# Patient Record
Sex: MALE | Race: WHITE | Employment: FULL TIME
[De-identification: names, ages, dates, MRNs, and addresses within clinical notes are randomized per-mention and may not be internally consistent; named-entity substitution may affect disease eponyms.]

---

## 2020-11-10 ENCOUNTER — CLINICAL DOCUMENTATION (OUTPATIENT)
Dept: CASE MANAGEMENT | Age: 65
End: 2020-11-10

## 2020-11-10 ENCOUNTER — OFFICE VISIT (OUTPATIENT)
Dept: ONCOLOGY | Age: 65
End: 2020-11-10
Payer: COMMERCIAL

## 2020-11-10 ENCOUNTER — HOSPITAL ENCOUNTER (OUTPATIENT)
Dept: INFUSION THERAPY | Age: 65
Discharge: HOME | End: 2020-11-10
Payer: COMMERCIAL

## 2020-11-10 VITALS
RESPIRATION RATE: 19 BRPM | BODY MASS INDEX: 22.1 KG/M2 | TEMPERATURE: 97.3 F | WEIGHT: 154.4 LBS | DIASTOLIC BLOOD PRESSURE: 72 MMHG | HEART RATE: 85 BPM | HEIGHT: 70 IN | OXYGEN SATURATION: 96 % | SYSTOLIC BLOOD PRESSURE: 135 MMHG

## 2020-11-10 PROBLEM — C78.7 PANCREATIC CANCER METASTASIZED TO LIVER (HCC): Status: ACTIVE | Noted: 2020-11-10

## 2020-11-10 PROBLEM — Z51.11 CHEMOTHERAPY MANAGEMENT, ENCOUNTER FOR: Status: ACTIVE | Noted: 2020-11-10

## 2020-11-10 PROBLEM — C25.9 PANCREATIC CANCER METASTASIZED TO LIVER (HCC): Status: ACTIVE | Noted: 2020-11-10

## 2020-11-10 PROCEDURE — 99205 OFFICE O/P NEW HI 60 MIN: CPT | Performed by: INTERNAL MEDICINE

## 2020-11-10 PROCEDURE — 99211 OFF/OP EST MAY X REQ PHY/QHP: CPT

## 2020-11-10 RX ORDER — MEPERIDINE HYDROCHLORIDE 50 MG/ML
12.5 INJECTION INTRAMUSCULAR; INTRAVENOUS; SUBCUTANEOUS ONCE
Status: CANCELLED | OUTPATIENT
Start: 2020-11-27

## 2020-11-10 RX ORDER — PACLITAXEL 100 MG/20ML
125 INJECTION, POWDER, LYOPHILIZED, FOR SUSPENSION INTRAVENOUS ONCE
Status: CANCELLED | OUTPATIENT
Start: 2020-11-20

## 2020-11-10 RX ORDER — PACLITAXEL 100 MG/20ML
125 INJECTION, POWDER, LYOPHILIZED, FOR SUSPENSION INTRAVENOUS ONCE
Status: CANCELLED | OUTPATIENT
Start: 2020-11-13

## 2020-11-10 RX ORDER — EPINEPHRINE 1 MG/ML
0.3 INJECTION, SOLUTION, CONCENTRATE INTRAVENOUS PRN
Status: CANCELLED | OUTPATIENT
Start: 2020-11-13

## 2020-11-10 RX ORDER — DIPHENHYDRAMINE HYDROCHLORIDE 50 MG/ML
50 INJECTION INTRAMUSCULAR; INTRAVENOUS ONCE
Status: CANCELLED | OUTPATIENT
Start: 2020-11-20

## 2020-11-10 RX ORDER — SODIUM CHLORIDE 0.9 % (FLUSH) 0.9 %
10 SYRINGE (ML) INJECTION PRN
Status: CANCELLED | OUTPATIENT
Start: 2020-11-27

## 2020-11-10 RX ORDER — DIPHENHYDRAMINE HYDROCHLORIDE 50 MG/ML
50 INJECTION INTRAMUSCULAR; INTRAVENOUS ONCE
Status: CANCELLED | OUTPATIENT
Start: 2020-11-27

## 2020-11-10 RX ORDER — MEPERIDINE HYDROCHLORIDE 50 MG/ML
12.5 INJECTION INTRAMUSCULAR; INTRAVENOUS; SUBCUTANEOUS ONCE
Status: CANCELLED | OUTPATIENT
Start: 2020-11-20

## 2020-11-10 RX ORDER — PACLITAXEL 100 MG/20ML
125 INJECTION, POWDER, LYOPHILIZED, FOR SUSPENSION INTRAVENOUS ONCE
Status: CANCELLED | OUTPATIENT
Start: 2020-11-27

## 2020-11-10 RX ORDER — DOCUSATE SODIUM 100 MG/1
100 CAPSULE, LIQUID FILLED ORAL 2 TIMES DAILY
COMMUNITY

## 2020-11-10 RX ORDER — SODIUM CHLORIDE 0.9 % (FLUSH) 0.9 %
10 SYRINGE (ML) INJECTION PRN
Status: CANCELLED | OUTPATIENT
Start: 2020-11-13

## 2020-11-10 RX ORDER — SODIUM CHLORIDE 9 MG/ML
20 INJECTION, SOLUTION INTRAVENOUS ONCE
Status: CANCELLED | OUTPATIENT
Start: 2020-11-13

## 2020-11-10 RX ORDER — MORPHINE SULFATE 15 MG/1
15 TABLET ORAL EVERY 4 HOURS PRN
Qty: 21 TABLET | Refills: 0 | Status: SHIPPED | OUTPATIENT
Start: 2020-11-10 | End: 2020-11-12 | Stop reason: SDUPTHER

## 2020-11-10 RX ORDER — METHYLPREDNISOLONE SODIUM SUCCINATE 125 MG/2ML
125 INJECTION, POWDER, LYOPHILIZED, FOR SOLUTION INTRAMUSCULAR; INTRAVENOUS ONCE
Status: CANCELLED | OUTPATIENT
Start: 2020-11-13

## 2020-11-10 RX ORDER — MEPERIDINE HYDROCHLORIDE 50 MG/ML
12.5 INJECTION INTRAMUSCULAR; INTRAVENOUS; SUBCUTANEOUS ONCE
Status: CANCELLED | OUTPATIENT
Start: 2020-11-13

## 2020-11-10 RX ORDER — SODIUM CHLORIDE 0.9 % (FLUSH) 0.9 %
10 SYRINGE (ML) INJECTION PRN
Status: CANCELLED | OUTPATIENT
Start: 2020-11-20

## 2020-11-10 RX ORDER — SODIUM CHLORIDE 9 MG/ML
INJECTION, SOLUTION INTRAVENOUS CONTINUOUS
Status: CANCELLED | OUTPATIENT
Start: 2020-11-13

## 2020-11-10 RX ORDER — SODIUM CHLORIDE 0.9 % (FLUSH) 0.9 %
5 SYRINGE (ML) INJECTION PRN
Status: CANCELLED | OUTPATIENT
Start: 2020-11-27

## 2020-11-10 RX ORDER — EPINEPHRINE 1 MG/ML
0.3 INJECTION, SOLUTION, CONCENTRATE INTRAVENOUS PRN
Status: CANCELLED | OUTPATIENT
Start: 2020-11-27

## 2020-11-10 RX ORDER — SODIUM CHLORIDE 0.9 % (FLUSH) 0.9 %
5 SYRINGE (ML) INJECTION PRN
Status: CANCELLED | OUTPATIENT
Start: 2020-11-20

## 2020-11-10 RX ORDER — SODIUM CHLORIDE 9 MG/ML
20 INJECTION, SOLUTION INTRAVENOUS ONCE
Status: CANCELLED | OUTPATIENT
Start: 2020-11-20

## 2020-11-10 RX ORDER — HEPARIN SODIUM (PORCINE) LOCK FLUSH IV SOLN 100 UNIT/ML 100 UNIT/ML
500 SOLUTION INTRAVENOUS PRN
Status: CANCELLED | OUTPATIENT
Start: 2020-11-27

## 2020-11-10 RX ORDER — GABAPENTIN 400 MG/1
400 CAPSULE ORAL 3 TIMES DAILY
COMMUNITY

## 2020-11-10 RX ORDER — SODIUM CHLORIDE 9 MG/ML
INJECTION, SOLUTION INTRAVENOUS CONTINUOUS
Status: CANCELLED | OUTPATIENT
Start: 2020-11-27

## 2020-11-10 RX ORDER — SODIUM CHLORIDE 9 MG/ML
20 INJECTION, SOLUTION INTRAVENOUS ONCE
Status: CANCELLED | OUTPATIENT
Start: 2020-11-27

## 2020-11-10 RX ORDER — DIPHENHYDRAMINE HYDROCHLORIDE 50 MG/ML
50 INJECTION INTRAMUSCULAR; INTRAVENOUS ONCE
Status: CANCELLED | OUTPATIENT
Start: 2020-11-13

## 2020-11-10 RX ORDER — IBUPROFEN 200 MG
600 TABLET ORAL 4 TIMES DAILY PRN
COMMUNITY
End: 2020-12-02 | Stop reason: ALTCHOICE

## 2020-11-10 RX ORDER — METHYLPREDNISOLONE SODIUM SUCCINATE 125 MG/2ML
125 INJECTION, POWDER, LYOPHILIZED, FOR SOLUTION INTRAMUSCULAR; INTRAVENOUS ONCE
Status: CANCELLED | OUTPATIENT
Start: 2020-11-27

## 2020-11-10 RX ORDER — HEPARIN SODIUM (PORCINE) LOCK FLUSH IV SOLN 100 UNIT/ML 100 UNIT/ML
500 SOLUTION INTRAVENOUS PRN
Status: CANCELLED | OUTPATIENT
Start: 2020-11-13

## 2020-11-10 RX ORDER — ONDANSETRON 4 MG/1
4 TABLET, FILM COATED ORAL DAILY
COMMUNITY
End: 2020-11-11 | Stop reason: SDUPTHER

## 2020-11-10 RX ORDER — EPINEPHRINE 1 MG/ML
0.3 INJECTION, SOLUTION, CONCENTRATE INTRAVENOUS PRN
Status: CANCELLED | OUTPATIENT
Start: 2020-11-20

## 2020-11-10 RX ORDER — SODIUM CHLORIDE 0.9 % (FLUSH) 0.9 %
5 SYRINGE (ML) INJECTION PRN
Status: CANCELLED | OUTPATIENT
Start: 2020-11-13

## 2020-11-10 RX ORDER — SODIUM CHLORIDE 9 MG/ML
INJECTION, SOLUTION INTRAVENOUS CONTINUOUS
Status: CANCELLED | OUTPATIENT
Start: 2020-11-20

## 2020-11-10 RX ORDER — MORPHINE SULFATE 15 MG/1
15 TABLET ORAL EVERY 8 HOURS
COMMUNITY
End: 2020-11-13

## 2020-11-10 RX ORDER — POLYETHYLENE GLYCOL 3350 17 G/17G
17 POWDER, FOR SOLUTION ORAL DAILY
COMMUNITY

## 2020-11-10 RX ORDER — TAMSULOSIN HYDROCHLORIDE 0.4 MG/1
0.4 CAPSULE ORAL DAILY
Qty: 90 CAPSULE | Refills: 1 | Status: SHIPPED | OUTPATIENT
Start: 2020-11-10

## 2020-11-10 RX ORDER — METHYLPREDNISOLONE SODIUM SUCCINATE 125 MG/2ML
125 INJECTION, POWDER, LYOPHILIZED, FOR SOLUTION INTRAMUSCULAR; INTRAVENOUS ONCE
Status: CANCELLED | OUTPATIENT
Start: 2020-11-20

## 2020-11-10 RX ORDER — HEPARIN SODIUM (PORCINE) LOCK FLUSH IV SOLN 100 UNIT/ML 100 UNIT/ML
500 SOLUTION INTRAVENOUS PRN
Status: CANCELLED | OUTPATIENT
Start: 2020-11-20

## 2020-11-10 RX ORDER — DEXAMETHASONE SODIUM PHOSPHATE 4 MG/ML
8 INJECTION, SOLUTION INTRA-ARTICULAR; INTRALESIONAL; INTRAMUSCULAR; INTRAVENOUS; SOFT TISSUE ONCE
Status: CANCELLED | OUTPATIENT
Start: 2020-11-13

## 2020-11-10 NOTE — PROGRESS NOTES
[facility] [address] [facility] [address], [address] [facility] [address]  Justin[address] Peterson9  Dept: [phone]  Dept Fax: [phone]: [phone]    Visit Date:11/10/2020     Eve Uribe is a 76 y.o. male who presents today for:   Chief Complaint   Patient presents with    New Patient     Pancreatic Cancer        HPI:   This is a 68-year-old patient with newly diagnosed metastatic pancreatic cancer. Due to worsening abdominal/pelvic pain the patient had CT of the abdomen on October 15, 2020. It showed mass involving the neck, proximal body of the pancreas contagious with the lesser curvature of the stomach. Findings highly suspicious for malignancy involving the pancreas. CT of the abdomen also showed moderate amount of ascites, scattered nodular density in the peritoneum as well as the peritoneal service and several lesions in the liver along the liver capsule. , scattered nodular density in the peritoneum as well as the peritoneal service. CT-guided liver biopsy on October 27, 2020 showed poorly differentiated carcinoma. TTF-1 and CDX negative. Given the patient's history of prostate cancer prostate marker NKX3.1 was performed and it was negative. His CA 19-9 was elevated to 1402 on October 21, 2020. Based on clinical presentation, imaging studies, biopsy reports findings most consistent with primary pancreatic cancer with metastasis to the liver and peritoneum. Patient met with local oncologist at [facility] Dr. Koreen Sandifer who recommended palliated for chemotherapy with FOLFIRI NOX. For logistical reasons the patient would prefer to receive treatment in [facility]. Patient was placed by prior medical oncologist on MS Contin 15 mg twice daily and oxycodone IR for breakthrough pain. For nausea he was placed on Zofran as needed. Patient carries history of prostate cancer. Diagnosed in 2015. He completed course of definitive radiation therapy to the prostate   April/May 2016.   He received total  docusate sodium (COLACE) 100 MG capsule Take 100 mg by mouth 2 times daily      tamsulosin (FLOMAX) 0.4 MG capsule Take 1 capsule by mouth daily 90 capsule 1    morphine (MSIR) 15 MG tablet Take 1 tablet by mouth every 4 hours as needed for Pain for up to 7 days. 21 tablet 0    sodium chloride 1 g tablet Take 2 tablets by mouth 3 times daily (with meals) 90 tablet 0    Morphine Sulfate ER 30 MG T12A Take 30 mg by mouth every 8 hours for 30 days. 60 tablet 0    ondansetron (ZOFRAN) 4 MG tablet Take 1 tablet by mouth daily for 7 days Take 4 mg by mouth daily 21 tablet 0    naloxegol (MOVANTIK) 12.5 MG TABS tablet Take 1 tablet by mouth every morning 10 tablet 0    lidocaine 4 % external patch Place 3 patches onto the skin daily 30 patch 0    omeprazole (PRILOSEC) 20 MG delayed release capsule Take 1 capsule by mouth daily 30 capsule 3     No current facility-administered medications for this visit. Allergies   Allergen Reactions    Anaprox [Naproxen] Itching    Vicodin [Hydrocodone-Acetaminophen] Nausea Only      Health Maintenance   Topic Date Due    Hepatitis C screen  1955    DTaP/Tdap/Td vaccine (1 - Tdap) 01/31/1973    Lipid screen  01/31/1994    Shingles Vaccine (1 of 2) 01/31/2004    Colon cancer screen colonoscopy  01/31/2004    Pneumococcal 65+ yrs at Risk Vaccine (1 of 2 - PCV13) 01/31/2019    Flu vaccine (1) 09/01/2020    PSA counseling  10/21/2021    Low dose CT lung screening  10/22/2021    AAA screen  Completed    Hepatitis A vaccine  Aged Out    Hepatitis B vaccine  Aged Out    Hib vaccine  Aged Out    Meningococcal (ACWY) vaccine  Aged Out        Subjective:   Review of Systems   Constitutional: Positive for activity change, appetite change, fatigue and unexpected weight change. Negative for fever. HENT: Negative for congestion, dental problem, facial swelling, hearing loss, mouth sores, nosebleeds, sore throat, tinnitus and trouble swallowing.     Eyes: Negative for discharge and visual disturbance. Respiratory: Negative for cough, chest tightness, shortness of breath and wheezing. Cardiovascular: Negative for chest pain, palpitations and leg swelling. Gastrointestinal: Positive for abdominal distention, constipation and nausea. Negative for abdominal pain, blood in stool, diarrhea, rectal pain and vomiting. Endocrine: Negative for cold intolerance, polydipsia and polyuria. Genitourinary: Negative for decreased urine volume, difficulty urinating, dysuria, flank pain, hematuria and urgency. Musculoskeletal: Positive for arthralgias. Negative for back pain, gait problem, joint swelling, myalgias and neck stiffness. Skin: Negative for color change, rash and wound. Neurological: Positive for weakness. Negative for dizziness, tremors, seizures, speech difficulty, light-headedness, numbness and headaches. Hematological: Negative for adenopathy. Does not bruise/bleed easily. Psychiatric/Behavioral: Negative for confusion and sleep disturbance. The patient is not nervous/anxious. Objective:   Physical Exam  Vitals signs reviewed. Constitutional:       General: He is not in acute distress. Appearance: He is well-developed. HENT:      Head: Normocephalic. Mouth/Throat:      Pharynx: No oropharyngeal exudate. Eyes:      General: No scleral icterus. Right eye: No discharge. Left eye: No discharge. Pupils: Pupils are equal, round, and reactive to light. Neck:      Musculoskeletal: Normal range of motion and neck supple. Thyroid: No thyromegaly. Vascular: No JVD. Trachea: No tracheal deviation. Cardiovascular:      Rate and Rhythm: Normal rate. Heart sounds: Normal heart sounds. No murmur. No friction rub. No gallop. Pulmonary:      Effort: Pulmonary effort is normal. No respiratory distress. Breath sounds: Normal breath sounds. No stridor. No wheezing or rales.    Chest:      Chest wall: No tenderness. Abdominal:      General: Bowel sounds are normal. There is no distension. Palpations: Abdomen is soft. There is no mass. Tenderness: There is no abdominal tenderness. There is no rebound. Musculoskeletal: Normal range of motion. Comments: Good range of motion in all four extremities. Lymphadenopathy:      Cervical: No cervical adenopathy. Skin:     General: Skin is warm. Findings: No erythema or rash. Neurological:      Mental Status: He is alert and oriented to person, place, and time. Cranial Nerves: No cranial nerve deficit. Motor: No abnormal muscle tone. Deep Tendon Reflexes: Reflexes are normal and symmetric. Psychiatric:         Behavior: Behavior normal.         Thought Content: Thought content normal.         Judgment: Judgment normal.         /72 (Site: Right Upper Arm, Position: Sitting, Cuff Size: Medium Adult)   Pulse 85   Temp 97.3 °F (36.3 °C) (Temporal)   Resp 19   Ht 5' 10\" (1.778 m)   Wt 154 lb 6.4 oz (70 kg)   SpO2 96%   BMI 22.15 kg/m²      ECOG status is 2    Imaging studies and labs:   No results found. Lab Results   Component Value Date    WBC 15.5 (H) 11/26/2020    HGB 9.8 (L) 11/26/2020    HCT 29.7 (L) 11/26/2020    MCV 96.4 (H) 11/26/2020     (H) 11/26/2020         Assessment/Plan  1. Metastatic pancreatic cancer. I had a lengthy discussion was the patient and his friend about prognosis of metastatic pancreatic cancer. Metastatic pancreatic cancer is not curable, the goals of patient management are to prolong survival and to maintain the quality of life for as long as possible, while minimizing the side effects from treatment. Conventional chemotherapy is an option for patients with an adequate performances status, controlled comorbidity, and a sufficient support system.  Systemic chemotherapy provides benefit to patients with advanced pancreatic cancer, improving disease-related symptoms and survival when compared with best supportive care alone. Patients with pancreatic cancer who have a good ECOG performance status of 0 or 1, a favorable comorbidity profile, and a serum total bilirubin level that is <1.5 times the upper limit of normal and who are otherwise able to tolerate an intensive approach, FOLFIRINOX, rather than gemcitabine-based doublet represents an acceptable option. Gemcitabine plus Abraxane is potentially less toxic alternatives to FOLFIRINOX for patients with an adequate comorbidity profile and a serum bilirubin level that is <1.5 times ULN. For patients with an ECOG performance status of 2, favorable or adequate comorbidity, and a total serum bilirubin level that is <1.5 times ULN, monotherapy with gemcitabine is preferred choice. For patients with an ECOG performance status ? 3 or poorly controlled comorbid conditions supportive care should be emphasized  Patient's current performance ECOG is 2/3. My recommendation is to proceed with Abraxane and Gemzar. Side effects associated with these agents reviewed with the patient. They include:    GEMCITABINE:   · Low blood counts. · Drug fevers. · Muscle achiness / fatigue    ABRAXANE:  · Neuropathy  · Low blood counts. · Diarrhea. · Muscle achiness / fatigue    Both of these medications has a small potential to cause nausea. 2.  Poorly controlled cancer related pain. Referral is to pain management clinic. Diagnosis Orders   1. Pancreatic cancer metastasized to liver [facility])  Linda Smith MD, Pain Medicine, [address] [place] [address]    DISCONTINUED: 0.9 % sodium chloride infusion    DISCONTINUED: Dexamethasone Sodium Phosphate injection 8 mg    DISCONTINUED: gemcitabine HCl (GEMZAR) 1,860 mg in sodium chloride 0.9 % 250 mL chemo IVPB    DISCONTINUED: morphine (MSIR) 15 MG tablet   2.  Chemotherapy management, encounter for  Linda Smith MD, Pain Medicine, [address] [place] [address]    DISCONTINUED: 0.9 % sodium chloride infusion DISCONTINUED: Dexamethasone Sodium Phosphate injection 8 mg    DISCONTINUED: gemcitabine HCl (GEMZAR) 1,860 mg in sodium chloride 0.9 % 250 mL chemo IVPB    DISCONTINUED: morphine (MSIR) 15 MG tablet        Orders Placed:   Orders Placed This Encounter   Procedures   Christopher Arndt MD, Pain Medicine, [facility]     Referral Priority:   Routine     Referral Type:   Eval and Treat     Referral Reason:   Specialty Services Required     Referred to Provider:   Olena Alvarez MD     Requested Specialty:   Pain Medicine     Number of Visits Requested:   1        Medications Prescribed:   Orders Placed This Encounter   Medications    DISCONTD: morphine (MSIR) 15 MG tablet     Sig: Take 1 tablet by mouth every 4 hours as needed for Pain for up to 7 days. Dispense:  21 tablet     Refill:  0     Reduce doses taken as pain becomes manageable    tamsulosin (FLOMAX) 0.4 MG capsule     Sig: Take 1 capsule by mouth daily     Dispense:  90 capsule     Refill:  1             I spent 60 minutes face-to-face with the patient and his friend. Greater than 50% of time was spent on counseling and coordinating his care. Face to face counseling included prognosis, risks, benefits of treatment, compliance and risk reduction.        Electronically signed by Dagoberto Bustamante MD on 11/10/20 at 3:25 PM EST

## 2020-11-10 NOTE — PROGRESS NOTES
Name: Anika Funes  : 1955  MRN: [id number]    Oncology Navigation- Initial Note:    Intake-  Contact Type: Medical Oncology    Diagnosis: GI- malignant    Home Disposition: Lives with other who is able to assist    Patient needs and barriers to care: Emotional Issues/ Fear/ Anxiety-symptom management     Referral Source: Outpatient    Receptive to Advanced Care Planning/ Palliative Care:  Deferred at this time    Interventions-      Education/Screenings:  Navigation Welcome Packet given and program explained       Referrals: Pain management Clinic, Dietician, Financial Navigator       Continuum of Care: Diagnosis/Active Treatment    Notes: Met with Lucio Rajput and his friend, Alanna Belle, during consultation with Dr. Grisel Amos for his Stage IV pancreatic cancer. Met him walking to office from parking lot, and retrieved wheel chair for him. He is self referral-saw Med/Onc in [employer] in [place], but has moved in, along with wife, Miladys Malik, to friend Nelson's house. In severe pain \"10\"-from mid back to anus. Always constant-and occasional sharp, shooting pains. Due for pain med at 3pm, (now 3) but didn't know if he should take anything before he sees [facility]. He had Morphine ER 15mg and he took with water. He takes every 8 hrs with IBP inbetween-Morphine does not last 8 hrs. Dr. Grisel Amos wanted to admit to hospital to get pain under control, but no beds and ED has 71 patients. Morphine ER increased to 30mg am and 8hrs later, at night continue with 15. Morphine short acting prescribed for breakthrough pain. Referral to Pain Management Clinic, appointment . Dr. Grisel Amos spoke in detail regarding chemotherapy-to start with Abraxane and Gemzar and discussed the goal of treatment for Stage IV. Lucio Rajput understands no cure, but goal of treatment is to keep disease under control with quality of life with more time.   Currently, during the day he spends most of his time resting-can only take very short, limited walks before pain

## 2020-11-10 NOTE — PATIENT INSTRUCTIONS
1. STAT referral to pain management clinic  2. Will return to clinic on Friday, November 13, 2020 to see me for labs: CBC, BMP, LFTs, CA 19-9 and to start first line of palliative chemotherapy with Gemzar and Abraxane  3. Chemo teaching before RTC  Patient Education        gemcitabine  Pronunciation:  lanie DAVE sanchez jigar  Brand:  Gemzar, Infugem  What is the most important information I should know about gemcitabine? Gemcitabine can increase your risk of bleeding or infection. Call your doctor if you have unusual bruising or bleeding, or new signs of infection (fever, chills, tiredness, bruising or bleeding, pale skin). Gemcitabine can also affect your liver, kidneys, or lungs. Tell your doctor if you have stomach pain, dark urine, yellow skin or eyes, little or no urinating, swelling, rapid weight gain, severe shortness of breath, wheezing, or cough with foamy mucus. If you receive gemcitabine during or after radiation treatment, tell your doctor right away if you have severe skin redness, swelling, oozing, or peeling. What is gemcitabine? Gemcitabine is used to treat cancers of the pancreas, lung, ovary, and breast.  Gemcitabine is sometimes given with other cancer medicines, or when other cancer treatments did not work or have stopped working. Gemcitabine may also be used for purposes not listed in this medication guide. What should I discuss with my healthcare provider before receiving gemcitabine? You should not use gemcitabine if you are allergic to it. Tell your doctor if you have ever had:  · kidney disease;  · liver disease (especially cirrhosis);  · alcoholism; or  · radiation treatment. Both men and women using this medicine should use effective birth control to prevent pregnancy. Gemcitabine can harm an unborn baby if the mother or father is using this medicine. · If you are a woman, do not use gemcitabine if you are pregnant.  You may need to have a negative pregnancy test before starting this treatment. Use effective birth control to prevent pregnancy while you are using this medicine and for at least 6 months after your last dose. · If you are a man, use effective birth control if your sex partner is able to get pregnant. Keep using birth control for at least 3 months after your last dose. · Tell your doctor right away if a pregnancy occurs while either the mother or the father is using gemcitabine. This medicine may affect fertility (ability to have children) in men. However, it is important to use birth control to prevent pregnancy because gemcitabine can harm an unborn baby. You should not breastfeed while you are using gemcitabine, and for at least 1 week after your last dose. How is gemcitabine used? Gemcitabine is given as an infusion into a vein. A healthcare provider will give you this injection. Tell your caregivers if you feel any burning, pain, or swelling around the IV needle when gemcitabine is injected. If any of this medicine accidentally gets on your skin, wash the area thoroughly with soap and warm water. Gemcitabine can increase your risk of bleeding or infection. You will need frequent medical tests. What happens if I miss a dose? Call your doctor for instructions if you miss an appointment for your gemcitabine injection. What happens if I overdose? Seek emergency medical attention or call the Poison Help line at 1-712.913.1001. What should I avoid while using gemcitabine? Avoid being near people who are sick or have infections. Avoid activities that may increase your risk of bleeding or injury. Do not receive a \"live\" vaccine while using gemcitabine, and avoid coming into contact with anyone who has recently received a live vaccine. There is a chance that the virus could be passed on to you. Live vaccines include measles, mumps, rubella (MMR), polio, rotavirus, typhoid, yellow fever, varicella (chickenpox), zoster (shingles), and nasal flu (influenza) vaccine.   What are the possible side effects of gemcitabine? Get emergency medical help if you have signs of an allergic reaction: hives; difficult breathing; swelling of your face, lips, tongue, or throat. Also call your doctor at once if you have:  · headache, confusion, change in mental status, vision loss, seizure (convulsions);  · blisters or ulcers in your mouth, trouble eating or swallowing;  · severe skin redness, swelling, oozing, or peeling during or after radiation treatment;  · liver problems --loss of appetite, stomach pain (upper right side), itching, dark urine, silverio-colored stools, jaundice (yellowing of the skin or eyes);  · low blood cell counts --fever, chills, tiredness, skin sores, cold hands and feet, feeling light-headed;  · fluid build-up in or around the lungs --pain when you breathe, feeling short of breath while lying down, wheezing, gasping for breath, cough with foamy mucus, cold, clammy skin, anxiety, rapid heartbeats; or  · signs of damaged red blood cells --unusual bruising or bleeding, pale skin, bloody diarrhea, red or pink urine, swelling, rapid weight gain, and little or no urination. Your cancer treatments may be delayed or permanently discontinued if you have certain side effects. Common side effects may include:  · fever;  · nausea, vomiting;  · low blood cell counts;  · abnormal blood or urine tests;  · shortness of breath;  · swelling in your hands or feet;  · rash; or  · red or pink urine. This is not a complete list of side effects and others may occur. Call your doctor for medical advice about side effects. You may report side effects to FDA at 5-917-FDA-4457. What other drugs will affect gemcitabine? Other drugs may affect gemcitabine, including prescription and over-the-counter medicines, vitamins, and herbal products. Tell your doctor about all your current medicines and any medicine you start or stop using. Where can I get more information?   Your doctor or pharmacist can provide more information about gemcitabine. Remember, keep this and all other medicines out of the reach of children, never share your medicines with others, and use this medication only for the indication prescribed. Every effort has been made to ensure that the information provided by [address] is accurate, up-to-date, and complete, but no guarantee is made to that effect. Drug information contained herein may be time sensitive. [facility] information has been compiled for use by healthcare practitioners and consumers in the [place] and therefore [facility] does not warrant that uses outside of the [place] are appropriate, unless specifically indicated otherwise. [facility]'s drug information does not endorse drugs, diagnose patients or recommend therapy. [facility]'s drug information is an informational resource designed to assist licensed healthcare practitioners in caring for their patients and/or to serve consumers viewing this service as a supplement to, and not a substitute for, the expertise, skill, knowledge and judgment of healthcare practitioners. The absence of a warning for a given drug or drug combination in no way should be construed to indicate that the drug or drug combination is safe, effective or appropriate for any given patient. [facility] does not assume any responsibility for any aspect of healthcare administered with the aid of information [facility] provides. The information contained herein is not intended to cover all possible uses, directions, precautions, warnings, drug interactions, allergic reactions, or adverse effects. If you have questions about the drugs you are taking, check with your doctor, nurse or pharmacist.  Copyright 0101-0279 [facility] [employer]. Version: 9.02. Revision date: 3/24/2020. Care instructions adapted under license by [facility] ([facility]).  If you have questions about a medical condition or this instruction, always ask your healthcare professional. [employer] disclaims any warranty or liability for your use of this information.

## 2020-11-11 ASSESSMENT — ENCOUNTER SYMPTOMS
RECTAL PAIN: 0
COUGH: 0
SHORTNESS OF BREATH: 0
BACK PAIN: 0
DIARRHEA: 0
BLOOD IN STOOL: 0
WHEEZING: 0
TROUBLE SWALLOWING: 0
ABDOMINAL PAIN: 0
FACIAL SWELLING: 0
CHEST TIGHTNESS: 0
EYE DISCHARGE: 0
VOMITING: 0
SORE THROAT: 0
COLOR CHANGE: 0

## 2020-11-11 NOTE — PROGRESS NOTES
New chemotherapy validation note:    Diagnosis for chemotherapy: Pancreatic ca    Regimen ordered: Gemzar/Abraxane    Reference or literature used for validation: NCCN     Date literature or guideline last updated 2021 ? Deviation from literature or guideline used: None    Summary of any verbal or telephone information obtained: n/s     Delebrt SÁNCHEZ Ph.  11/11/2020  9:50 AM

## 2020-11-11 NOTE — PROGRESS NOTES
Miguelangel Torres is a 76 y.o. male presenting today for:   Chief Complaint   Patient presents with    New Patient     Pancreatic Cancer     History of Present Illness: This is a 76year old male with a previous history of prostate cancer now presenting with severe abdominal pain with metastatic pancreatic cancer. He initially started to experience bilateral abdominal pain a few months ago, thinking it was kidney stones. The pain then further spread to the pelvis and the testicles. He presented to his PCP on 10/15/2020 with the lower abdominal and pelvic pain. Dr. Sha Brito ordered CT abdomen pelvis on 10/21/2020 showing a necrotic mass involving the neck, proximal body of the pancreas. This mass seems to involve the stomach as well making this lesion concerning for malignancy. Liver biopsy on 10/27/2020 showed poorly differentiated carcinoma, solidifying the diagnosis of metastatic pancreatic cancer. History of Prostate Cancer August 2015  In August 2015 he had an elevated PSA of 15.07. 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 He was treated with neoadjuvant Lupron in December 2015 and completed radiotherapy to the prostate on 04/21/2016. His symptoms of urine obstruction got better over the course of the treatment. 11/10/2020 he presents to the oncology clinic for evaluation and treatment of his pancreatic cancer. He reports excruciating pain that is very limiting to his everyday life. He is only able to walk for short periods of time and to ambulate to the bathroom. Other than that he is mostly sedentary. He reports a shooting pain that is relatively constant everywhere from \"his shoulders to his anus. \" He is currently taking morphine 15 mg every 8 hours and using ibuprofen 600 mg for break through pain. His pain is not well managed on this regimen. He rates this pain 10/10 severity. ECOG score 3 as of 11/10/2020.  He is capable of only limited selfcare; confined to bed or chair more than 50% of waking hours. He admits to decrease in appetite and poor food intake. Additionally the patient is nauseous most everyday. Past Medical History:   Past Medical History:   Diagnosis Date    Anxiety     Arthritis     [facility] [address]     [facility]     Hyperlipidemia     [facility]     [facility] ([facility]     Sleep apnea        Past Surgical History:    Past Surgical History:   Procedure Laterality Date    APPENDECTOMY      BACK SURGERY      KNEE CARTILAGE SURGERY Right 1977    KNEE SURGERY Left     1980    NASAL SEPTUM SURGERY Left         Allergies: Allergies   Allergen Reactions    Anaprox [Naproxen] Itching    Vicodin [Hydrocodone-Acetaminophen] Nausea Only       Medications:    Morphine 15 mg tablet every 8 hours   Gabapentin 400 mg capsule 3x daily  Zofran 4 mg tablet daily  Ibuprofen 600 mg 4x daily PRN  Glycolax 17 g daily  Colace 100 mg capsule 2x daily   Flomax 0.4 mg capsule daily    Social History:    Social History     Tobacco Use    Smoking status: Current Every Day Smoker     Packs/day: 1.00     Years: 50.00     Pack years: 50.00     Types: Cigarettes     Start date: 1970    Smokeless tobacco: Never Used   Substance Use Topics    Alcohol use: Not Currently    Drug use: Not Currently       Family History:    Family History   Problem Relation Age of Onset    Cancer Mother     Depression Mother     Heart Disease Mother      Review of Systems:   General:  Positive fatigue, weight loss, decreased appetite. Denies fever, chills. Head:  Positive light headedness. Denies headache, dizziness. Eyes:  Denies changes in vision. Ears:  Denies hearing loss, tinnitus. Nose: Denies rhinorrhea, congestion, epistaxis. Throat: Denies sore throat, trouble swallowing. Neck: Denies neck tenderness, lymphadenopathy, decreased ROM. Back:   Positive back pain. History of lumbar spinal stenosis. Respiratory:   Denies SOB, cough. Cardiovascular:  Denies chest pain, palpitations, racing heart. GI:   Positive constipation. Last bowel movement 11/09/2020, he did not have bowel movement for over 5-6 days. Experienced increased abdominal pain during this time. Denies diarrhea, blood in stool. :  Denies urinary incontinence, dysuria, hematuria. Endocrine: Positive night sweats, hot flashes. Extremities: Denies edema. Skin: Denies skin changes, rashes. Neuro: Positive for nerve pain. History of fibromyalgia. Lymph nodes: Denies lymphadenopathy. Physical Exam:      General Appearance:  Alert and cooperative. Patient is ill-appearing and cachetic. He is thin in appearance. Head:  Normocephalic, without obvious abnormality, atraumatic. Eyes:  PERRL, EOM's intact, both eyes. Neck: Supple, symmetrical, trachea midline, no adenopathy,    Back:   Symmetric, no curvature, ROM limited. Lungs:   Clear to auscultation bilaterally, respirations unlabored. Heart:  Regular rate and rhythm, S1, S2 normal, no murmur, rub or gallop. Abdomen:   Soft, exquisite tenderness, guarding and pain with palpation and auscultation, bowel sounds active all four quadrants, no masses, no organomegaly. Extremities: Extremities normal, atraumatic, no cyanosis or edema. Pulses: 2+ and symmetric. Skin: Skin color, texture, turgor normal, no rashes or lesions. Lymph nodes: Cervical, supraclavicular, and axillary nodes normal.   Neurologic: Normal.         Assessment and Plan:   1. Metastatic Pancreatic Cancer   Patient presented to the clinic for management and treatment of newly diagnosed pancreatic cancer. Patient's associated pain is not well controlled which can be limiting to potential treatment. He is currently ECOG 3. Being that this cancer is very aggressive Dr. Rey Caraballo is planning to go ahead with treatment aside from the patient's pain.  At this time Dr. Rey Caraballo was prepared to admit the patient directly to the hospital for pain management, unfortunately the ED was overcrowded, making him unable to be admitted. He was given a STAT referral to the pain management clinic with Dr. Marie sOhea. In conjunction to the referral, the patient prescribed increased dose of long acting morphine and short acting morphine for breakthrough pain. Patient was instructed to take morphine 30 mg in the morning and midday, and 15 mg at nighttime. He was also given breakthrough short acting morphine 15 mg. Patient was instructed to return to clinic on Friday, 11/31/2020 for labs: CBC, BMP, LFTs, CA 19-9 and to start first line palliative chemotherapy with Gemzar and Abraxane. Chemo teaching is to be done before Friday's appointment. Bruno Molina was seen today for new patient. Diagnoses and all orders for this visit:    Pancreatic cancer metastasized to liver [facility])  -     Hammad Brambila MD, Pain Medicine, [address] [facility]  -     morphine (MSIR) 15 MG tablet; Take 1 tablet by mouth every 4 hours as needed for Pain for up to 7 days. Chemotherapy management, encounter for  -     Hammad Brambila MD, Pain Medicine, [address] [facility]  -     morphine (MSIR) 15 MG tablet; Take 1 tablet by mouth every 4 hours as needed for Pain for up to 7 days. Other orders  -     tamsulosin (FLOMAX) 0.4 MG capsule;  Take 1 capsule by mouth daily

## 2020-11-12 ENCOUNTER — HOSPITAL ENCOUNTER (OUTPATIENT)
Dept: INFUSION THERAPY | Age: 65
Discharge: HOME | End: 2020-11-12
Payer: COMMERCIAL

## 2020-11-12 PROCEDURE — 99212 OFFICE O/P EST SF 10 MIN: CPT

## 2020-11-12 RX ORDER — ONDANSETRON 4 MG/1
4 TABLET, FILM COATED ORAL DAILY
Qty: 21 TABLET | Refills: 0 | Status: ON HOLD | OUTPATIENT
Start: 2020-11-12 | End: 2020-11-23

## 2020-11-12 RX ORDER — SODIUM CHLORIDE 0.9 % (FLUSH) 0.9 %
10 SYRINGE (ML) INJECTION PRN
Status: CANCELLED | OUTPATIENT
Start: 2020-11-12

## 2020-11-12 RX ORDER — HEPARIN SODIUM (PORCINE) LOCK FLUSH IV SOLN 100 UNIT/ML 100 UNIT/ML
500 SOLUTION INTRAVENOUS PRN
Status: CANCELLED | OUTPATIENT
Start: 2020-11-12

## 2020-11-12 RX ORDER — MORPHINE SULFATE 15 MG/1
15 TABLET ORAL EVERY 4 HOURS PRN
Qty: 21 TABLET | Refills: 0 | Status: ON HOLD | OUTPATIENT
Start: 2020-11-12 | End: 2020-11-23

## 2020-11-12 RX ORDER — SODIUM CHLORIDE 0.9 % (FLUSH) 0.9 %
20 SYRINGE (ML) INJECTION PRN
Status: CANCELLED | OUTPATIENT
Start: 2020-11-12

## 2020-11-12 NOTE — PROGRESS NOTES
Patient and family instructed on chemotherapy specifically the drugs Abraxane/Gemzar and  chemotherapy regimen. The [employer] folder along with the following - chemotherapy drug information sheets,chemotherapy side effects handouts,Understanding your blood counts, [place] diet,Importance to hydration,when to call physician sheet,reduce risk infection sheet,bleeding precaution,neutropenia precaution, All questions answered satisfactory and support given. Patient navigator explained to patient and informed that she may be calling him/her if needs assistance. Patient given a tour of facility. Approximately 60 minutes spent with patient and family. Patient wheelchair assisted off the unit, by brother, with no further questions regarding his chemotherapy teaching, with belongings.

## 2020-11-12 NOTE — PLAN OF CARE
Problem: Musculor/Skeletal Functional Status  Goal: Absence of falls  Outcome: Met This Shift  Note: Patient free from falls while in O.P. Oncology. Intervention: Fall precautions  Note: Patient assessed for fall risk on admission to [address] [facility] [facility]. Fall band placed on patient. Discussed the need to use the call light for assistance prior to getting up out of chair/bed. Problem: Intellectual/Education/Knowledge Deficit  Goal: Teaching initiated upon admission  Outcome: Met This Shift  Note: Patient verbalizes understanding to verbal information given on Abraxane /Gemzar ,action and possible side effects. Aware to call MD if develop complications. Intervention: Verbal/written education provided  Note: Patient and family instructed on chemotherapy specifically the drugs Abraxane/Gemzar and  chemotherapy regimen. The [employer] folder along with the following - chemotherapy drug information sheets,chemotherapy side effects handouts,Understanding your blood counts, [place] diet,Importance to hydration,when to call physician sheet,reduce risk infection sheet,bleeding precaution,neutropenia precaution, All questions answered satisfactory and support given. Patient navigator explained to patient and informed that she may be calling him/her if needs assistance. Patient given a tour of facility. Approximately 60 minutes spent with patient and family. Problem: Discharge Planning:  Goal: Discharged to appropriate level of care  Description: Discharged to appropriate level of care  Outcome: Met This Shift  Note: Verbalized understanding of discharge instructions, follow-up appointments, and when to call the physician. Intervention: Assess readiness for discharge  Description: Assess readiness for discharge  Note: Discuss understanding of discharge instructions,follow-up appointments, and when to call the physician. Care plan reviewed with patient and brother.   Patient and brother verbalize understanding of the plan of care and contribute to goal setting.

## 2020-11-13 ENCOUNTER — HOSPITAL ENCOUNTER (OUTPATIENT)
Dept: INFUSION THERAPY | Age: 65
Discharge: HOME | End: 2020-11-13
Payer: COMMERCIAL

## 2020-11-13 ENCOUNTER — OFFICE VISIT (OUTPATIENT)
Dept: ONCOLOGY | Age: 65
End: 2020-11-13
Payer: COMMERCIAL

## 2020-11-13 VITALS
HEART RATE: 102 BPM | OXYGEN SATURATION: 97 % | BODY MASS INDEX: 22.79 KG/M2 | SYSTOLIC BLOOD PRESSURE: 167 MMHG | RESPIRATION RATE: 19 BRPM | DIASTOLIC BLOOD PRESSURE: 70 MMHG | HEIGHT: 70 IN | TEMPERATURE: 97.1 F | WEIGHT: 159.2 LBS

## 2020-11-13 VITALS
RESPIRATION RATE: 18 BRPM | DIASTOLIC BLOOD PRESSURE: 78 MMHG | HEART RATE: 78 BPM | TEMPERATURE: 95 F | SYSTOLIC BLOOD PRESSURE: 148 MMHG | OXYGEN SATURATION: 96 %

## 2020-11-13 DIAGNOSIS — C78.7 PANCREATIC CANCER METASTASIZED TO LIVER (HCC): Primary | ICD-10-CM

## 2020-11-13 DIAGNOSIS — C25.9 PANCREATIC CANCER METASTASIZED TO LIVER (HCC): Primary | ICD-10-CM

## 2020-11-13 DIAGNOSIS — Z51.11 CHEMOTHERAPY MANAGEMENT, ENCOUNTER FOR: ICD-10-CM

## 2020-11-13 LAB
ABSOLUTE IMMATURE GRANULOCYTE: 0.04 THOU/MM3 (ref 0–0.07)
ALBUMIN SERPL-MCNC: 3.6 G/DL (ref 3.5–5.1)
ALP BLD-CCNC: 597 U/L (ref 38–126)
ALT SERPL-CCNC: 127 U/L (ref 11–66)
AST SERPL-CCNC: 60 U/L (ref 5–40)
BASINOPHIL, AUTOMATED: 1 % (ref 0–3)
BASOPHILS ABSOLUTE: 0.1 THOU/MM3 (ref 0–0.1)
BILIRUB SERPL-MCNC: 2 MG/DL (ref 0.3–1.2)
BILIRUBIN DIRECT: 1.5 MG/DL (ref 0–0.3)
BUN, WHOLE BLOOD: 15 MG/DL (ref 8–26)
CA 19-9: 3368 U/ML (ref 0–35)
CHLORIDE, WHOLE BLOOD: 92 MEQ/L (ref 98–109)
CREATININE, WHOLE BLOOD: 0.6 MG/DL (ref 0.5–1.2)
EOSINOPHILS ABSOLUTE: 1.7 THOU/MM3 (ref 0–0.4)
EOSINOPHILS RELATIVE PERCENT: 16 % (ref 0–4)
GFR, ESTIMATED: > 90 ML/MIN/1.73M2
GLUCOSE, WHOLE BLOOD: 144 MG/DL (ref 70–108)
HCT VFR BLD CALC: 37.3 % (ref 42–52)
HEMOGLOBIN: 12.5 GM/DL (ref 14–18)
IMMATURE GRANULOCYTES: 0 %
IONIZED CALCIUM, WHOLE BLOOD: 1.13 MMOL/L (ref 1.12–1.32)
LYMPHOCYTES # BLD: 9 % (ref 15–47)
LYMPHOCYTES ABSOLUTE: 0.9 THOU/MM3 (ref 1–4.8)
MCH RBC QN AUTO: 31.5 PG (ref 26–33)
MCHC RBC AUTO-ENTMCNC: 33.5 GM/DL (ref 32.2–35.5)
MCV RBC AUTO: 94 FL (ref 80–94)
MONOCYTES ABSOLUTE: 0.9 THOU/MM3 (ref 0.4–1.3)
MONOCYTES: 9 % (ref 0–12)
PDW BLD-RTO: 11.9 % (ref 11.5–14.5)
PLATELET # BLD: 376 THOU/MM3 (ref 130–400)
PMV BLD AUTO: 9.3 FL (ref 9.4–12.4)
POTASSIUM, WHOLE BLOOD: 4.3 MEQ/L (ref 3.5–4.9)
RBC # BLD: 3.97 MILL/MM3 (ref 4.7–6.1)
SEG NEUTROPHILS: 65 % (ref 43–75)
SEGMENTED NEUTROPHILS ABSOLUTE COUNT: 6.8 THOU/MM3 (ref 1.8–7.7)
SODIUM, WHOLE BLOOD: 131 MEQ/L (ref 138–146)
TOTAL CO2, WHOLE BLOOD: 30 MEQ/L (ref 23–33)
TOTAL PROTEIN: 6.2 G/DL (ref 6.1–8)
WBC # BLD: 10.4 THOU/MM3 (ref 4.8–10.8)

## 2020-11-13 PROCEDURE — 99212 OFFICE O/P EST SF 10 MIN: CPT

## 2020-11-13 PROCEDURE — 80047 BASIC METABLC PNL IONIZED CA: CPT

## 2020-11-13 PROCEDURE — 96375 TX/PRO/DX INJ NEW DRUG ADDON: CPT

## 2020-11-13 PROCEDURE — 2580000003 HC RX 258: Performed by: INTERNAL MEDICINE

## 2020-11-13 PROCEDURE — 99215 OFFICE O/P EST HI 40 MIN: CPT | Performed by: INTERNAL MEDICINE

## 2020-11-13 PROCEDURE — 80076 HEPATIC FUNCTION PANEL: CPT

## 2020-11-13 PROCEDURE — 86301 IMMUNOASSAY TUMOR CA 19-9: CPT

## 2020-11-13 PROCEDURE — 96376 TX/PRO/DX INJ SAME DRUG ADON: CPT

## 2020-11-13 PROCEDURE — 6360000002 HC RX W HCPCS: Performed by: INTERNAL MEDICINE

## 2020-11-13 PROCEDURE — 96417 CHEMO IV INFUS EACH ADDL SEQ: CPT

## 2020-11-13 PROCEDURE — 96413 CHEMO IV INFUSION 1 HR: CPT

## 2020-11-13 PROCEDURE — 36415 COLL VENOUS BLD VENIPUNCTURE: CPT

## 2020-11-13 PROCEDURE — 36591 DRAW BLOOD OFF VENOUS DEVICE: CPT

## 2020-11-13 PROCEDURE — 85025 COMPLETE CBC W/AUTO DIFF WBC: CPT

## 2020-11-13 RX ORDER — PACLITAXEL 100 MG/20ML
125 INJECTION, POWDER, LYOPHILIZED, FOR SUSPENSION INTRAVENOUS ONCE
Status: CANCELLED | OUTPATIENT
Start: 2020-11-20

## 2020-11-13 RX ORDER — MORPHINE SULFATE 4 MG/ML
2 INJECTION, SOLUTION INTRAMUSCULAR; INTRAVENOUS ONCE
Status: COMPLETED | OUTPATIENT
Start: 2020-11-13 | End: 2020-11-13

## 2020-11-13 RX ORDER — PACLITAXEL 100 MG/20ML
125 INJECTION, POWDER, LYOPHILIZED, FOR SUSPENSION INTRAVENOUS ONCE
Status: COMPLETED | OUTPATIENT
Start: 2020-11-13 | End: 2020-11-13

## 2020-11-13 RX ORDER — DEXAMETHASONE SODIUM PHOSPHATE 4 MG/ML
8 INJECTION, SOLUTION INTRA-ARTICULAR; INTRALESIONAL; INTRAMUSCULAR; INTRAVENOUS; SOFT TISSUE ONCE
Status: COMPLETED | OUTPATIENT
Start: 2020-11-13 | End: 2020-11-13

## 2020-11-13 RX ORDER — SODIUM CHLORIDE 9 MG/ML
20 INJECTION, SOLUTION INTRAVENOUS ONCE
Status: DISCONTINUED | OUTPATIENT
Start: 2020-11-13 | End: 2020-11-14 | Stop reason: HOSPADM

## 2020-11-13 RX ORDER — PACLITAXEL 100 MG/20ML
125 INJECTION, POWDER, LYOPHILIZED, FOR SUSPENSION INTRAVENOUS ONCE
Status: CANCELLED | OUTPATIENT
Start: 2020-11-13

## 2020-11-13 RX ORDER — DEXAMETHASONE 4 MG/1
8 TABLET ORAL
Qty: 6 TABLET | Refills: 3 | Status: SHIPPED | OUTPATIENT
Start: 2020-11-13 | End: 2020-11-16

## 2020-11-13 RX ORDER — MORPHINE SULFATE 4 MG/ML
1 INJECTION, SOLUTION INTRAMUSCULAR; INTRAVENOUS ONCE
Status: COMPLETED | OUTPATIENT
Start: 2020-11-13 | End: 2020-11-13

## 2020-11-13 RX ORDER — PACLITAXEL 100 MG/20ML
125 INJECTION, POWDER, LYOPHILIZED, FOR SUSPENSION INTRAVENOUS ONCE
Status: CANCELLED | OUTPATIENT
Start: 2020-11-27

## 2020-11-13 RX ORDER — MORPHINE SULFATE 30 MG/1
30 TABLET, FILM COATED, EXTENDED RELEASE ORAL 2 TIMES DAILY
Qty: 60 TABLET | Refills: 0 | Status: ON HOLD | OUTPATIENT
Start: 2020-11-13 | End: 2020-11-23 | Stop reason: HOSPADM

## 2020-11-13 RX ADMIN — MORPHINE SULFATE 1 MG: 4 INJECTION, SOLUTION INTRAMUSCULAR; INTRAVENOUS at 09:05

## 2020-11-13 RX ADMIN — GEMCITABINE 1860 MG: 38 INJECTION, SOLUTION INTRAVENOUS at 09:47

## 2020-11-13 RX ADMIN — MORPHINE SULFATE 2 MG: 4 INJECTION, SOLUTION INTRAMUSCULAR; INTRAVENOUS at 10:23

## 2020-11-13 RX ADMIN — DEXAMETHASONE SODIUM PHOSPHATE 8 MG: 4 INJECTION, SOLUTION INTRAMUSCULAR; INTRAVENOUS at 09:09

## 2020-11-13 RX ADMIN — PACLITAXEL 235 MG: 100 INJECTION, POWDER, LYOPHILIZED, FOR SUSPENSION INTRAVENOUS at 10:43

## 2020-11-13 RX ADMIN — SODIUM CHLORIDE 20 ML/HR: 9 INJECTION, SOLUTION INTRAVENOUS at 09:05

## 2020-11-13 ASSESSMENT — PAIN SCALES - GENERAL
PAINLEVEL_OUTOF10: 5
PAINLEVEL_OUTOF10: 8
PAINLEVEL_OUTOF10: 9

## 2020-11-13 ASSESSMENT — PAIN DESCRIPTION - LOCATION: LOCATION: BACK;ABDOMEN

## 2020-11-13 ASSESSMENT — PAIN DESCRIPTION - FREQUENCY: FREQUENCY: INTERMITTENT

## 2020-11-13 ASSESSMENT — PAIN DESCRIPTION - ONSET: ONSET: ON-GOING

## 2020-11-13 ASSESSMENT — PAIN DESCRIPTION - PAIN TYPE: TYPE: CHRONIC PAIN

## 2020-11-13 ASSESSMENT — PAIN DESCRIPTION - PROGRESSION: CLINICAL_PROGRESSION: GRADUALLY IMPROVING

## 2020-11-13 NOTE — PROGRESS NOTES
Patient assessed for the following post chemotherapy:    Dizziness   No  Lightheadedness  No      Acute nausea/vomiting No  Headache   No  Chest pain/pressure  No  Rash/itching   No  Shortness of breath  No    Patient kept for 20 minutes observation post infusion chemotherapy. Patient tolerated chemotherapy treatment with abraxane and gemzar  without any complications. Last vital signs:   BP (!) 148/78   Pulse 78   Temp 95 °F (35 °C) (Tympanic)   Resp 18   SpO2 96%       Patient instructed if experience any of the above symptoms following today's infusion,he is to notify MD immediately or go to the emergency department. Discharge instructions given to patient. Verbalizes understanding. Off unit via wheelchair accompanied by friend  with belongings.

## 2020-11-13 NOTE — PLAN OF CARE
Problem: Pain:  Goal: Pain level will decrease  Description: Pain level will decrease  11/13/2020 1639 by Rebeka Adkins RN  Outcome: Met This Shift  Note: Pain level did decrease while here   11/13/2020 1634 by Rebeka Adkins RN  Outcome: Met This Shift  Intervention: Opioid analgesia side-effects  Note: Patient and friend verbalized understanding of medication given today for pain  Intervention: Promote participation in pain management plan  Note: Patient has pain clinic referral in process     Problem: Musculor/Skeletal Functional Status  Goal: Absence of falls  Outcome: Met This Shift  Note: No falls this admission   Intervention: Fall precautions  Note: Patient aware of fall precautions for here and at home -call light in reach while here       Problem: Intellectual/Education/Knowledge Deficit  Goal: Teaching initiated upon admission  Outcome: Met This Shift  Note: Chemotherapy Teaching     What is Chemotherapy   Drug action [x]   Method of Administration [x]   Handouts given []     Side Effects  Nausea/vomiting [x]   Diarrhea [x]   Fatigue [x]   Signs / Symptoms of infection [x]   Neutropenia [x]   Thrombocytopenia [x]   Alopecia [x]   neuropathy [x]   [place] diet &  the importance of fluids [x]       Micellaneous  Importance of nutrition [x]   Importance of oral hygiene [x]   When to call the MD [x]   Monitoring labs [x]   Use of supportive services []     Explanation of Drug Regimen / Frequency  Abraxane and gemzar      Comments  Verbalized understanding to drug,action,side effects and when to call MD      Goal: Written Disposition Instruction form completed  Outcome: Met This Shift  Note: Discharge instructions given and reviewed with patient. All questions answered.  Patient verbalized understanding   Intervention: Verbal/written education provided  Note: Discharge instructions sheets      Problem: Discharge Planning  Goal: Knowledge of discharge instructions  Description: Knowledge of discharge instructions  Outcome: Met This Shift  Note: Patient and family friend able to teach back follow up appointments and when to call the doctor. Patient offers no questions at this time   Intervention: Interaction with patient/family and care team  Note: Patient and friends concerns addressed   Intervention: Discharge to appropriate level of care  Note: Discharge home   Care plan reviewed with patient and friend . Patient and friend verbalize understanding of the plan of care and contribute to goal setting.

## 2020-11-13 NOTE — PROGRESS NOTES
severity. ECOG score 3 as of 11/10/2020. He is capable of only limited selfcare; confined to bed or chair more than 50% of waking hours. He admits to decrease in appetite and poor food intake. Additionally the patient is nauseous most everyday. Interim  history on November 13, 2020:  Patient presents to the medical oncology clinic to start cycle #1 of palliative chemotherapy with Gemzar and Abraxane. The patient reports poorly controlled abdominal pain radiating to his back. The patient ran out of long-acting morphine 24 hours ago. Continues having poor appetite. Denies having diarrhea. No fevers. No signs or symptoms of infection. Past Medical History:   Past Medical History:   Diagnosis Date    Anxiety     Arthritis     [facility] [address] [facility]     [facility] ulcer ([facility] [facility]     Hyperlipidemia     Panc[facility]     Prostate cancer ([facility] [facility]     Sleep apnea        Past Surgical History:    Past Surgical History:   Procedure Laterality Date    APPENDECTOMY      BACK SURGERY      KNEE CARTILAGE SURGERY Right 1977    KNEE SURGERY Left     1980    NASAL SEPTUM SURGERY Left         Allergies:     Allergies   Allergen Reactions    Anaprox [Naproxen] Itching    Vicodin [Hydrocodone-Acetaminophen] Nausea Only       Medications:    Morphine 15 mg tablet every 8 hours   Gabapentin 400 mg capsule 3x daily  Zofran 4 mg tablet daily  Ibuprofen 600 mg 4x daily PRN  Glycolax 17 g daily  Colace 100 mg capsule 2x daily   Flomax 0.4 mg capsule daily    Social History:    Social History     Tobacco Use    Smoking status: Current Every Day Smoker     Packs/day: 1.00     Years: 50.00     Pack years: 50.00     Types: Cigarettes     Start date: 1970    Smokeless tobacco: Never Used   Substance Use Topics    Alcohol use: Not Currently    Drug use: Not Currently       Family History:    Family History   Problem Relation Age of Onset    Cancer Mother     Depression Mother     Heart Disease Mother      Review of Systems:   General:  Positive fatigue, weight loss, decreased appetite. Denies fever, chills. Head:  Positive light headedness. Denies headache, dizziness. Eyes:  Denies changes in vision. Ears:  Denies hearing loss, tinnitus. Nose: Denies rhinorrhea, congestion, epistaxis. Throat: Denies sore throat, trouble swallowing. Neck: Denies neck tenderness, lymphadenopathy, decreased ROM. Back:   Positive back pain. History of lumbar spinal stenosis. Respiratory:   Denies SOB, cough. Cardiovascular:  Denies chest pain, palpitations, racing heart. GI:   Positive constipation. Last bowel movement 11/09/2020, he did not have bowel movement for over 5-6 days. Experienced increased abdominal pain during this time. Denies diarrhea, blood in stool. :  Denies urinary incontinence, dysuria, hematuria. Endocrine: Positive night sweats, hot flashes. Extremities: Denies edema. Skin: Denies skin changes, rashes. Neuro: Positive for nerve pain. History of fibromyalgia. Lymph nodes: Denies lymphadenopathy. Physical Exam:      General Appearance:  Alert and cooperative. Patient is ill-appearing and cachetic. He is thin in appearance. Head:  Normocephalic, without obvious abnormality, atraumatic. Eyes:  PERRL, EOM's intact, both eyes. Neck: Supple, symmetrical, trachea midline, no adenopathy,    Back:   Symmetric, no curvature, ROM limited. Lungs:   Clear to auscultation bilaterally, respirations unlabored. Heart:  Regular rate and rhythm, S1, S2 normal, no murmur, rub or gallop. Abdomen:   Soft, exquisite tenderness, guarding and pain with palpation and auscultation, bowel sounds active all four quadrants, no masses, no organomegaly. Extremities: Extremities normal, atraumatic, no cyanosis or edema. Pulses: 2+ and symmetric. Skin: Skin color, texture, turgor normal, no rashes or lesions.    Lymph nodes: Cervical, supraclavicular, and axillary nodes normal.   Neurologic: Normal.         Assessment and Plan:   1. Metastatic Pancreatic Cancer   Patient presented to the clinic for management and treatment of newly diagnosed pancreatic cancer. I had a lengthy discussion was the patient and his wife about prognosis of metastatic pancreatic cancer. Metastatic pancreatic cancer is not curable, the goals of patient management are to prolong survival and to maintain the quality of life for as long as possible, while minimizing the side effects from treatment. Conventional chemotherapy is an option for patients with an adequate performances status, controlled comorbidity, and a sufficient support system. Systemic chemotherapy provides benefit to patients with advanced pancreatic cancer, improving disease-related symptoms and survival when compared with best supportive care alone. Patients with pancreatic cancer who have a good ECOG performance status of 0 or 1, a favorable comorbidity profile, and a serum total bilirubin level that is <1.5 times the upper limit of normal and who are otherwise able to tolerate an intensive approach, FOLFIRINOX, rather than gemcitabine-based doublet represents an acceptable option. Gemcitabine plus Abraxane is potentially less toxic alternatives to FOLFIRINOX for patients with an adequate comorbidity profile and a serum bilirubin level that is <1.5 times ULN. For patients with an ECOG performance status of 2, favorable or adequate comorbidity, and a total serum bilirubin level that is <1.5 times ULN, monotherapy with gemcitabine is preferred choice. For patients with an ECOG performance status ? 3 or poorly controlled comorbid conditions supportive care should be emphasized. My recommendation is to proceed with Abraxane and Gemzar  2. Palliative chemotherapy with Abraxane and Gemzar. Vital signs are stable, labs are within range allowing for treatment today. Side effects associated with these agents were reviewed with the patient again.   They include:    GEMCITABINE:   · Low blood counts. · Drug fevers. This can happen within 24 hours of receiving the drug and may last for 1 to 2 days. · Muscle achiness / fatigue    ABRAXANE:  · Neuropathy. · Low blood counts. · Diarrhea. Both of these medications has a small potential to cause nausea. We will give you an anti-nausea medication here prior to receiving these agents. You will also have something to take at home as needed for nausea. If you experience nausea that is not controlled by the medication you have, please call our office. 3.  Poorly controlled cancer related pain. The patient will receive 1 mg of IV morphine to help with acute pain. We wrote new prescription for MS Contin 30 mg every 8 hours. OARS prescription monitoring report for this patient was reviewed today. No signs of potential drug abuse or divergent fashion identified. Patient was instructed to take dexamethasone 8 mg daily for 3 days starting tomorrow     Sonia Princess was seen today for follow-up and treatment. Diagnoses and all orders for this visit:    Pancreatic cancer metastasized to liver [facility])  -     Discontinue: Morphine Sulfate ER 15 MG T12A; Take 15 mg by mouth every 12 hours for 30 days. (Patient not taking: Reported on 11/13/2020)  -     Morphine Sulfate ER 30 MG T12A; Take 30 mg by mouth every 12 hours for 30 days. -     morphine (MS CONTIN) 30 MG extended release tablet; Take 1 tablet by mouth 2 times daily for 30 days. Chemotherapy management, encounter for    Cancer related pain    Failure to thrive in adult    Other orders  -     dexamethasone (DECADRON) 4 MG tablet;  Take 2 tablets by mouth daily (with breakfast) for 3 doses

## 2020-11-13 NOTE — PROGRESS NOTES
Patient sleeping in chair quietely no moaning noted. Friend states this is the most comfortable I have seen him rest in a week.

## 2020-11-13 NOTE — PROGRESS NOTES
Chemotherapy Administration    Pre-assessment Data: Antineoplastic Agents  Other:   See toxicity flow sheet for assessment [x]     Physician Notification of Concerns Related to Chemotherapy Administration:   Physician Notified Rolando Ann / Time of Notification      Interventions:   Lab work assessed  [x]   Height / Weight verified for dose [x]   Current MAR reviewed [x]   Emergency drugs available as appropriate [x]   Anaphylaxis assessment completed [x]   Pre-medications administered as ordered [x]   Blood return noted upon initiation of chemotherapy [x]   Blood return noted each 1-2ml of a vesicant medication if given IV push []   Blood return noted each 2-3ml of a non-vesicant medication if given IV push []   Monitor for signs / symptoms of hypersensitivity reaction [x]   Chemotherapy orders (drug/dose/rate) verified by 2 Chemo certified RNs [x]   Monitor IV site and blood return throughout the infusion of the medication [x]   Document IV site checks on the IV assessment form [x]   Document chemotherapy teaching on the Patient Education tab [x]   Document patient verbalizes understanding of medications being administered [x]   If IV infiltration, see ONS Guidelines []   Other:      []

## 2020-11-16 ENCOUNTER — TELEPHONE (OUTPATIENT)
Dept: ONCOLOGY | Age: 65
End: 2020-11-16

## 2020-11-16 NOTE — TELEPHONE ENCOUNTER
Patient called stating he woke up this morning and his feet were a little swollen, instructed patient to elevate legs and see if swelling goes down. He says he has not had swelling before and doesn't know how concerned he should be about it. Also, states his GERD is acting up and would like something prescribed for it, states he cannot holding anything down without it coming up to burn his throat. Also, is asking if he can have a cream for his rectal area. States he has used in the past a Cordran 0.05% cream. Please advise. Thank you.

## 2020-11-18 ENCOUNTER — TELEPHONE (OUTPATIENT)
Dept: ONCOLOGY | Age: 65
End: 2020-11-18

## 2020-11-18 RX ORDER — FLURANDRENOLIDE 0.5 MG/G
1 CREAM TOPICAL 2 TIMES DAILY
Qty: 60 G | Refills: 1 | Status: SHIPPED | OUTPATIENT
Start: 2020-11-18 | End: 2020-11-25

## 2020-11-18 RX ORDER — OMEPRAZOLE 20 MG/1
20 CAPSULE, DELAYED RELEASE ORAL DAILY
Qty: 30 CAPSULE | Refills: 3 | Status: SHIPPED | OUTPATIENT
Start: 2020-11-18

## 2020-11-18 NOTE — TELEPHONE ENCOUNTER
Cordran cream required a prior authorization with insurance. It has been approved. Called [facility] to reprocess. Patient has $0 copay, but they have to order it for 11/19/20. Called patient and updated him. He voiced appreciation and understanding. Patient will call with any other issues.

## 2020-11-18 NOTE — TELEPHONE ENCOUNTER
Gregg Ferrer called and stated that at [place]`s appt on 11/13/20 that something for his GERD,which he is unable to keep water down his stomach hurts and talya rectal ointment. Please advise and thanks.

## 2020-11-19 ENCOUNTER — OFFICE VISIT (OUTPATIENT)
Dept: PHYSICAL MEDICINE AND REHAB | Age: 65
End: 2020-11-19
Payer: COMMERCIAL

## 2020-11-19 ENCOUNTER — HOSPITAL ENCOUNTER (INPATIENT)
Age: 65
LOS: 7 days | Discharge: HOME | DRG: 947 | End: 2020-11-26
Attending: FAMILY MEDICINE | Admitting: FAMILY MEDICINE
Payer: COMMERCIAL

## 2020-11-19 ENCOUNTER — APPOINTMENT (OUTPATIENT)
Dept: GENERAL RADIOLOGY | Age: 65
DRG: 947 | End: 2020-11-19
Attending: FAMILY MEDICINE
Payer: COMMERCIAL

## 2020-11-19 VITALS
DIASTOLIC BLOOD PRESSURE: 58 MMHG | HEIGHT: 70 IN | WEIGHT: 159 LBS | BODY MASS INDEX: 22.76 KG/M2 | SYSTOLIC BLOOD PRESSURE: 98 MMHG

## 2020-11-19 PROBLEM — C25.9 PANCREATIC CANCER (HCC): Status: ACTIVE | Noted: 2020-11-19

## 2020-11-19 LAB
ALBUMIN SERPL-MCNC: 2.8 G/DL (ref 3.5–5.1)
ALP BLD-CCNC: 482 U/L (ref 38–126)
ALT SERPL-CCNC: 201 U/L (ref 11–66)
ANION GAP SERPL CALCULATED.3IONS-SCNC: 11 MEQ/L (ref 8–16)
AST SERPL-CCNC: 65 U/L (ref 5–40)
BASOPHILS # BLD: 0.3 %
BASOPHILS ABSOLUTE: 0 THOU/MM3 (ref 0–0.1)
BILIRUB SERPL-MCNC: 5 MG/DL (ref 0.3–1.2)
BUN BLDV-MCNC: 28 MG/DL (ref 7–22)
CALCIUM SERPL-MCNC: 9 MG/DL (ref 8.5–10.5)
CHLORIDE BLD-SCNC: 86 MEQ/L (ref 98–111)
CO2: 28 MEQ/L (ref 23–33)
CREAT SERPL-MCNC: 0.6 MG/DL (ref 0.4–1.2)
EOSINOPHIL # BLD: 3.4 %
EOSINOPHILS ABSOLUTE: 0.4 THOU/MM3 (ref 0–0.4)
ERYTHROCYTE [DISTWIDTH] IN BLOOD BY AUTOMATED COUNT: 13 % (ref 11.5–14.5)
ERYTHROCYTE [DISTWIDTH] IN BLOOD BY AUTOMATED COUNT: 44.4 FL (ref 35–45)
GFR SERPL CREATININE-BSD FRML MDRD: > 90 ML/MIN/1.73M2
GLUCOSE BLD-MCNC: 123 MG/DL (ref 70–108)
HCT VFR BLD CALC: 31.5 % (ref 42–52)
HEMOGLOBIN: 11 GM/DL (ref 14–18)
IMMATURE GRANS (ABS): 0.16 THOU/MM3 (ref 0–0.07)
IMMATURE GRANULOCYTES: 1.4 %
LYMPHOCYTES # BLD: 4.8 %
LYMPHOCYTES ABSOLUTE: 0.6 THOU/MM3 (ref 1–4.8)
MAGNESIUM: 1.7 MG/DL (ref 1.6–2.4)
MCH RBC QN AUTO: 32.6 PG (ref 26–33)
MCHC RBC AUTO-ENTMCNC: 34.9 GM/DL (ref 32.2–35.5)
MCV RBC AUTO: 93.5 FL (ref 80–94)
MONOCYTES # BLD: 2.5 %
MONOCYTES ABSOLUTE: 0.3 THOU/MM3 (ref 0.4–1.3)
NUCLEATED RED BLOOD CELLS: 0 /100 WBC
OSMOLALITY: 272 MOSMOL/KG (ref 275–295)
PHOSPHORUS: 3.7 MG/DL (ref 2.4–4.7)
PLATELET # BLD: 85 THOU/MM3 (ref 130–400)
PMV BLD AUTO: 11.8 FL (ref 9.4–12.4)
POTASSIUM SERPL-SCNC: 4.6 MEQ/L (ref 3.5–5.2)
PRO-BNP: 362 PG/ML (ref 0–900)
RBC # BLD: 3.37 MILL/MM3 (ref 4.7–6.1)
SEG NEUTROPHILS: 87.6 %
SEGMENTED NEUTROPHILS ABSOLUTE COUNT: 10.2 THOU/MM3 (ref 1.8–7.7)
SODIUM BLD-SCNC: 125 MEQ/L (ref 135–145)
TOTAL PROTEIN: 5.1 G/DL (ref 6.1–8)
WBC # BLD: 11.6 THOU/MM3 (ref 4.8–10.8)

## 2020-11-19 PROCEDURE — 84100 ASSAY OF PHOSPHORUS: CPT

## 2020-11-19 PROCEDURE — 83930 ASSAY OF BLOOD OSMOLALITY: CPT

## 2020-11-19 PROCEDURE — 83735 ASSAY OF MAGNESIUM: CPT

## 2020-11-19 PROCEDURE — 83880 ASSAY OF NATRIURETIC PEPTIDE: CPT

## 2020-11-19 PROCEDURE — 99205 OFFICE O/P NEW HI 60 MIN: CPT | Performed by: ANESTHESIOLOGY

## 2020-11-19 PROCEDURE — 74018 RADEX ABDOMEN 1 VIEW: CPT

## 2020-11-19 PROCEDURE — 1200000000 HC SEMI PRIVATE

## 2020-11-19 PROCEDURE — 80053 COMPREHEN METABOLIC PANEL: CPT

## 2020-11-19 PROCEDURE — 6370000000 HC RX 637 (ALT 250 FOR IP): Performed by: NURSE PRACTITIONER

## 2020-11-19 PROCEDURE — 2580000003 HC RX 258: Performed by: STUDENT IN AN ORGANIZED HEALTH CARE EDUCATION/TRAINING PROGRAM

## 2020-11-19 PROCEDURE — 6370000000 HC RX 637 (ALT 250 FOR IP): Performed by: STUDENT IN AN ORGANIZED HEALTH CARE EDUCATION/TRAINING PROGRAM

## 2020-11-19 PROCEDURE — 6360000002 HC RX W HCPCS: Performed by: NURSE PRACTITIONER

## 2020-11-19 PROCEDURE — 36415 COLL VENOUS BLD VENIPUNCTURE: CPT

## 2020-11-19 PROCEDURE — 99253 IP/OBS CNSLTJ NEW/EST LOW 45: CPT | Performed by: NURSE PRACTITIONER

## 2020-11-19 PROCEDURE — 99223 1ST HOSP IP/OBS HIGH 75: CPT | Performed by: FAMILY MEDICINE

## 2020-11-19 PROCEDURE — 85025 COMPLETE CBC W/AUTO DIFF WBC: CPT

## 2020-11-19 RX ORDER — GABAPENTIN 400 MG/1
400 CAPSULE ORAL 3 TIMES DAILY
Status: DISCONTINUED | OUTPATIENT
Start: 2020-11-19 | End: 2020-11-26 | Stop reason: HOSPADM

## 2020-11-19 RX ORDER — ACETAMINOPHEN 650 MG/1
650 SUPPOSITORY RECTAL EVERY 6 HOURS PRN
Status: DISCONTINUED | OUTPATIENT
Start: 2020-11-19 | End: 2020-11-19

## 2020-11-19 RX ORDER — SODIUM CHLORIDE 0.9 % (FLUSH) 0.9 %
10 SYRINGE (ML) INJECTION PRN
Status: DISCONTINUED | OUTPATIENT
Start: 2020-11-19 | End: 2020-11-26 | Stop reason: HOSPADM

## 2020-11-19 RX ORDER — PROMETHAZINE HYDROCHLORIDE 25 MG/1
12.5 TABLET ORAL EVERY 6 HOURS PRN
Status: DISCONTINUED | OUTPATIENT
Start: 2020-11-19 | End: 2020-11-26 | Stop reason: HOSPADM

## 2020-11-19 RX ORDER — SODIUM CHLORIDE 0.9 % (FLUSH) 0.9 %
10 SYRINGE (ML) INJECTION EVERY 12 HOURS SCHEDULED
Status: DISCONTINUED | OUTPATIENT
Start: 2020-11-19 | End: 2020-11-26 | Stop reason: HOSPADM

## 2020-11-19 RX ORDER — MORPHINE SULFATE 30 MG/1
30 TABLET, FILM COATED, EXTENDED RELEASE ORAL 2 TIMES DAILY
Status: DISCONTINUED | OUTPATIENT
Start: 2020-11-19 | End: 2020-11-19

## 2020-11-19 RX ORDER — ACETAMINOPHEN 325 MG/1
650 TABLET ORAL EVERY 4 HOURS PRN
Status: DISCONTINUED | OUTPATIENT
Start: 2020-11-19 | End: 2020-11-26 | Stop reason: HOSPADM

## 2020-11-19 RX ORDER — ACETAMINOPHEN 325 MG/1
650 TABLET ORAL EVERY 6 HOURS PRN
Status: DISCONTINUED | OUTPATIENT
Start: 2020-11-19 | End: 2020-11-19

## 2020-11-19 RX ORDER — MORPHINE SULFATE 30 MG/1
30 TABLET, FILM COATED, EXTENDED RELEASE ORAL EVERY 8 HOURS
Status: DISCONTINUED | OUTPATIENT
Start: 2020-11-19 | End: 2020-11-26 | Stop reason: HOSPADM

## 2020-11-19 RX ORDER — DOCUSATE SODIUM 100 MG/1
100 CAPSULE, LIQUID FILLED ORAL 2 TIMES DAILY
Status: DISCONTINUED | OUTPATIENT
Start: 2020-11-19 | End: 2020-11-26 | Stop reason: HOSPADM

## 2020-11-19 RX ORDER — MORPHINE SULFATE 15 MG/1
15 TABLET ORAL EVERY 4 HOURS PRN
Status: DISCONTINUED | OUTPATIENT
Start: 2020-11-19 | End: 2020-11-19

## 2020-11-19 RX ORDER — MORPHINE SULFATE 10 MG/5ML
20 SOLUTION ORAL EVERY 4 HOURS PRN
Status: DISCONTINUED | OUTPATIENT
Start: 2020-11-19 | End: 2020-11-24

## 2020-11-19 RX ORDER — TAMSULOSIN HYDROCHLORIDE 0.4 MG/1
0.4 CAPSULE ORAL DAILY
Status: DISCONTINUED | OUTPATIENT
Start: 2020-11-19 | End: 2020-11-24

## 2020-11-19 RX ORDER — POLYETHYLENE GLYCOL 3350 17 G/17G
17 POWDER, FOR SOLUTION ORAL DAILY PRN
Status: DISCONTINUED | OUTPATIENT
Start: 2020-11-19 | End: 2020-11-24

## 2020-11-19 RX ORDER — ONDANSETRON 2 MG/ML
4 INJECTION INTRAMUSCULAR; INTRAVENOUS EVERY 6 HOURS PRN
Status: DISCONTINUED | OUTPATIENT
Start: 2020-11-19 | End: 2020-11-26 | Stop reason: HOSPADM

## 2020-11-19 RX ORDER — LIDOCAINE 4 G/G
3 PATCH TOPICAL DAILY
Status: DISCONTINUED | OUTPATIENT
Start: 2020-11-19 | End: 2020-11-26 | Stop reason: HOSPADM

## 2020-11-19 RX ADMIN — MORPHINE SULFATE 20 MG: 10 SOLUTION ORAL at 18:18

## 2020-11-19 RX ADMIN — TAMSULOSIN HYDROCHLORIDE 0.4 MG: 0.4 CAPSULE ORAL at 18:05

## 2020-11-19 RX ADMIN — MORPHINE SULFATE 30 MG: 30 TABLET, FILM COATED, EXTENDED RELEASE ORAL at 16:58

## 2020-11-19 RX ADMIN — HYDROMORPHONE HYDROCHLORIDE 1 MG: 1 INJECTION, SOLUTION INTRAMUSCULAR; INTRAVENOUS; SUBCUTANEOUS at 16:14

## 2020-11-19 RX ADMIN — GABAPENTIN 400 MG: 400 CAPSULE ORAL at 18:05

## 2020-11-19 RX ADMIN — NALOXEGOL OXALATE 12.5 MG: 12.5 TABLET, FILM COATED ORAL at 18:05

## 2020-11-19 RX ADMIN — GABAPENTIN 400 MG: 400 CAPSULE ORAL at 20:15

## 2020-11-19 RX ADMIN — DOCUSATE SODIUM 100 MG: 100 CAPSULE, LIQUID FILLED ORAL at 20:15

## 2020-11-19 RX ADMIN — HYDROMORPHONE HYDROCHLORIDE 1 MG: 1 INJECTION, SOLUTION INTRAMUSCULAR; INTRAVENOUS; SUBCUTANEOUS at 20:15

## 2020-11-19 RX ADMIN — SODIUM CHLORIDE, PRESERVATIVE FREE 10 ML: 5 INJECTION INTRAVENOUS at 20:16

## 2020-11-19 ASSESSMENT — PAIN - FUNCTIONAL ASSESSMENT: PAIN_FUNCTIONAL_ASSESSMENT: INTOLERABLE, UNABLE TO DO ANY ACTIVE OR PASSIVE ACTIVITIES

## 2020-11-19 ASSESSMENT — PAIN DESCRIPTION - PROGRESSION: CLINICAL_PROGRESSION: NOT CHANGED

## 2020-11-19 ASSESSMENT — PAIN DESCRIPTION - FREQUENCY: FREQUENCY: CONTINUOUS

## 2020-11-19 ASSESSMENT — PAIN DESCRIPTION - DESCRIPTORS: DESCRIPTORS: SHARP;SHOOTING

## 2020-11-19 ASSESSMENT — PAIN SCALES - GENERAL
PAINLEVEL_OUTOF10: 7
PAINLEVEL_OUTOF10: 9
PAINLEVEL_OUTOF10: 9
PAINLEVEL_OUTOF10: 8
PAINLEVEL_OUTOF10: 9
PAINLEVEL_OUTOF10: 8

## 2020-11-19 ASSESSMENT — PAIN DESCRIPTION - PAIN TYPE: TYPE: INTRACTABLE PAIN

## 2020-11-19 ASSESSMENT — PAIN DESCRIPTION - ORIENTATION: ORIENTATION: RIGHT;LEFT;INNER;MID

## 2020-11-19 ASSESSMENT — PAIN DESCRIPTION - ONSET: ONSET: PROGRESSIVE

## 2020-11-19 NOTE — H&P
HISTORY & PHYSICAL       Patient:  Prince Espinoza  YOB: 1955    MRN: [id number]     Acct: [de-identified]    PCP: Clara Garcia MD    Date of Admission: 11/19/2020    Date of Service: Pt seen/examined on 11/19/2020 and Admitted to Inpatient with expected LOS greater than two midnights due to medical therapy. ASSESSMENT and PLAN:    Active Hospital Problems    Diagnosis Date Noted    Pancreatic cancer ([facility] [address] [C25.9] 11/19/2020       Severe uncontrollable pain associated with metastatic pancreatic cancer  -This is a reason for direct admission today  -Currently on extended release morphine 30 mg twice daily as well as intermediate release morphine for breakthrough pain, these have been continued on admission  -Continue home gabapentin  -Start Dilaudid 1 mg every 4 hours as needed  -His abdominal pain is not controlled with the above regimen.   He is being admitted for adequate pain control  -Pain management consult, have seen the patient today, will await recommendations  -KUB   -CBC, CMP    Pancreatic cancer with mets to liver  -Follows with Dr. Zully Tijerina as outpatient, consulted  -Palliative care consulted for CODE STATUS and goals of care discussion  -Spiritual care consult as well to discuss POA and living will    Opiate-induced constipation  -Is on significant amounts of morphine at home as above  -Will obtain KUB  -Continue Colace  -Start Movantik  -Maalox as needed    Hypotension  -Patient has significantly dry mucous membranes on exam today, reports poor oral intake secondary to abdominal pain  -No echo on file  -Check BNP, if not elevated will start IV fluids    Poor oral intake, malnutrition  -Secondary to metastatic cancer and chronic pain  -Dietitian consult    Physical deconditioning  -PT/OT    History of prostate cancer  -Per history    DEE  -Continue CPAP if he is currently using    Hyperlipidemia  -Does not appear to currently be on a statin however considering his cancer prognosis, no indications to start a statin    Osteoarthritis  -Pain management as needed    Anxiety  -Noted per chart review/history  -Does not appear to be any home meds to manage anxiety however will continue to assess needs and may start PRN medication for anxiety if indicated    Tobacco abuse  -Per chart review, 1 pack/day smoker x 50 years  -Nicotine patches    Chief Complaint: Severe abdominal pain    History Of Present Illness: This is a 79-year-old male with a history of prostate cancer, history of pancreatic cancer with mets to liver, who has been directly admitted to [facility] from pain clinic. Patient was being seen by Dr. Herb Ramos earlier today in outpatient clinic and was complaining of severe 10 out of 10 abdominal pain not controlled with daily extended release and immediate release morphine. He was also recently seen by Dr. Julianna Resendez who, per documentation, had plan to send patient to the hospital however ED was significantly overwhelmed at that time. Oncology team increased his morphine dosage at that appointment and referred the patient to Dr. Herb Ramos. Report obtained today from Dr. Herb Ramos: Patient apparently came into the clinic and was doubled over in pain, frequently yelling out due to pain. Decision made to direct admit him for adequate pain control. Vital signs on admission are significant for blood pressure of 107/53. This is a slight improvement from blood pressure earlier in the day of 93/55. CODE STATUS: Full code for now, pending palliative care discussion.     Past Medical History:          Diagnosis Date    Anxiety     Arthritis     Cancer ([address]     Chronic skin ulcer ([address]     Hyperlipidemia     Pancreatic cancer ([address]     Prostate cancer ([address]     Sleep apnea        Past Surgical History:          Procedure Laterality Date    APPENDECTOMY      BACK SURGERY      KNEE CARTILAGE SURGERY Right 1977    KNEE SURGERY Left     1980    NASAL SEPTUM SURGERY Left        Medications Prior to Admission:      Prior to Admission medications    Medication Sig Start Date End Date Taking? Authorizing Provider   flurandrenolide (CORDRAN) 0.05 % CREA Apply 1 Tube topically 2 times daily for 7 days 11/18/20 11/25/20  Jessenia Pro MD   omeprazole (PRILOSEC) 20 MG delayed release capsule Take 1 capsule by mouth daily 11/18/20   Jessenia Pro MD   Morphine Sulfate ER 30 MG T12A Take 30 mg by mouth every 12 hours for 30 days. 11/13/20 12/13/20  Jessenia Pro MD   morphine (MS CONTIN) 30 MG extended release tablet Take 1 tablet by mouth 2 times daily for 30 days. 11/13/20 12/13/20  Jessenia Pro MD   ondansetron (ZOFRAN) 4 MG tablet Take 1 tablet by mouth daily for 7 days Take 4 mg by mouth daily 11/12/20 11/19/20  Jessenia Pro MD   morphine (MSIR) 15 MG tablet Take 1 tablet by mouth every 4 hours as needed for Pain for up to 7 days. 11/12/20 11/19/20  Jessenia Pro MD   gabapentin (NEURONTIN) 400 MG capsule Take 400 mg by mouth 3 times daily. Historical Provider, MD   ibuprofen (ADVIL;MOTRIN) 200 MG tablet Take 600 mg by mouth 4 times daily as needed for Pain    Historical Provider, MD   polyethylene glycol (GLYCOLAX) 17 GM/SCOOP powder Take 17 g by mouth daily    Historical Provider, MD   docusate sodium (COLACE) 100 MG capsule Take 100 mg by mouth 2 times daily    Historical Provider, MD   tamsulosin (FLOMAX) 0.4 MG capsule Take 1 capsule by mouth daily 11/10/20   Jessenia Pro MD       Allergies:  Anaprox [naproxen] and Vicodin [hydrocodone-acetaminophen]    Social History:      The patient currently lives at home. TOBACCO:   reports that he has been smoking cigarettes. He started smoking about 50 years ago. He has a 50.00 pack-year smoking history. He has never used smokeless tobacco.  ETOH:   reports previous alcohol use.       Family History:      Positive as follows:        Problem Relation Age of Onset    Cancer Mother     Depression Mother     Heart Disease Mother        Diet:  DIET GENERAL;    REVIEW OF SYSTEMS:   Pertinent positives as noted in the HPI. All other systems reviewed and negative. Review of Systems - Negative except as noted in HPI. PHYSICAL EXAM:    BP (!) 107/53   Pulse 115   Temp 98.8 °F (37.1 °C) (Oral)   Resp 18   Ht 5' 10\" (1.778 m)   Wt 161 lb 2.5 oz (73.1 kg)   SpO2 92%   BMI 23.12 kg/m²     General appearance:  No apparent distress, appears stated age and cooperative. Frail and chronically ill-appearing. HEENT:  Normal cephalic, atraumatic without obvious deformity. Pupils equal, round, and reactive to light. Extra ocular muscles intact. Conjunctivae/corneas clear. Dry mucous membranes. Neck: Supple, with full range of motion. No jugular venous distention. Trachea midline. Respiratory:  Normal respiratory effort. Diffuse rhonchi on auscultation. Cardiovascular:  Regular rate and rhythm with normal S1/S2 without murmurs, rubs or gallops. Abdomen: Significant tenderness to even light palpation. Musculoskeletal: 1+ pitting edema in bilateral lower extremities. Skin: Diffusely dry skin. Skin color and texture normal.  No rashes or lesions. Neurologic:  Neurovascularly intact without any focal sensory/motor deficits. Cranial nerves: II-XII intact, grossly non-focal.  Psychiatric:  Alert and oriented, thought content appropriate, normal insight  Capillary Refill: Brisk,< 3 seconds   Peripheral Pulses: +2 palpable, equal bilaterally       Labs:     Recent Labs     11/19/20  1609   WBC 11.6*   HGB 11.0*   HCT 31.5*   PLT 85*     No results for input(s): NA, K, CL, CO2, BUN, CREATININE, CALCIUM, PHOS in the last 72 hours. Invalid input(s): MAGNES  No results for input(s): AST, ALT, BILIDIR, BILITOT, ALKPHOS in the last 72 hours. No results for input(s): INR in the last 72 hours. No results for input(s): Raquel Hilda in the last 72 hours.     Urinalysis:    No results found for: NITRU, WBCUA, BACTERIA, RBCUA, BLOODU, SPECGRAV, GLUCOSEU    Intake & Output:  No intake/output data recorded. No intake/output data recorded. DVT prophylaxis: [x] Lovenox                                 [] SCDs                                 [] SQ Heparin                                 [] Encourage ambulation           [] Already on Anticoagulation    Code Status: Full Code    PT/OT Eval Status: Consulted    Disposition:    [x] Home       [] TCU       [] Rehab       [] Psych       [] SNF       [] [place]       [] Other-    Discharge planning: Pending improvement in symptoms. Would likely return to home on discharge. Palliative care consulted. Thank you Raquel Mendoza MD for the opportunity to be involved in this patient's care.     Electronically signed by Katheryn Downey MD on 11/19/2020 at 4:42 PM

## 2020-11-19 NOTE — PROGRESS NOTES
Chronic Pain Clinic Note     Encounter Date: 11/19/20    Subjective:   Chief Complaint:   Chief Complaint   Patient presents with    New Patient     pancreatic cancer     Abdominal Pain       History of Present Illness:   Stephane Beauchamp is a 76 y.o. male seen in the Pain Clinic initially on 11/19/20 upon request from Ester Bustillo MD (Oncology) for his history of adbominal pain. His medical history of prostate cancer (August 2015 s/p chemoradiation) and recent diagnosis of pancreatic cancer. He initially experienced abdominal pain several months ago and upon evaluation with a CT abdomen pelvis on 10/21/2020 revealed a necrotic mass involving the neck, proximal body of the pancreas. There is also some mass involving the stomach concerning for malignancy. A biopsy done on 10/27/20 revealed metastatic pancreatic cancer. He was evaluated by the oncology team on 11/10/2020 in which he underwent his first treatment of chemotherapy. In addition, there were plans to admit the patient directly for pain management however due to the ED being overcrowded he was unable to be admitted. In addition, they increased his long-acting morphine to 30/30/15 mg in 8-hour intervals with 15 mg morphine immediate release for breakthrough pain. He states he has been taking about 4 of the 50 mg immediate release tablets per day. He states that despite this increase his pain is still excruciating 10/10. He is unable to even sit upright due to the pain being so severe. The worst of his pain is on the right side of the abdomen but will radiate through both of the lower parts of his abdomen and up the entire abdomen. He also endorses severe pain radiating around to his back. He states that he has very poor appetite with associated nausea/vomiting. In addition, he notes that he has been able to pass bowel movements for a few days and has been only having very loose stools.     Current Treatment Medications:   Morphine ER 30 mg/30 file     Emotionally abused: Not on file     Physically abused: Not on file     Forced sexual activity: Not on file   Other Topics Concern    Not on file   Social History Narrative    Not on file       Medications & Allergies:   Current Outpatient Medications   Medication Instructions    docusate sodium (COLACE) 100 mg, Oral, 2 TIMES DAILY    flurandrenolide (CORDRAN) 0.05 % CREA 1 Tube, Topical, 2 TIMES DAILY    gabapentin (NEURONTIN) 400 mg, Oral, 3 TIMES DAILY    ibuprofen (ADVIL;MOTRIN) 600 mg, Oral, 4 TIMES DAILY PRN    morphine (MS CONTIN) 30 mg, Oral, 2 TIMES DAILY    morphine (MSIR) 15 mg, Oral, EVERY 4 HOURS PRN    Morphine Sulfate ER 30 mg, Oral, EVERY 12 HOURS    omeprazole (PRILOSEC) 20 mg, Oral, DAILY    ondansetron (ZOFRAN) 4 mg, Oral, DAILY, Take 4 mg by mouth daily    polyethylene glycol (GLYCOLAX) 17 g, Oral, DAILY    tamsulosin (FLOMAX) 0.4 mg, Oral, DAILY       Allergies   Allergen Reactions    Anaprox [Naproxen] Itching    Vicodin [Hydrocodone-Acetaminophen] Nausea Only       Review of Systems:   Constitutional: positive for poor appetite and weight loss  Genitourinary: negative for bowel/bladder incontinence  GI: loose stools/diarrhea   Musculoskeletal: positive for severe abdominal and back pain  Neurological: negative for any leg weakness or numbness/tingling  Behavioral/Psych: positive for anxiety  All other systems reviewed and are negative    Objective:     Vitals:    11/19/20 0810   BP: (!) 98/58       Constitutional: Pale and jaundiced appearing, distressed  Head: Normocephalic, atraumatic   Eyes: Conjunctivae normal   Neck: Supple, symmetrical   Respiration: Non-labored breathing   Cardiovascular: Limbs warm and well perfused   Abdomen: Diffuse tenderness to palpation in all quadrants. Hypoactive BS in all 4 quadrants. Neuro: Alert, oriented. CN II-XII appear grossly intact. No focal deficits appreciated.   Skin: no skin rashes or lesions visible on abdomen Psychological: Distraught and guarded    Assessment:    Diagnosis Orders   1. Generalized abdominal pain     2. Pancreatic cancer metastasized to liver (HCC)     3. Other chronic pain     4. Nausea and vomiting, intractability of vomiting not specified, unspecified vomiting type         Gabby Dewey is a 76 y.o.male presenting to the pain clinic for evaluation of severe abdominal pain secondary to metastatic pancreatic cancer. Due to his severe excruciating pain even with long and short acting morphine, concern for stool burden, nausea/vomiting, and severely diminished appetite I made the decision to admit the patient directly to the hospital for closer management and observation. We may pursue a celiac plexus block once the patient is more medically optimized and stable. Plan: The following treatment recommendations and plan were discussed in detail with Gabby Dewey and friend. Imaging:   I have reviewed patients imaging of CT Abd/Pelvis and results were discussed with patient today. Analgesics: The patient is currently managing their pain with the use of morphine extended release 30 mg/30 mg/15 mg every 8 hours. In addition, he is using morphine IR 15 mg-about 4 tablets/day. Adjuvants:   Patient is taking gabapentin 400 mg 3 times a day. I would encourage patient continue this medication. Interventions:   No intervention pursued at this visit. We may consider celiac plexus block when the patient is more medically optimized. Anticoagulation/NPO Recommendations:   No intervention pursued at this visit. Multidisciplinary Pain Management:   In the presence of complex, chronic, and multi-factorial pain, the importance of a multidisciplinary approach to pain management in the patients management regimen was emphasized and discussed in great detail.      Referrals:  None    Prescriptions Written This Visit:   None    Follow-up: PRN      I spent 60 minutes with the patient with greater than 50% time spent discussing treatment options (including celiac plexus block and medication management), coordination of care, and transferring the patient to inpatient services.       Marissa Hodge, DO  Interventional Pain Management/PM&R   [place]

## 2020-11-19 NOTE — CONSULTS
Pain Consult Note      Admitting Physician: Libby Beatty MD    Primary Care Provider: Shaina Lopez MD     Reason for Consult:  Pain Management Consult requested by Dr. Ladan Jeronimo for this patient with uncontrolled cancer pain despite using MS ER and MS IR. History of Present Illness:  Adarsh Meyer is a 76 y.o. male admitted to [place] [address] on 11/19/2020. This patient with a history significant for pancreatic cancer, prostate cancer arthritis, anxiety, and sleep apnea who presented to [address] as a direct admit sent from Dr. Maureen Kerr today during his evaluation in the outpatient pain management clinic for his uncontrolled cancer related pain. His history of prostate cancer relates back to 2015 and he had chemo and radiation therapy and was in remission up until he developed pain in his abdomen which started in mid October and work up revealed a necrotic mass involving the neck, proximal body of the pancreas. A biopsy was comp;eted on 10/27/23856 which revealed metastatic pancreatic cancer. Since his pain started it has gradually increased and he was initially started on Percocet which he did not tolerate and states that it did not help and then this was changed to [facility] [address] ER and MS IR at the beginning of November. Recent changes and increases were made to his medications and currently prior to admission he is taking MS Contin 30 mg scheduled every 12 hours and MS IR 15 mg which he has been taking every 4-6 hours for pain and his pain remains not well controlled. During my evaluation today he has complaints of severe shooting pain throughout his abdomen which is constant and radiating into his mid back and left side. The pain makes him short of breath and he has not been able to sit up strait or tolerate any activity. He has been having loose stools and an abdominal KUB was ordered.      The patient was seen by the pain team today and at the time of our evaluation the patient complains of pain across his abdomen worse in his right lower abdomen and left lower abdomen and describes it as a constant shooting pain which radiates to his mid back and left side and this is described as sharp and aching. He currently rates his pain at a 9/10. Pain is aggravated with any movement, deep breathing and increases to 10/10. The lowest his pain has decreased with his current home pain medications is 7-8/10. His pain goal is 3-4/10. His appetite is very poor and he has been losing weight and has associated nausea. Past Medical History:        Diagnosis Date    Anxiety     Arthritis     [facility] ([facility] [address]     Chronic skin ulcer ([facility] [address] [facility]     Hyperlipidemia     Pancreatic cancer ([facility] [address] [facility]     Prostate cancer ([facility] [address]     Sleep apnea        Past Surgical History:        Procedure Laterality Date    APPENDECTOMY      BACK SURGERY      KNEE CARTILAGE SURGERY Right 1977    KNEE SURGERY Left     1980    NASAL SEPTUM SURGERY Left        Allergies:     Allergies   Allergen Reactions    Anaprox [Naproxen] Itching    Vicodin [Hydrocodone-Acetaminophen] Nausea Only        Current Medications:   Current Facility-Administered Medications: sodium chloride flush 0.9 % injection 10 mL, 10 mL, Intravenous, 2 times per day  sodium chloride flush 0.9 % injection 10 mL, 10 mL, Intravenous, PRN  acetaminophen (TYLENOL) tablet 650 mg, 650 mg, Oral, Q6H PRN **OR** acetaminophen (TYLENOL) suppository 650 mg, 650 mg, Rectal, Q6H PRN  polyethylene glycol (GLYCOLAX) packet 17 g, 17 g, Oral, Daily PRN  promethazine (PHENERGAN) tablet 12.5 mg, 12.5 mg, Oral, Q6H PRN **OR** ondansetron (ZOFRAN) injection 4 mg, 4 mg, Intravenous, Q6H PRN  enoxaparin (LOVENOX) injection 40 mg, 40 mg, Subcutaneous, Daily  docusate sodium (COLACE) capsule 100 mg, 100 mg, Oral, BID  gabapentin (NEURONTIN) capsule 400 mg, 400 mg, Oral, TID  morphine (MS CONTIN) extended release tablet 30 mg, 30 mg, Oral, BID  morphine (MSIR) tablet 15 mg, 15 mg, Oral, Q4H PRN  Morphine Sulfate ER T12A 30 mg, 30 mg, Oral, Q12H  tamsulosin (FLOMAX) capsule 0.4 mg, 0.4 mg, Oral, Daily    Social History:  Social History     Socioeconomic History    Marital status:      Spouse name: Not on file    Number of children: Not on file    Years of education: Not on file    Highest education level: Not on file   Occupational History    Not on file   Social Needs    Financial resource strain: Not on file    Food insecurity     Worry: Not on file     Inability: Not on file    Transportation needs     Medical: Not on file     Non-medical: Not on file   Tobacco Use    Smoking status: Current Every Day Smoker     Packs/day: 1.00     Years: 50.00     Pack years: 50.00     Types: Cigarettes     Start date: 1970    Smokeless tobacco: Never Used   Substance and Sexual Activity    Alcohol use: Not Currently    Drug use: Not Currently    Sexual activity: Not Currently     Partners: Female   Lifestyle    Physical activity     Days per week: Not on file     Minutes per session: Not on file    Stress: Not on file   Relationships    Social connections     Talks on phone: Not on file     Gets together: Not on file     Attends Worship service: Not on file     Active member of club or organization: Not on file     Attends meetings of clubs or organizations: Not on file     Relationship status: Not on file    Intimate partner violence     Fear of current or ex partner: Not on file     Emotionally abused: Not on file     Physically abused: Not on file     Forced sexual activity: Not on file   Other Topics Concern    Not on file   Social History Narrative    Not on file     Family History:       Problem Relation Age of Onset    Cancer Mother     Depression Mother     Heart Disease Mother        Review of Systems:  CONSTITUTIONAL:  positive for  sweats, malaise, weight loss and decraesed appetite   EYES:  negative  HEENT:  negative  RESPIRATORY:  + tobacco use, wheezes CARDIOVASCULAR:  negative  GASTROINTESTINAL:  positive for nausea, change in bowel habits, diarrhea and abdominal pain, pancreatic cancer   GENITOURINARY:  negative  SKIN:  jaundiced  HEMATOLOGIC/LYMPHATIC:  negative  MUSCULOSKELETAL:  positive for  myalgias, arthralgias and pain  NEUROLOGICAL:  negative  BEHAVIOR/PSYCH:  positive for agitated and anxiety  System review otherwise negative      Physical Exam:  BP (!) 107/53   Pulse 115   Temp 98.8 °F (37.1 °C) (Oral)   Resp 18   Ht 5' 10\" (1.778 m)   Wt 161 lb 2.5 oz (73.1 kg)   SpO2 92%   BMI 23.12 kg/m²     awake  Orientation:   person, place, time  Mood: anxious and irritable  Affect: anxious and quiet  General appearance: mild distress, pale, anxious, thin, ill appearing     Memory:   normal,   Attention/Concentration: normal  Language:  normal    Cranial Nerves:  cranial nerves II-XII are grossly intact  ROM:  Normal ROM in all 4 extremities   Motor Exam:  Motor exam is symmetrical 5 out of 5 all extremities bilaterally  Tone:  normal    + tenderness throughout mid left side of back     Heart: Tachy rate, regular rhythm, normal S1, S2, no murmurs, rubs, clicks or gallops  Lungs: Diminished, scattered wheezes throughout  Abdomen: soft, tender and guarded, non-distended, hypoactive bowel sounds thrpughout, no masses or organomegaly    Skin: warm and dry, no rash or erythema  Peripheral vascular: Pulses: Normal upper and lower extremity pulses; Edema: trace      Diagnostics:  No results found for this or any previous visit (from the past 24 hour(s)). Impression:  1. Metastatic pancreatic cancer   2. Cancer associated pain   3. Abdominal pain     Met with today's pain team as above. Discussed patient symptoms, reviewed medications and possible drug interactions, medication side effect profiles, previous medications and their efficacies, medical concerns regarding each pain management option (ie blood pressure, GI concerns, etc).  Also reviewed labs (including creatinine clearance and LFT's regarding medication metabolism). Also reviewed all work-up studies including radiologic and other consultants. OARRS report was obtained and reviewed. Recommendations:  1. Increased frequency MS Contin to 30 mg scheduled every 8 hours for better maintenance pain control   2. Continue tylenol 650 mg every 4 hours as needed for midl pain of 1-3/10 or fever  3. Discontinued MS IR as it only comes in 15 mg tabs, started morphine oral liquid solution 10 mg /5 ml solution at an increased dosage of 20 mg every 4 hours as needed for moderate to severe breakthrough pain of 4-/10/10   4. At this time will continue current IV dilaudid that is ordered at 1 mg every 4 hours as needed for sever pain of 8-10/10 unrelieved after all oral pain medications given first but I encouraged patient to maximize oral pain medications first because that is what he will be discharged home with   5. Started Lidoderm patches 3 patches daily to painful areas on back and abdomen  6. Continue home Neurontin and also discussed adding a muscle relaxer but will wait at this time d/t the other medication changes  7. Agree with palliative care consult which was already ordered to help with pain control and planning   8. Continue current bowel program, awaiting abdominal KUB  9. Patient would most likely benefit from a celiac plexus nerve block in the future and will continue to follow with Dr. Rojelio Betancur outpatient when pain is better controlled   10. Will continue to follow while in the hospital, will make changes as needed. Will reevaluate next on Monday     I spent 60 minutes evaluating this patient and completing documentation. It was my pleasure to evaluate Huber Jaqui today. Please call with questions.     Pavan Barrow, APRN - CNP

## 2020-11-20 ENCOUNTER — HOSPITAL ENCOUNTER (OUTPATIENT)
Dept: INFUSION THERAPY | Age: 65
Discharge: HOME | End: 2020-11-20
Payer: MEDICARE

## 2020-11-20 LAB
ANION GAP SERPL CALCULATED.3IONS-SCNC: 12 MEQ/L (ref 8–16)
BASOPHILS # BLD: 0.4 %
BASOPHILS ABSOLUTE: 0.1 THOU/MM3 (ref 0–0.1)
BUN BLDV-MCNC: 27 MG/DL (ref 7–22)
CALCIUM SERPL-MCNC: 8.8 MG/DL (ref 8.5–10.5)
CHLORIDE BLD-SCNC: 88 MEQ/L (ref 98–111)
CO2: 26 MEQ/L (ref 23–33)
CREAT SERPL-MCNC: 0.6 MG/DL (ref 0.4–1.2)
EOSINOPHIL # BLD: 8.4 %
EOSINOPHILS ABSOLUTE: 1.1 THOU/MM3 (ref 0–0.4)
ERYTHROCYTE [DISTWIDTH] IN BLOOD BY AUTOMATED COUNT: 13.1 % (ref 11.5–14.5)
ERYTHROCYTE [DISTWIDTH] IN BLOOD BY AUTOMATED COUNT: 44.1 FL (ref 35–45)
GFR SERPL CREATININE-BSD FRML MDRD: > 90 ML/MIN/1.73M2
GLUCOSE BLD-MCNC: 108 MG/DL (ref 70–108)
HCT VFR BLD CALC: 31.6 % (ref 42–52)
HEMOGLOBIN: 11 GM/DL (ref 14–18)
IMMATURE GRANS (ABS): 0.25 THOU/MM3 (ref 0–0.07)
IMMATURE GRANULOCYTES: 1.8 %
LYMPHOCYTES # BLD: 5.8 %
LYMPHOCYTES ABSOLUTE: 0.8 THOU/MM3 (ref 1–4.8)
MCH RBC QN AUTO: 32.2 PG (ref 26–33)
MCHC RBC AUTO-ENTMCNC: 34.8 GM/DL (ref 32.2–35.5)
MCV RBC AUTO: 92.4 FL (ref 80–94)
MONOCYTES # BLD: 4.1 %
MONOCYTES ABSOLUTE: 0.6 THOU/MM3 (ref 0.4–1.3)
NUCLEATED RED BLOOD CELLS: 0 /100 WBC
OSMOLALITY URINE: 634 MOSMOL/KG (ref 250–750)
PLATELET # BLD: 83 THOU/MM3 (ref 130–400)
PMV BLD AUTO: 11.6 FL (ref 9.4–12.4)
POTASSIUM REFLEX MAGNESIUM: 4.7 MEQ/L (ref 3.5–5.2)
RBC # BLD: 3.42 MILL/MM3 (ref 4.7–6.1)
SEG NEUTROPHILS: 79.5 %
SEGMENTED NEUTROPHILS ABSOLUTE COUNT: 10.8 THOU/MM3 (ref 1.8–7.7)
SODIUM BLD-SCNC: 126 MEQ/L (ref 135–145)
SODIUM URINE: < 20 MEQ/L
WBC # BLD: 13.6 THOU/MM3 (ref 4.8–10.8)

## 2020-11-20 PROCEDURE — 80048 BASIC METABOLIC PNL TOTAL CA: CPT

## 2020-11-20 PROCEDURE — 36415 COLL VENOUS BLD VENIPUNCTURE: CPT

## 2020-11-20 PROCEDURE — 6360000002 HC RX W HCPCS: Performed by: STUDENT IN AN ORGANIZED HEALTH CARE EDUCATION/TRAINING PROGRAM

## 2020-11-20 PROCEDURE — 2580000003 HC RX 258: Performed by: STUDENT IN AN ORGANIZED HEALTH CARE EDUCATION/TRAINING PROGRAM

## 2020-11-20 PROCEDURE — 84300 ASSAY OF URINE SODIUM: CPT

## 2020-11-20 PROCEDURE — 83935 ASSAY OF URINE OSMOLALITY: CPT

## 2020-11-20 PROCEDURE — 97535 SELF CARE MNGMENT TRAINING: CPT

## 2020-11-20 PROCEDURE — 6360000002 HC RX W HCPCS: Performed by: NURSE PRACTITIONER

## 2020-11-20 PROCEDURE — 6370000000 HC RX 637 (ALT 250 FOR IP): Performed by: STUDENT IN AN ORGANIZED HEALTH CARE EDUCATION/TRAINING PROGRAM

## 2020-11-20 PROCEDURE — 97165 OT EVAL LOW COMPLEX 30 MIN: CPT

## 2020-11-20 PROCEDURE — 85025 COMPLETE CBC W/AUTO DIFF WBC: CPT

## 2020-11-20 PROCEDURE — 97530 THERAPEUTIC ACTIVITIES: CPT

## 2020-11-20 PROCEDURE — 1200000000 HC SEMI PRIVATE

## 2020-11-20 PROCEDURE — 6370000000 HC RX 637 (ALT 250 FOR IP): Performed by: NURSE PRACTITIONER

## 2020-11-20 RX ADMIN — TAMSULOSIN HYDROCHLORIDE 0.4 MG: 0.4 CAPSULE ORAL at 09:45

## 2020-11-20 RX ADMIN — GABAPENTIN 400 MG: 400 CAPSULE ORAL at 09:45

## 2020-11-20 RX ADMIN — DOCUSATE SODIUM 100 MG: 100 CAPSULE, LIQUID FILLED ORAL at 21:22

## 2020-11-20 RX ADMIN — MORPHINE SULFATE 20 MG: 10 SOLUTION ORAL at 21:23

## 2020-11-20 RX ADMIN — MORPHINE SULFATE 30 MG: 30 TABLET, FILM COATED, EXTENDED RELEASE ORAL at 17:49

## 2020-11-20 RX ADMIN — Medication 10 ML: at 07:20

## 2020-11-20 RX ADMIN — SODIUM CHLORIDE, PRESERVATIVE FREE 10 ML: 5 INJECTION INTRAVENOUS at 09:45

## 2020-11-20 RX ADMIN — ENOXAPARIN SODIUM 40 MG: 40 INJECTION SUBCUTANEOUS at 17:49

## 2020-11-20 RX ADMIN — SODIUM CHLORIDE, PRESERVATIVE FREE 10 ML: 5 INJECTION INTRAVENOUS at 21:22

## 2020-11-20 RX ADMIN — GABAPENTIN 400 MG: 400 CAPSULE ORAL at 13:23

## 2020-11-20 RX ADMIN — HYDROMORPHONE HYDROCHLORIDE 1 MG: 1 INJECTION, SOLUTION INTRAMUSCULAR; INTRAVENOUS; SUBCUTANEOUS at 07:20

## 2020-11-20 RX ADMIN — MORPHINE SULFATE 30 MG: 30 TABLET, FILM COATED, EXTENDED RELEASE ORAL at 09:45

## 2020-11-20 RX ADMIN — MORPHINE SULFATE 30 MG: 30 TABLET, FILM COATED, EXTENDED RELEASE ORAL at 03:32

## 2020-11-20 RX ADMIN — NALOXEGOL OXALATE 12.5 MG: 12.5 TABLET, FILM COATED ORAL at 09:45

## 2020-11-20 RX ADMIN — GABAPENTIN 400 MG: 400 CAPSULE ORAL at 21:23

## 2020-11-20 RX ADMIN — DOCUSATE SODIUM 100 MG: 100 CAPSULE, LIQUID FILLED ORAL at 09:45

## 2020-11-20 RX ADMIN — MORPHINE SULFATE 20 MG: 10 SOLUTION ORAL at 13:29

## 2020-11-20 ASSESSMENT — PAIN SCALES - GENERAL
PAINLEVEL_OUTOF10: 8
PAINLEVEL_OUTOF10: 10
PAINLEVEL_OUTOF10: 8

## 2020-11-20 ASSESSMENT — PAIN DESCRIPTION - DIRECTION: RADIATING_TOWARDS: BACK

## 2020-11-20 ASSESSMENT — PAIN DESCRIPTION - PAIN TYPE
TYPE: CHRONIC PAIN
TYPE: INTRACTABLE PAIN

## 2020-11-20 ASSESSMENT — PAIN DESCRIPTION - DESCRIPTORS: DESCRIPTORS: SHARP

## 2020-11-20 ASSESSMENT — PAIN DESCRIPTION - LOCATION
LOCATION: ABDOMEN;BACK
LOCATION: ABDOMEN;BACK

## 2020-11-20 ASSESSMENT — PAIN - FUNCTIONAL ASSESSMENT: PAIN_FUNCTIONAL_ASSESSMENT: PREVENTS OR INTERFERES SOME ACTIVE ACTIVITIES AND ADLS

## 2020-11-20 ASSESSMENT — PAIN DESCRIPTION - ORIENTATION: ORIENTATION: RIGHT;MID

## 2020-11-20 ASSESSMENT — PAIN DESCRIPTION - PROGRESSION: CLINICAL_PROGRESSION: NOT CHANGED

## 2020-11-20 ASSESSMENT — PAIN DESCRIPTION - ONSET: ONSET: PROGRESSIVE

## 2020-11-20 ASSESSMENT — PAIN DESCRIPTION - FREQUENCY: FREQUENCY: CONTINUOUS

## 2020-11-20 NOTE — PROGRESS NOTES
[address]  INPATIENT OCCUPATIONAL THERAPY  [facility] ONC MED 5K  EVALUATION    Time:   Time In: 4072  Time Out: 2170  Timed Code Treatment Minutes: 23 Minutes  Minutes: 38          Date: 2020  Patient Name: Obdulio Lawrence,   Gender: male      MRN: [id number]  : 1955  (76 y.o.)  Referring Practitioner: Dr. Addison Manzo MD  Diagnosis: Pancreatic Cancer  Additional Pertinent Hx: Pt medical history of prostate cancer (2015 s/p chemoradiation) and recent diagnosis of pancreatic cancer. He initially experienced abdominal pain several months ago and upon evaluation with a CT abdomen pelvis on 10/21/2020 revealed a necrotic mass involving the neck, proximal body of the pancreas. There is also some mass involving the stomach concerning for malignancy. A biopsy done on 10/27/20 revealed metastatic pancreatic cancer. He was evaluated by the oncology team on 11/10/2020 in which he underwent his first treatment of chemotherapy. In addition, there were plans to admit the patient directly for pain management however due to the ED being overcrowded he was unable to be admitted. In addition, they increased his long-acting morphine to 30/30/15 mg in 8-hour intervals with 15 mg morphine immediate release for breakthrough pain. He states he has been taking about 4 of the 50 mg immediate release tablets per day. He states that despite this increase his pain is still excruciating 10/10. He is unable to even sit upright due to the pain being so severe. The worst of his pain is on the right side of the abdomen but will radiate through both of the lower parts of his abdomen and up the entire abdomen. He also endorses severe pain radiating around to his back. He states that he has very poor appetite with associated nausea/vomiting.   In addition, he notes that he has been able to pass bowel movements for a few days and has been only having very loose stools. Restrictions/Precautions:  Restrictions/Precautions: General Precautions    Subjective  Chart Reviewed: Yes, Orders, History and Physical    Subjective: Pleasant and cooperative  Comments: RN approved session. Pt was getting up to the edge of the bed. He C/O abdominal pain that starts on his R side and radiates throughout his back. He had just had pain  med which had yet to take effect. Pain:  Pain Assessment  Patient Currently in Pain: Yes  Pain Assessment: 0-10  Pain Level: 8  Pain Type: Intractable pain  Pain Location: Abdomen;Back  Pain Orientation: Right;Mid  Pain Radiating Towards: back  Pain Descriptors: Sharp  Pain Frequency: Continuous  Clinical Progression: Not changed  Patient's Stated Pain Goal: 3  Response to Pain Intervention: None  Multiple Pain Sites: No    Social/Functional History:  Lives With: Friend(s)  Type of Home: House  Home Layout: Two level, Able to Live on Main level with bedroom/bathroom  Home Access: Stairs to enter with rails  Entrance Stairs - Number of Steps: 4-5 steps   Bathroom Shower/Tub: Tub/Shower unit  Bathroom Toilet: Standard  Bathroom Accessibility: Accessible    Receives Help From: Friend(s)  ADL Assistance: Independent  Homemaking Assistance: Needs assistance  Homemaking Responsibilities: Yes  Ambulation Assistance: Independent  Transfer Assistance: Independent    Active : Yes  Occupation: Other(comment)(Pt is on medical leave of abscence)  Type of occupation: Maintenance  Leisure & Hobbies: Outdoor activity- hunting and fishing when able  Additional Comments: Pt was working until Oct. 20.  He has been having difficulty with fatigue and some loss of weight as he has also been having nausea along with the abdominal pain. NO AD used for ambulating.     Cognition/Orientation:  Overall Orientation Status: Within Normal Limits  Overall Cognitive Status: WFL    ADL's:  LE Dressing: Supervision(donning his socks and shoes without having to tie the thao)  Vision - Basic Assessment  Prior Vision: Wears glasses only for reading    Functional Mobility:  Bed mobility  Supine to Sit: Modified independent(using a bedrail with head of bed elevated)  Sit to Supine: Supervision  Scooting: Supervision    Functional Mobility  Functional - Mobility Device: No device  Activity: Other(In hallway around obstacles)  Assist Level: Stand by assistance  Functional Mobility Comments: Slow but even step and no LOB noted. Balance:  Balance  Sitting Balance: Supervision  Standing Balance: Stand by assistance(Preparing to walk in the hallway)  Standing Balance  Time: 45 seconds; 1 minute x 2 trials  Activity: preparing to go for a walk in hallway; looking out the windows during the walk    Transfers:  Sit to stand: Stand by assistance(from the edge of bed or from the chair)  Stand to sit: Stand by assistance(to the recliner chair)       Upper Extremity Assessment:Hand Dominance: Right  LUE AROM : WFL  Left Hand AROM: WFL  RUE AROM : WFL  Right Hand AROM: WFL    LUE Strength  L Hand General: 4/5  LUE Strength Comment: 3+/5 deltoid; 3+/5 pectoral; 4/5 biceps/triceps  RUE Strength  R Hand General: 4/5  RUE Strength Comment: 3+/5 deltoid; 3+/5 pectoral; 4/5 biceps/triceps      Sensation  Overall Sensation Status: WNL  Fine Motor Skills  Fine Motor Comment: No difficulty noted with doing self care including donning socks and shoes. He fed himself with no difficulty noted with packets on his tray. Activity Tolerance: Patient limited by fatigue, Patient limited by pain  Pt was having mild fatigue while walking but did request to sit for a rest break for a couple of minutes before resumption of the walk. He stated that his pain was about the same. He was sitting in the chair and his nurse was in the room with him at the end of the session. Assessment:  Assessment: Patient would benefit from continued skilled OT services to address above deficits.   He presents with pancreatic cancer and abdominal pain. Pt had been independent with his self care prior to admission. He ambulates without any AD. Pt demonstrated self care and also walked with SBA. He does have fatigue and needed rest breaks during the walk. Pt is motivated to stay active if his pain can be managed. Performance deficits / Impairments: Decreased functional mobility , Decreased ADL status, Decreased endurance, Decreased strength, Decreased high-level IADLs  Prognosis: Good  REQUIRES OT FOLLOW UP: Yes  Decision Making: Low Complexity    Treatment Initiated: Treatment and education initiated within context of evaluation. Evaluation time included review of current medical information, gathering information related to past medical, social and functional history, completion of standardized testing, formal and informal observation of tasks, assessment of data and development of plan of care and goals. Treatment time included skilled education and facilitation of tasks to increase safety and independence with ADL's for improved functional independence and quality of life. Pt discussed his home routine. He still drives and has been on medical leave of absence. He is living with friends at this time. Pt stated that his appetite is poor and that he gets full easily. He was encouraged to talk with a dietician about how he can maintain his nutritional needs when he tolerates eating less than he is used to. He enjoys still being active. Pt agrees to work with therapy to help maintain his strength and increase his activity tolerance. Discharge Recommendations:  Continue to assess pending progress, Patient would benefit from continued therapy after discharge    Patient Education:  OT Education: OT Role, Plan of Care  Patient Education: Importance of keeping up his strength and also being ready to adapt how he gets his nutritional needs met- more supplement drinks if he gets full easily from solid foods.     Equipment Recommendations: Other: Will continue to monitor    Plan:  Times per week: 5x  Current Treatment Recommendations: Self-Care / ADL, Endurance Training, Functional Mobility Training, Strengthening  Plan Comment: Pt would be able to return to home with help if needed when medically stable and discharged from Acute. No follow up OT recommended after discharge. Specific instructions for Next Treatment: Funtional mobility; ADLs and adaptations for pain management if needed; endurance training and HEP    Goals:  Patient goals : \"I want to be able to tolerate doing things better. \" pt states. Short term goals  Time Frame for Short term goals: By discharge  Short term goal 1: Pt will demonstrate functional mobility walking on various surfaces with supervision and no LOB while taking rest breaks as needed to increase his endurance for ease of doing community mobility and working in the yard. Short term goal 2: Pt will complete BUE moderate/high resistance HEP while following any handout needed and taking short rest breaks to increase his strength and endurance for ease of doing ADLs and yardwork. Short term goal 3: Pt will complete BADLs with supervision while using any AE needed to increase his safety and help with pain management for returning to being independent at home. Short term goal 4: Pt will complete standing ADLs for over 8 minute duration with cues for back protection to increase his endurance and help manage his pain for ease of showering or dressing. See long-term goal time frame for expected duration of plan of care. If no long-term goals established, a short length of stay is anticipated. Following session, patient left in safe position with all fall risk precautions in place.

## 2020-11-20 NOTE — PROGRESS NOTES
[address]  PHYSICAL THERAPY MISSED TREATMENT NOTE  [facility] ONC MED 5K    Date: 2020  Patient Name: Hanny Wolf        MRN: [id number]   : 1955  (76 y.o.)  Gender: male                REASON FOR MISSED TREATMENT:  Patient at testing and/or off unit.

## 2020-11-20 NOTE — PROGRESS NOTES
[facility] Palliative Care           Progress Note    Patient Name:  Briana Barrios  Medical Record Number:  [id number]  YOB: 1955    Date:  11/20/2020  Time:  1:40 PM  Completed By:  Linda Sullivan RN    Reason for Palliative Care Evaluation:  Goals of care/code status    Current Issues:  [x]  Pain  []  Fatigue  []  Nausea  []  Anxiety  []  Depression  []  Shortness of Breath  []  Constipation  []  Appetite  []  Other:    Advance Directives:none on file  [] [facility] DNR Form  [] Living Will  [] Medical POA    Current Code Status  [x] Full Resuscitation  [] DNR-Comfort Care-Arrest  [] DNR-Comfort Care  [] Limited   [] No CPR   [] No shock   [] No ET intubation/reintubation   [] No resuscitative medications   [] Other limitation:    Performance Status:  70    Family/Patient Discussion:  Patient sitting on the edge of bed. Patient is alert and oriented to all spheres. Palliative care introduced. Discussion about advance directives and their significance and patient would like to complete advance directives. Discussion about code status levels and what each level entails. Discussed complications of rib fractures, brain and organ damage associated with resuscitative measures. Patient verbalizes understanding that he is a full code and if a change is desired, he must alert staff. Emotional support provided. Plan/Follow-Up:  Updated Bhavana RN. Spiritual care consult placed to assist with completion of advance directives. Please call palliative care if further needs arise.     Linda Sullivan RN  11/20/2020,  1:40 PM

## 2020-11-20 NOTE — CARE COORDINATION
DISASTER CHARTING    11/20/20, 7:52 AM EST    DISCHARGE ONGOING EVALUATION:     Izabel Richards day: 1  Location: [facility]10/010-A Reason for admit: Pancreatic cancer ([facility] [address] [C25.9]   Barriers to Discharge: pain control, IV Dilaudid, MS contin, dietitian, palliative care consulted. PCP: Raquel Granda MD  Patient Goals/Plan/Treatment Preferences:   Direct admit outpatient pain management clinic; uncontrolled cancer pain, biopsy was completed on 10/27/98823 which revealed metastatic pancreatic cancer. From home with spouse, friend Estephanie rice, he uses no assist devices. He has walker, crutches, and would like wheelchair as he gets short of breath to go to Dr appointments. Declines HH or other needs.

## 2020-11-21 PROBLEM — E43 SEVERE MALNUTRITION (HCC): Status: ACTIVE | Noted: 2020-11-21

## 2020-11-21 PROCEDURE — 1200000000 HC SEMI PRIVATE

## 2020-11-21 PROCEDURE — 6370000000 HC RX 637 (ALT 250 FOR IP): Performed by: STUDENT IN AN ORGANIZED HEALTH CARE EDUCATION/TRAINING PROGRAM

## 2020-11-21 PROCEDURE — 99232 SBSQ HOSP IP/OBS MODERATE 35: CPT | Performed by: OPHTHALMOLOGY

## 2020-11-21 PROCEDURE — 6370000000 HC RX 637 (ALT 250 FOR IP): Performed by: NURSE PRACTITIONER

## 2020-11-21 PROCEDURE — 2580000003 HC RX 258: Performed by: STUDENT IN AN ORGANIZED HEALTH CARE EDUCATION/TRAINING PROGRAM

## 2020-11-21 RX ADMIN — DOCUSATE SODIUM 100 MG: 100 CAPSULE, LIQUID FILLED ORAL at 20:43

## 2020-11-21 RX ADMIN — MORPHINE SULFATE 30 MG: 30 TABLET, FILM COATED, EXTENDED RELEASE ORAL at 00:59

## 2020-11-21 RX ADMIN — GABAPENTIN 400 MG: 400 CAPSULE ORAL at 20:43

## 2020-11-21 RX ADMIN — DOCUSATE SODIUM 100 MG: 100 CAPSULE, LIQUID FILLED ORAL at 09:25

## 2020-11-21 RX ADMIN — GABAPENTIN 400 MG: 400 CAPSULE ORAL at 09:25

## 2020-11-21 RX ADMIN — MORPHINE SULFATE 20 MG: 10 SOLUTION ORAL at 05:26

## 2020-11-21 RX ADMIN — TAMSULOSIN HYDROCHLORIDE 0.4 MG: 0.4 CAPSULE ORAL at 09:25

## 2020-11-21 RX ADMIN — MORPHINE SULFATE 20 MG: 10 SOLUTION ORAL at 12:48

## 2020-11-21 RX ADMIN — MORPHINE SULFATE 20 MG: 10 SOLUTION ORAL at 20:43

## 2020-11-21 RX ADMIN — NALOXEGOL OXALATE 12.5 MG: 12.5 TABLET, FILM COATED ORAL at 09:25

## 2020-11-21 RX ADMIN — SODIUM CHLORIDE, PRESERVATIVE FREE 10 ML: 5 INJECTION INTRAVENOUS at 09:26

## 2020-11-21 RX ADMIN — SODIUM CHLORIDE, PRESERVATIVE FREE 10 ML: 5 INJECTION INTRAVENOUS at 20:46

## 2020-11-21 RX ADMIN — GABAPENTIN 400 MG: 400 CAPSULE ORAL at 12:49

## 2020-11-21 RX ADMIN — MORPHINE SULFATE 30 MG: 30 TABLET, FILM COATED, EXTENDED RELEASE ORAL at 09:25

## 2020-11-21 RX ADMIN — MORPHINE SULFATE 30 MG: 30 TABLET, FILM COATED, EXTENDED RELEASE ORAL at 17:45

## 2020-11-21 RX ADMIN — POLYETHYLENE GLYCOL 3350 17 G: 17 POWDER, FOR SOLUTION ORAL at 20:43

## 2020-11-21 ASSESSMENT — PAIN SCALES - GENERAL
PAINLEVEL_OUTOF10: 7
PAINLEVEL_OUTOF10: 9
PAINLEVEL_OUTOF10: 8
PAINLEVEL_OUTOF10: 9
PAINLEVEL_OUTOF10: 8
PAINLEVEL_OUTOF10: 8

## 2020-11-21 NOTE — FLOWSHEET NOTE
11/21/20 1437   Encounter Summary   Services provided to: Patient   Referral/Consult From: [employer] Spouse; Family members   Continue Visiting Yes  (11/21)   Complexity of Encounter Moderate   Length of Encounter 30 minutes   Spiritual Assessment Completed Yes   Advance Care Planning Yes   Routine   Type Initial   Assessment Calm; Approachable   Intervention Active listening;Nurtured hope;Prayer   Outcome Acceptance;Comfort;Expressed gratitude   Advance Directives (For Healthcare)   Healthcare Directive No, patient does not have an advance directive for healthcare treatment   Advance Directives Documents given;Documents explained   Assessment:  During my encounter with the 76 yr old patient, I gave the patient a copy of the Advance Directive as requested. I assess the pt's spiritual needs. The pt was anxious and was admitted for pancreatic cancer. The pt was concerned about possible animosity between his spouse and children. Interventions:  I gave the pt the requested documents and gave a brochure and asked if the patient had any questions. I offered words of comfort and prayer. Outcomes: The pt was thankful for the spiritual support. The patient shared that they would complete the forms after they had the opportunity to review the documents. Plan:  1. Chaplains will follow-up at a later time in order to assist the patient as they complete the Advance Directive documents.         2.   The Chaplains will be available to provide further emotional support per request.

## 2020-11-21 NOTE — PROGRESS NOTES
Hospitalist Progress Note      Patient:  Stephane Beauhcamp    Unit/Bed:5K-10/010-A  YOB: 1955  MRN: [id number]   Acct: [de-identified]   PCP: Abdon Prabhakar MD  Date of Admission: 11/19/2020    Assessment/Plan:    1. ***: ***  11/20/20: ***  2. ***: ***  11/20/20: ***  3. ***: ***  11/20/20: ***  4. ***: ***  11/20/20: ***        Expected discharge date:  ***    Disposition:    [] Home       [] TCU       [] Rehab       [] Psych       [] SNF       [] [place]       [] Other-    Chief Complaint: ***    Hospital Treatment Course: (Prior to today) ***    11/20/20: ***     Subjective (past 24 hours): ***       Past medical history, family history, social history and allergies reviewed again and is unchanged since admission. ROS (12 point review of systems completed. Pertinent positives noted. Otherwise ROS is negative)     Medications:  Reviewed    Infusion Medications   Scheduled Medications    sodium chloride flush  10 mL Intravenous 2 times per day    enoxaparin  40 mg Subcutaneous Daily    docusate sodium  100 mg Oral BID    gabapentin  400 mg Oral TID    tamsulosin  0.4 mg Oral Daily    naloxegol  12.5 mg Oral QAM    lidocaine  3 patch Transdermal Daily    morphine  30 mg Oral Q8H     PRN Meds: sodium chloride flush, polyethylene glycol, promethazine **OR** ondansetron, acetaminophen **OR** [DISCONTINUED] acetaminophen, HYDROmorphone, morphine      Intake/Output Summary (Last 24 hours) at 11/20/2020 2013  Last data filed at 11/20/2020 7220  Gross per 24 hour   Intake 600 ml   Output --   Net 600 ml       Diet:  DIET GENERAL;    Exam:  BP (!) 110/54   Pulse 100   Temp 98.6 °F (37 °C) (Oral)   Resp 18   Ht 5' 10\" (1.778 m)   Wt 162 lb 9.6 oz (73.8 kg)   SpO2 93%   BMI 23.33 kg/m²   General appearance: *** No apparent distress, appears stated age and cooperative. HEENT: Pupils equal, round, and reactive to light. Conjunctivae/corneas clear. Neck: Supple, with full range of motion. No jugular venous distention. Trachea midline. Respiratory:  Normal respiratory effort. Clear to auscultation, bilaterally without Rales/Wheezes/Rhonchi. Cardiovascular: Regular rate and rhythm with normal S1/S2 without murmurs, rubs or gallops. Abdomen: Soft, non-tender, non-distended with normal bowel sounds. Musculoskeletal: passive and active ROM x 4 extremities. Skin: Skin color, texture, turgor normal.  No rashes or lesions. Neurologic:  Neurovascularly intact without any focal sensory/motor deficits. Cranial nerves: II-XII intact, grossly non-focal.  Psychiatric: Alert and oriented, thought content appropriate, normal insight  Capillary Refill: Brisk,< 3 seconds   Peripheral Pulses: +2 palpable, equal bilaterally     Labs:   Recent Labs     11/19/20  1609 11/20/20  0416   WBC 11.6* 13.6*   HGB 11.0* 11.0*   HCT 31.5* 31.6*   PLT 85* 83*     Recent Labs     11/19/20  1609 11/20/20  0416   * 126*   K 4.6 4.7   CL 86* 88*   CO2 28 26   BUN 28* 27*   CREATININE 0.6 0.6   CALCIUM 9.0 8.8   PHOS 3.7  --      Recent Labs     11/19/20  1609   AST 65*   *   BILITOT 5.0*   ALKPHOS 482*     No results for input(s): INR in the last 72 hours. No results for input(s): Winford Knott in the last 72 hours. Microbiology:    Blood culture #1: No results found for: BC    Blood culture #2:No results found for: Jhon Mcgrath    Organism:No results found for: ORG    No results found for: LABGRAM    MRSA culture only:No results found for: [facility]    Urine culture: No results found for: LABURIN    Respiratory culture: No results found for: CULTRESP    Aerobic and Anaerobic :  No results found for: LABAERO  No results found for: LABANAE    Urinalysis:    No results found for: NITRU, WBCUA, BACTERIA, RBCUA, BLOODU, SPECGRAV, GLUCOSEU    Radiology:  XR ABDOMEN (KUB) (SINGLE AP VIEW)   Final Result   Nonspecific gas pattern.             **This report has been created using voice recognition software. It may contain minor errors which are inherent in voice recognition technology. **      Final report electronically signed by Dr. Nayely Fofana on 11/19/2020 4:51 PM        Xr Abdomen (kub) (single Ap View)    Result Date: 11/19/2020  PROCEDURE: XR ABDOMEN (KUB) (SINGLE AP VIEW) CLINICAL INFORMATION: Severe abdominal pain, hx of pancreatic CA . COMPARISON: No prior study. TECHNIQUE: 2 supine projections of the abdomen FINDINGS: Bowel gas pattern is nonspecific. Gas is present to the distal colon. Gas is not definitively identified and rectum. Stomach is mildly gas distended. Loops of gas filled bowel are seen in the mid abdomen one loop of borderline dilated small bowel. Properitoneal fat stripes are preserved. Left psoas fat stripe is obscured. No pathologic calcifications are seen. There is dextroscoliosis and prior L3-L5 laminectomy. Nonspecific gas pattern. **This report has been created using voice recognition software. It may contain minor errors which are inherent in voice recognition technology. ** Final report electronically signed by Dr. Nayely Fofana on 11/19/2020 4:51 PM      Support Devices (date placed):  [] ETT []Oral / [] Nasal  [] Gastric Tube [] OG / [] NG    [] Central Venous Line (Specify Site):  [] Urinary Catheter  [] Arterial Line (Specify Site)  [] Peripheral IV access  [] Other:    DVT prophylaxis: [] Lovenox                                 [] SCDs                                 [] SQ Heparin                                 [] Encourage ambulation           [] Already on Anticoagulation     Code Status: Full Code    Tele:   [] yes             [] no    Electronically signed by Andre Pichardo DO on 11/20/2020 at 8:13 PM

## 2020-11-21 NOTE — PROGRESS NOTES
Comprehensive Nutrition Assessment    Type and Reason for Visit:  Initial, Consult(Poor oral intake, malnutrition)    Nutrition Recommendations/Plan:   Will send Ensure Enlive TID. Encouraged po, ONS intake at best efforts - encouraged pt. To sip on ONS between meals as able. Consider MVI. Nutrition Assessment:    Pt. severely malnourished AEB criteria listed below. At risk for further nutritional compromise r/t pain issues, catabolic illness (pancreatic cancer), chemotherapy and underlying medical condition (hx prostate cancer, reported GERD). Nutrition recommendations/interventions as per above. Malnutrition Assessment:  Malnutrition Status:  Severe malnutrition    Context:  Acute Illness     Findings of the 6 clinical characteristics of malnutrition:  Energy Intake:  7 - 50% or less of estimated energy requirements for 5 or more days  Weight Loss:  No significant weight loss     Body Fat Loss:  7 - Moderate body fat loss Orbital, Triceps   Muscle Mass Loss:  1 - Mild muscle mass loss Temples (temporalis)  Fluid Accumulation:  Unable to assess     Strength:  Not Performed    Estimated Daily Nutrient Needs:  Energy (kcal):  3633-6054 kcals (28-32); Weight Used for Energy Requirements:  (75 kgm 11/21)     Protein (g):   gm (1.2-1.4); Weight Used for Protein Requirements:  (75 kgm)          Nutrition Related Findings:  Pt. seen - reports poor appetite and intake for past month; states GERD has been acting up; also attributes poor intake to pain issues; 1 BM noted past 24 hours; MD noting opiate induced constipation -  Movantik started;  Rx also includes Colace, Glycolax, Zofran; 11/20: Sodium 126, Glucose 108, BUN 27, Cr 0.6      Wounds:  None       Current Nutrition Therapies:    DIET GENERAL;  Dietary Nutrition Supplements: Standard High Calorie Oral Supplement    Anthropometric Measures:  · Height: 5' 10\" (177.8 cm)  · Current Body Weight: 164 lb 11.2 oz (74.7 kg)(11/21, generalized edema) · Admission Body Weight: 161 lb 2.5 oz (73.1 kg)(11/19, generalized edema)    · Usual Body Weight: (pt. unsure about UBW; per EMR: 11/10/20: 154# 6.4 oz)     · Ideal Body Weight: 166 lbs;    · BMI: 23.6  · BMI Categories: Normal Weight (BMI 22.0 to 24.9) age over 72       Nutrition Diagnosis:   · Severe malnutrition related to catabolic illness, inadequate protein-energy intake as evidenced by poor intake prior to admission, moderate loss of subcutaneous fat      Nutrition Interventions:   Food and/or Nutrient Delivery:  Continue Current Diet, Start Oral Nutrition Supplement  Nutrition Education/Counseling:  Education initiated(11/21 Encouraged good nutrition at best efforts. Discussed appropriate use of ONS.)   Coordination of Nutrition Care:  Continue to monitor while inpatient    Goals:  Pt. will tolerate and consume 75% or more of meals during LOS.        Nutrition Monitoring and Evaluation:   Behavioral-Environmental Outcomes:  None Identified   Food/Nutrient Intake Outcomes:  Diet Advancement/Tolerance, Food and Nutrient Intake, Supplement Intake  Physical Signs/Symptoms Outcomes:  Biochemical Data, Constipation, GI Status, Nausea or Vomiting, Fluid Status or Edema, Nutrition Focused Physical Findings, Skin, Weight     Discharge Planning:    Continue Oral Nutrition Supplement     Electronically signed by Jo Ann Stevenson RD, LD on 11/21/20 at 11:10 AM EST    Contact: [phone]

## 2020-11-21 NOTE — PROGRESS NOTES
Pt requesting to smoke outside with friend at this time, RN educated patient that he is not able to due to this being a smoke free campus and he is unable to leave the building. Pt refusing to stay in room at this time and states he is going to smoke and that his rights cannot be taken away from him. Pt states he will return to unit when he is finished.

## 2020-11-22 LAB
ANION GAP SERPL CALCULATED.3IONS-SCNC: 9 MEQ/L (ref 8–16)
BUN BLDV-MCNC: 14 MG/DL (ref 7–22)
CALCIUM SERPL-MCNC: 8.5 MG/DL (ref 8.5–10.5)
CHLORIDE BLD-SCNC: 86 MEQ/L (ref 98–111)
CO2: 27 MEQ/L (ref 23–33)
CREAT SERPL-MCNC: 0.6 MG/DL (ref 0.4–1.2)
GFR SERPL CREATININE-BSD FRML MDRD: > 90 ML/MIN/1.73M2
GLUCOSE BLD-MCNC: 129 MG/DL (ref 70–108)
POTASSIUM SERPL-SCNC: 4.6 MEQ/L (ref 3.5–5.2)
SODIUM BLD-SCNC: 122 MEQ/L (ref 135–145)

## 2020-11-22 PROCEDURE — 2580000003 HC RX 258: Performed by: STUDENT IN AN ORGANIZED HEALTH CARE EDUCATION/TRAINING PROGRAM

## 2020-11-22 PROCEDURE — 80048 BASIC METABOLIC PNL TOTAL CA: CPT

## 2020-11-22 PROCEDURE — 97161 PT EVAL LOW COMPLEX 20 MIN: CPT

## 2020-11-22 PROCEDURE — 1200000000 HC SEMI PRIVATE

## 2020-11-22 PROCEDURE — 94761 N-INVAS EAR/PLS OXIMETRY MLT: CPT

## 2020-11-22 PROCEDURE — 6370000000 HC RX 637 (ALT 250 FOR IP): Performed by: STUDENT IN AN ORGANIZED HEALTH CARE EDUCATION/TRAINING PROGRAM

## 2020-11-22 PROCEDURE — 6370000000 HC RX 637 (ALT 250 FOR IP): Performed by: NURSE PRACTITIONER

## 2020-11-22 PROCEDURE — 99232 SBSQ HOSP IP/OBS MODERATE 35: CPT | Performed by: OPHTHALMOLOGY

## 2020-11-22 PROCEDURE — 97110 THERAPEUTIC EXERCISES: CPT

## 2020-11-22 PROCEDURE — 97535 SELF CARE MNGMENT TRAINING: CPT

## 2020-11-22 PROCEDURE — 36415 COLL VENOUS BLD VENIPUNCTURE: CPT

## 2020-11-22 RX ADMIN — GABAPENTIN 400 MG: 400 CAPSULE ORAL at 08:03

## 2020-11-22 RX ADMIN — GABAPENTIN 400 MG: 400 CAPSULE ORAL at 14:15

## 2020-11-22 RX ADMIN — NALOXEGOL OXALATE 12.5 MG: 12.5 TABLET, FILM COATED ORAL at 08:03

## 2020-11-22 RX ADMIN — DOCUSATE SODIUM 100 MG: 100 CAPSULE, LIQUID FILLED ORAL at 08:03

## 2020-11-22 RX ADMIN — MORPHINE SULFATE 30 MG: 30 TABLET, FILM COATED, EXTENDED RELEASE ORAL at 16:14

## 2020-11-22 RX ADMIN — MORPHINE SULFATE 30 MG: 30 TABLET, FILM COATED, EXTENDED RELEASE ORAL at 02:33

## 2020-11-22 RX ADMIN — MORPHINE SULFATE 20 MG: 10 SOLUTION ORAL at 20:30

## 2020-11-22 RX ADMIN — TAMSULOSIN HYDROCHLORIDE 0.4 MG: 0.4 CAPSULE ORAL at 08:03

## 2020-11-22 RX ADMIN — MORPHINE SULFATE 20 MG: 10 SOLUTION ORAL at 11:22

## 2020-11-22 RX ADMIN — SODIUM CHLORIDE, PRESERVATIVE FREE 10 ML: 5 INJECTION INTRAVENOUS at 20:25

## 2020-11-22 RX ADMIN — SODIUM CHLORIDE, PRESERVATIVE FREE 10 ML: 5 INJECTION INTRAVENOUS at 08:05

## 2020-11-22 RX ADMIN — GABAPENTIN 400 MG: 400 CAPSULE ORAL at 20:21

## 2020-11-22 RX ADMIN — DOCUSATE SODIUM 100 MG: 100 CAPSULE, LIQUID FILLED ORAL at 20:21

## 2020-11-22 RX ADMIN — MORPHINE SULFATE 30 MG: 30 TABLET, FILM COATED, EXTENDED RELEASE ORAL at 08:04

## 2020-11-22 ASSESSMENT — PAIN SCALES - GENERAL
PAINLEVEL_OUTOF10: 6
PAINLEVEL_OUTOF10: 5
PAINLEVEL_OUTOF10: 7
PAINLEVEL_OUTOF10: 7
PAINLEVEL_OUTOF10: 8
PAINLEVEL_OUTOF10: 7

## 2020-11-22 NOTE — PROGRESS NOTES
[address] [facility]  Occupational Therapy  Daily Note  Time:   Time In: 2774  Timed Code Treatment Minutes: 23 Minutes          Date: 2020  Patient Name: Bozena Antunez,   Gender: male      Room: [facility]10/010-A  MRN: [id number]  : 1955  (76 y.o.)  Referring Practitioner: Dr. Piyush Shah MD  Diagnosis: Pancreatic Cancer  Additional Pertinent Hx: Pt medical history of prostate cancer (2015 s/p chemoradiation) and recent diagnosis of pancreatic cancer. He initially experienced abdominal pain several months ago and upon evaluation with a CT abdomen pelvis on 10/21/2020 revealed a necrotic mass involving the neck, proximal body of the pancreas. There is also some mass involving the stomach concerning for malignancy. A biopsy done on 10/27/20 revealed metastatic pancreatic cancer. He was evaluated by the oncology team on 11/10/2020 in which he underwent his first treatment of chemotherapy. In addition, there were plans to admit the patient directly for pain management however due to the ED being overcrowded he was unable to be admitted. In addition, they increased his long-acting morphine to 30/30/15 mg in 8-hour intervals with 15 mg morphine immediate release for breakthrough pain. He states he has been taking about 4 of the 50 mg immediate release tablets per day. He states that despite this increase his pain is still excruciating 10/10. He is unable to even sit upright due to the pain being so severe. The worst of his pain is on the right side of the abdomen but will radiate through both of the lower parts of his abdomen and up the entire abdomen. He also endorses severe pain radiating around to his back. He states that he has very poor appetite with associated nausea/vomiting. In addition, he notes that he has been able to pass bowel movements for a few days and has been only having very loose stools.     Restrictions/Precautions:  Restrictions/Precautions: General breaks as needed to increase his endurance for ease of doing community mobility and working in the yard. Short term goal 2: Pt will complete BUE moderate/high resistance HEP while following any handout needed and taking short rest breaks to increase his strength and endurance for ease of doing ADLs and yardwork. Short term goal 3: Pt will complete BADLs with supervision while using any AE needed to increase his safety and help with pain management for returning to being independent at home. Short term goal 4: Pt will complete standing ADLs for over 8 minute duration with cues for back protection to increase his endurance and help manage his pain for ease of showering or dressing. Following session, patient left in safe position with all fall risk precautions in place.     Cr Carter, OTR/L [id number]

## 2020-11-22 NOTE — PROGRESS NOTES
[address] [address]  INPATIENT PHYSICAL THERAPY  EVALUATION  [facility] ONC MED 5K - 5K-10010-A    Time In: 8005  Time Out: 5197  Timed Code Treatment Minutes: 8 Minutes  Minutes: 23          Date: 2020  Patient Name: Gregoria Moya,  Gender:  male        MRN: [id number]  : 1955  (76 y.o.)      Referring Practitioner: Rosy Berman MD  Diagnosis: Pancreatic Cancer  Additional Pertinent Hx: Pt medical history of prostate cancer (2015 s/p chemoradiation) and recent diagnosis of pancreatic cancer. He initially experienced abdominal pain several months ago and upon evaluation with a CT abdomen pelvis on 10/21/2020 revealed a necrotic mass involving the neck, proximal body of the pancreas. There is also some mass involving the stomach concerning for malignancy. A biopsy done on 10/27/20 revealed metastatic pancreatic cancer. He was evaluated by the oncology team on 11/10/2020 in which he underwent his first treatment of chemotherapy. In addition, there were plans to admit the patient directly for pain management however due to the ED being overcrowded he was unable to be admitted. In addition, they increased his long-acting morphine to 30/30/15 mg in 8-hour intervals with 15 mg morphine immediate release for breakthrough pain. He states he has been taking about 4 of the 50 mg immediate release tablets per day. He states that despite this increase his pain is still excruciating 10/10. He is unable to even sit upright due to the pain being so severe. The worst of his pain is on the right side of the abdomen but will radiate through both of the lower parts of his abdomen and up the entire abdomen. He also endorses severe pain radiating around to his back. He states that he has very poor appetite with associated nausea/vomiting. In addition, he notes that he has been able to pass bowel movements for a few days and has been only having very loose stools. Restrictions/Precautions:  Restrictions/Precautions: General Precautions    Subjective:  Chart Reviewed: Yes  Patient assessed for rehabilitation services?: Yes  Subjective: RN approved session. Pt in bed and agreed to session. General:  Overall Orientation Status: Within Normal Limits    Vision: Impaired  Vision Exceptions: Wears glasses for reading    Hearing: Within functional limits         Pain: 6/10: low back to sides    Social/Functional History:    Lives With: Friend(s)  Type of Home: House  Home Layout: Two level, Able to Live on Main level with bedroom/bathroom  Home Access: Stairs to enter with rails  Entrance Stairs - Number of Steps: 4-5 steps     Bathroom Shower/Tub: Tub/Shower unit  Bathroom Toilet: Standard  Bathroom Accessibility: Accessible    Receives Help From: Friend(s)  ADL Assistance: Independent  Homemaking Assistance: Needs assistance  Homemaking Responsibilities: Yes  Ambulation Assistance: Independent  Transfer Assistance: Independent    Active : Yes  Occupation: Other(comment)(On medical leave)  Type of occupation: Maintenance  Leisure & Hobbies: Outdoor activity- hunting and fishing when able  Additional Comments: Pt was working until Oct. 20.  He has been having difficulty with fatigue and some loss of weight as he has also been having nausea along with the abdominal pain. NO AD used for ambulating.     OBJECTIVE:  Range of Motion:  Right Lower Extremity: WFL  Left Lower Extremity: WFL    Strength:  Right Lower Extremity: Impaired - grossly 4-/5  Left Lower Extremity: Impaired - grossly 4-/5    Balance:  Static Sitting Balance:  Modified Independent  Dynamic Sitting Balance: Modified Independent  Static Standing Balance: Supervision  Dynamic Standing Balance: Stand By Assistance    Bed Mobility:  Rolling to Left: Modified Independent   Rolling to Right: Modified Independent   Supine to Sit: Modified Independent  Sit to Supine: Modified Independent     Transfers:  Sit to Stand: Stand By Assistance  Stand to Sit:Stand By Assistance    Ambulation:  Supervision, Stand By Assistance  Distance: 10 feet  Surface: Level Tile  Device:No Device  Gait Deviations: Forward Flexed Posture, Slow Capri and Decreased Arm Swing    Functional Outcome Measures: Completed  AM-PAC Inpatient Mobility without Stair Climbing Raw Score : 17  AM-PAC Inpatient without Stair Climbing T-Scale Score : 48.47    ASSESSMENT:  Activity Tolerance:  Patient tolerance of  treatment: good. Required bathroom after walking in hallway      Treatment Initiated: Treatment and education initiated within context of evaluation. Evaluation time included review of current medical information, gathering information related to past medical, social and functional history, completion of standardized testing, formal and informal observation of tasks, assessment of data and development of plan of care and goals. Treatment time included skilled education and facilitation of tasks to increase safety and independence with functional mobility for improved independence and quality of life. Ambulated 50 feet in hallway with stand by assistance for safety to help improve strength and stamina. Assessment: Body structures, Functions, Activity limitations: Decreased functional mobility , Increased pain, Decreased ADL status, Decreased strength, Decreased endurance  Assessment: Pt requires skilled PT to address above impairments for optimizing independent, safe function in his home.   Prognosis: Good    REQUIRES PT FOLLOW UP: Yes    Discharge Recommendations:  Discharge Recommendations: Continue to assess pending progress    Patient Education:  PT Education: Goals, General Safety, PT Role, Plan of Care    Equipment Recommendations:  Equipment Needed: No    Plan:  Times per week: 5x GM  Times per day: Daily  Current Treatment Recommendations: Strengthening, Balance Training, Endurance Training, Functional Mobility Training, Transfer Training, Stair training, Gait Training    Goals:  Patient goals : Get pain under controll  Short term goals  Time Frame for Short term goals: by discharge  Short term goal 1: Pt to be Mod I with bed mobility for safety in the home. Short term goal 2: Pt to demo Mod I with transfers for safety in the home. Short term goal 3: Pt to demo Mod I with gait >100 feet for safety in the home. Short term goal 4: Pt to be Mod I up/down 4 steps with HR for safety in/out of home. Long term goals  Time Frame for Long term goals : NA due to short ELOS    Following session, patient left in safe position with all fall risk precautions in place.     Greg Gordillo, DPT, [place] [id number] 11/22/2020

## 2020-11-22 NOTE — PROGRESS NOTES
Hospitalist Progress Note    Patient:  Eulalio Guillaume      Unit/Bed:5K-10/010-A    YOB: 1955    MRN: [id number]       Acct: [de-identified]     PCP: Raquel Mendoza MD    Date of Admission: 11/19/2020  ASSESSMENT and PLAN:          Active Hospital Problems     Diagnosis Date Noted    Pancreatic cancer [facility]) [C25.9] 11/19/2020         Severe uncontrollable pain associated with metastatic pancreatic cancer  -direct admission   -Meds adjusted by pain management      Pancreatic cancer with mets to liver  -Follows with Dr. Milagro Fraga as outpatient, consulted  -Palliative care consulted for CODE STATUS and goals of care discussion  -Spiritual care consult as well to discuss POA and living will     Opiate-induced constipation  -Is on significant amounts of morphine at home as above  - KUB: nonspecific gas pattern  -Continue Colace  - Movantik  -Maalox as needed  -BM 11/22/20     Hypotension  -Dehydration on admission  -Better        Poor oral intake, malnutrition  -Secondary to metastatic cancer and chronic pain  -Dietitian consult     Physical deconditioning  -PT/OT     History of prostate cancer  -Per history     DEE  -Continue CPAP if he is currently using     Hyperlipidemia  -Does not appear to currently be on a statin however considering his cancer prognosis, no indications to start a statin     Osteoarthritis  -Pain management as needed     Anxiety  -Noted per chart review/history  -Does not appear to be any home meds to manage anxiety however will continue to assess needs and may start PRN medication for anxiety if indicated     Tobacco abuse  -Per chart review, 1 pack/day smoker x 50 years  -Nicotine patches     Chief Complaint: Severe abdominal pain     History Of Present Illness: This is a 58-year-old male with a history of prostate cancer, history of pancreatic cancer with mets to liver, who has been directly admitted to [address] [place] [address] from pain clinic.   Patient was being seen by Dr. Jena Jerez earlier today in outpatient clinic and was complaining of severe 10 out of 10 abdominal pain not controlled with daily extended release and immediate release morphine. He was also recently seen by Dr. Tal Joseph who, per documentation, had plan to send patient to the hospital however ED was significantly overwhelmed at that time. Oncology team increased his morphine dosage at that appointment and referred the patient to Dr. Joe Grimm.     Report obtained today from Dr. Joe Grimm: Patient apparently came into the clinic and was doubled over in pain, frequently yelling out due to pain. Decision made to direct admit him for adequate pain control. Subjective (past 24 hours)  Still with pain but better  Had BM today    Diet:  DIET GENERAL;  Dietary Nutrition Supplements: Standard High Calorie Oral Supplement      Medications:  Scheduled Meds:   sodium chloride flush  10 mL Intravenous 2 times per day    enoxaparin  40 mg Subcutaneous Daily    docusate sodium  100 mg Oral BID    gabapentin  400 mg Oral TID    tamsulosin  0.4 mg Oral Daily    naloxegol  12.5 mg Oral QAM    lidocaine  3 patch Transdermal Daily    morphine  30 mg Oral Q8H     Continuous Infusions:  PRN Meds:sodium chloride flush, polyethylene glycol, promethazine **OR** ondansetron, acetaminophen **OR** [DISCONTINUED] acetaminophen, HYDROmorphone, morphine    Objective:    Review of Labs and Diagnostic Testing:  Labs:     Recent Labs     11/19/20  1609 11/20/20  0416   WBC 11.6* 13.6*   HGB 11.0* 11.0*   HCT 31.5* 31.6*   PLT 85* 83*     Recent Labs     11/19/20  1609 11/20/20  0416   * 126*   K 4.6 4.7   CL 86* 88*   CO2 28 26   BUN 28* 27*   CREATININE 0.6 0.6   CALCIUM 9.0 8.8   PHOS 3.7  --      Recent Labs     11/19/20  1609   AST 65*   *   BILITOT 5.0*   ALKPHOS 482*     No results found for: AMYLASE  No results for input(s): INR in the last 72 hours. No results for input(s): TROPONINT in the last 72 hours.   No results for input(s): BNP in the last 72 hours. No results for input(s): LACTA in the last 72 hours. No results found for: PROCAL  No results found for: LABA1C    Urinalysis:   No results found for: Gill Weiss, BACTERIA, RBCUA, BLOODU, [address], [address]  Radiology:   Reviewed: see report for details  CXR: I have reviewed the CXR  EKG:  No acute changes:  XR ABDOMEN (KUB) (SINGLE AP VIEW)   Final Result   Nonspecific gas pattern. **This report has been created using voice recognition software. It may contain minor errors which are inherent in voice recognition technology. **      Final report electronically signed by Dr. Colin Sewell on 11/19/2020 4:51 PM          Physical Exam:  BP (!) 118/58   Pulse 85   Temp 98.2 °F (36.8 °C) (Oral)   Resp 16   Ht 5' 10\" (1.778 m)   Wt 166 lb (75.3 kg)   SpO2 97%   BMI 23.82 kg/m²   General appearance - alert, well appearing, and in no distress   Chest - clear to auscultation, no wheezes, rales or rhonchi, symmetric air entry   Distant Breath Sounds: No   Heart - normal rate, regular rhythm, normal S1, S2, no murmurs, rubs, clicks or gallops   Abdomen - soft,   tender, nondistended, no masses or organomegaly   Obese: No; Protuberant: Bowel sounds heard  Neurological - A&O 3 , normal speech, no focal findings or movement disorder noted   Musculoskeletal - no joint tenderness, deformity or swelling   Extremities - peripheral pulses normal, no pedal edema, no clubbing or cyanosis  24 hour intake/output:    Intake/Output Summary (Last 24 hours) at 11/22/2020 6281  Last data filed at 11/21/2020 2045  Gross per 24 hour   Intake 690 ml   Output --   Net 690 ml     Assessment/Plan:    Active Problems:    Pancreatic cancer ([address]    Cancer associated pain    Generalized abdominal pain    Therapeutic opioid induced constipation    Severe malnutrition ([address]  Resolved Problems:    * No resolved hospital problems.  *        PT/OT Eval Status: consulted  DVT prophylaxis: [x] Lovenox [] SCDs                                 [] SQ Heparin                                 [] Encourage ambulation           [] Already on Anticoagulation  Code Status: Full Code    Yimi Batista MD on 11/22/2020 at 8:26 AM  Rounding Hospitalist

## 2020-11-23 PROBLEM — E87.1 HYPONATREMIA: Status: ACTIVE | Noted: 2020-11-23

## 2020-11-23 LAB
ANION GAP SERPL CALCULATED.3IONS-SCNC: 9 MEQ/L (ref 8–16)
BUN BLDV-MCNC: 14 MG/DL (ref 7–22)
CALCIUM SERPL-MCNC: 8.3 MG/DL (ref 8.5–10.5)
CHLORIDE BLD-SCNC: 85 MEQ/L (ref 98–111)
CO2: 27 MEQ/L (ref 23–33)
CORTISOL COLLECTION INFO: NORMAL
CORTISOL: 18.1 UG/DL
CREAT SERPL-MCNC: 0.6 MG/DL (ref 0.4–1.2)
GFR SERPL CREATININE-BSD FRML MDRD: > 90 ML/MIN/1.73M2
GLUCOSE BLD-MCNC: 124 MG/DL (ref 70–108)
MAGNESIUM: 2 MG/DL (ref 1.6–2.4)
OSMOLALITY URINE: 551 MOSMOL/KG (ref 250–750)
OSMOLALITY: 260 MOSMOL/KG (ref 275–295)
POTASSIUM SERPL-SCNC: 5.1 MEQ/L (ref 3.5–5.2)
SODIUM BLD-SCNC: 121 MEQ/L (ref 135–145)
SODIUM BLD-SCNC: 124 MEQ/L (ref 135–145)
SODIUM URINE: < 20 MEQ/L
T4 FREE: 1.76 NG/DL (ref 0.93–1.76)
URIC ACID: 3.1 MG/DL (ref 3.7–7)

## 2020-11-23 PROCEDURE — 94760 N-INVAS EAR/PLS OXIMETRY 1: CPT

## 2020-11-23 PROCEDURE — 82533 TOTAL CORTISOL: CPT

## 2020-11-23 PROCEDURE — 1200000000 HC SEMI PRIVATE

## 2020-11-23 PROCEDURE — 80048 BASIC METABOLIC PNL TOTAL CA: CPT

## 2020-11-23 PROCEDURE — 99254 IP/OBS CNSLTJ NEW/EST MOD 60: CPT | Performed by: INTERNAL MEDICINE

## 2020-11-23 PROCEDURE — 83935 ASSAY OF URINE OSMOLALITY: CPT

## 2020-11-23 PROCEDURE — 99223 1ST HOSP IP/OBS HIGH 75: CPT | Performed by: NURSE PRACTITIONER

## 2020-11-23 PROCEDURE — 2580000003 HC RX 258: Performed by: STUDENT IN AN ORGANIZED HEALTH CARE EDUCATION/TRAINING PROGRAM

## 2020-11-23 PROCEDURE — 99233 SBSQ HOSP IP/OBS HIGH 50: CPT | Performed by: INTERNAL MEDICINE

## 2020-11-23 PROCEDURE — 84295 ASSAY OF SERUM SODIUM: CPT

## 2020-11-23 PROCEDURE — 83930 ASSAY OF BLOOD OSMOLALITY: CPT

## 2020-11-23 PROCEDURE — 6370000000 HC RX 637 (ALT 250 FOR IP): Performed by: STUDENT IN AN ORGANIZED HEALTH CARE EDUCATION/TRAINING PROGRAM

## 2020-11-23 PROCEDURE — 99232 SBSQ HOSP IP/OBS MODERATE 35: CPT | Performed by: NURSE PRACTITIONER

## 2020-11-23 PROCEDURE — 2580000003 HC RX 258: Performed by: INTERNAL MEDICINE

## 2020-11-23 PROCEDURE — 84439 ASSAY OF FREE THYROXINE: CPT

## 2020-11-23 PROCEDURE — 6370000000 HC RX 637 (ALT 250 FOR IP): Performed by: NURSE PRACTITIONER

## 2020-11-23 PROCEDURE — 84300 ASSAY OF URINE SODIUM: CPT

## 2020-11-23 PROCEDURE — 84550 ASSAY OF BLOOD/URIC ACID: CPT

## 2020-11-23 PROCEDURE — 36415 COLL VENOUS BLD VENIPUNCTURE: CPT

## 2020-11-23 PROCEDURE — 83735 ASSAY OF MAGNESIUM: CPT

## 2020-11-23 RX ORDER — LIDOCAINE 4 G/G
3 PATCH TOPICAL DAILY
Qty: 30 PATCH | Refills: 0 | Status: SHIPPED | OUTPATIENT
Start: 2020-11-24 | End: 2020-12-03

## 2020-11-23 RX ORDER — MORPHINE SULFATE 15 MG/1
15 TABLET ORAL EVERY 4 HOURS PRN
Qty: 21 TABLET | Refills: 0 | Status: SHIPPED | OUTPATIENT
Start: 2020-11-23 | End: 2020-11-27

## 2020-11-23 RX ORDER — ONDANSETRON 4 MG/1
4 TABLET, FILM COATED ORAL DAILY
Qty: 21 TABLET | Refills: 0 | Status: SHIPPED | OUTPATIENT
Start: 2020-11-23 | End: 2020-11-30

## 2020-11-23 RX ORDER — SODIUM CHLORIDE 9 MG/ML
INJECTION, SOLUTION INTRAVENOUS CONTINUOUS
Status: DISCONTINUED | OUTPATIENT
Start: 2020-11-23 | End: 2020-11-24

## 2020-11-23 RX ORDER — NICOTINE 21 MG/24HR
1 PATCH, TRANSDERMAL 24 HOURS TRANSDERMAL DAILY
Status: DISCONTINUED | OUTPATIENT
Start: 2020-11-23 | End: 2020-11-26 | Stop reason: HOSPADM

## 2020-11-23 RX ADMIN — GABAPENTIN 400 MG: 400 CAPSULE ORAL at 10:10

## 2020-11-23 RX ADMIN — TAMSULOSIN HYDROCHLORIDE 0.4 MG: 0.4 CAPSULE ORAL at 10:10

## 2020-11-23 RX ADMIN — NALOXEGOL OXALATE 12.5 MG: 12.5 TABLET, FILM COATED ORAL at 10:10

## 2020-11-23 RX ADMIN — DOCUSATE SODIUM 100 MG: 100 CAPSULE, LIQUID FILLED ORAL at 21:22

## 2020-11-23 RX ADMIN — MORPHINE SULFATE 20 MG: 10 SOLUTION ORAL at 04:11

## 2020-11-23 RX ADMIN — GABAPENTIN 400 MG: 400 CAPSULE ORAL at 21:22

## 2020-11-23 RX ADMIN — DOCUSATE SODIUM 100 MG: 100 CAPSULE, LIQUID FILLED ORAL at 10:09

## 2020-11-23 RX ADMIN — MORPHINE SULFATE 30 MG: 30 TABLET, FILM COATED, EXTENDED RELEASE ORAL at 17:15

## 2020-11-23 RX ADMIN — MORPHINE SULFATE 30 MG: 30 TABLET, FILM COATED, EXTENDED RELEASE ORAL at 01:55

## 2020-11-23 RX ADMIN — SODIUM CHLORIDE: 9 INJECTION, SOLUTION INTRAVENOUS at 14:50

## 2020-11-23 RX ADMIN — MORPHINE SULFATE 30 MG: 30 TABLET, FILM COATED, EXTENDED RELEASE ORAL at 10:09

## 2020-11-23 RX ADMIN — MORPHINE SULFATE 20 MG: 10 SOLUTION ORAL at 21:22

## 2020-11-23 RX ADMIN — SODIUM CHLORIDE, PRESERVATIVE FREE 10 ML: 5 INJECTION INTRAVENOUS at 10:12

## 2020-11-23 RX ADMIN — POLYETHYLENE GLYCOL 3350 17 G: 17 POWDER, FOR SOLUTION ORAL at 21:22

## 2020-11-23 RX ADMIN — GABAPENTIN 400 MG: 400 CAPSULE ORAL at 15:33

## 2020-11-23 ASSESSMENT — ENCOUNTER SYMPTOMS
EYES NEGATIVE: 1
EYE PAIN: 0
BACK PAIN: 1
DIARRHEA: 0
NAUSEA: 0
VOMITING: 0
COUGH: 0
ABDOMINAL PAIN: 1
SHORTNESS OF BREATH: 0
SORE THROAT: 0

## 2020-11-23 ASSESSMENT — PAIN SCALES - GENERAL
PAINLEVEL_OUTOF10: 6
PAINLEVEL_OUTOF10: 0
PAINLEVEL_OUTOF10: 7
PAINLEVEL_OUTOF10: 7
PAINLEVEL_OUTOF10: 6
PAINLEVEL_OUTOF10: 5
PAINLEVEL_OUTOF10: 5
PAINLEVEL_OUTOF10: 6

## 2020-11-23 NOTE — CONSULTS
 Not on file   Social Needs    Financial resource strain: Not on file    Food insecurity     Worry: Not on file     Inability: Not on file    Transportation needs     Medical: Not on file     Non-medical: Not on file   Tobacco Use    Smoking status: Current Every Day Smoker     Packs/day: 1.00     Years: 50.00     Pack years: 50.00     Types: Cigarettes     Start date: 5    Smokeless tobacco: Never Used   Substance and Sexual Activity    Alcohol use: Not Currently    Drug use: Not Currently    Sexual activity: Not Currently     Partners: Female   Lifestyle    Physical activity     Days per week: Not on file     Minutes per session: Not on file    Stress: Not on file   Relationships    Social connections     Talks on phone: Not on file     Gets together: Not on file     Attends Religion service: Not on file     Active member of club or organization: Not on file     Attends meetings of clubs or organizations: Not on file     Relationship status: Not on file    Intimate partner violence     Fear of current or ex partner: Not on file     Emotionally abused: Not on file     Physically abused: Not on file     Forced sexual activity: Not on file   Other Topics Concern    Not on file   Social History Narrative    Not on file     Family History   Problem Relation Age of Onset    Cancer Mother     Depression Mother     Heart Disease Mother      Medications & Allergies      Prior to Admission medications    Medication Sig Start Date End Date Taking? Authorizing Provider   morphine (MSIR) 15 MG tablet Take 1 tablet by mouth every 4 hours as needed for Pain for up to 7 days. 11/23/20 11/30/20 Yes Shefali Florence MD   Morphine Sulfate ER 30 MG T12A Take 30 mg by mouth every 8 hours for 30 days.  11/23/20 12/23/20 Yes Shefali Florence MD   ondansetron (ZOFRAN) 4 MG tablet Take 1 tablet by mouth daily for 7 days Take 4 mg by mouth daily 11/23/20 11/30/20 Yes Shefali Florence MD   naloxegol (MOVANTIK) 12.5 MG TABS tablet Take 1 tablet by mouth every morning 11/24/20  Yes Ksenia Carbone MD   lidocaine 4 % external patch Place 3 patches onto the skin daily 11/24/20  Yes Ksenia Carbone MD   flurandrenolide (CORDRAN) 0.05 % CREA Apply 1 Tube topically 2 times daily for 7 days 11/18/20 11/25/20  Gwen Watts MD   omeprazole (PRILOSEC) 20 MG delayed release capsule Take 1 capsule by mouth daily 11/18/20   Gwen Watts MD   gabapentin (NEURONTIN) 400 MG capsule Take 400 mg by mouth 3 times daily. Historical Provider, MD   ibuprofen (ADVIL;MOTRIN) 200 MG tablet Take 600 mg by mouth 4 times daily as needed for Pain    Historical Provider, MD   polyethylene glycol (GLYCOLAX) 17 GM/SCOOP powder Take 17 g by mouth daily    Historical Provider, MD   docusate sodium (COLACE) 100 MG capsule Take 100 mg by mouth 2 times daily    Historical Provider, MD   tamsulosin (FLOMAX) 0.4 MG capsule Take 1 capsule by mouth daily 11/10/20   Gwen Watts MD     Allergies: Anaprox [naproxen] and Vicodin [hydrocodone-acetaminophen]  IP meds : Scheduled Meds:   nicotine  1 patch Transdermal Daily    sodium chloride flush  10 mL Intravenous 2 times per day    enoxaparin  40 mg Subcutaneous Daily    docusate sodium  100 mg Oral BID    gabapentin  400 mg Oral TID    tamsulosin  0.4 mg Oral Daily    naloxegol  12.5 mg Oral QAM    lidocaine  3 patch Transdermal Daily    morphine  30 mg Oral Q8H     Continuous Infusions:   sodium chloride 75 mL/hr at 11/23/20 1450     Review of Systems Physical Exam   Review of Systems   Constitutional: Positive for fatigue. Negative for fever. HENT: Negative for ear pain and sore throat. Eyes: Negative. Negative for pain. Respiratory: Negative for cough and shortness of breath. Cardiovascular: Positive for leg swelling. Negative for chest pain. Gastrointestinal: Positive for abdominal pain. Negative for diarrhea, nausea and vomiting. Genitourinary: Negative for dysuria, frequency and hematuria. Musculoskeletal: Positive for back pain. Negative for neck pain. Skin: Negative for rash and wound. Neurological: Negative for dizziness and headaches. Psychiatric/Behavioral: Negative for agitation and confusion. Physical Exam  Vitals signs reviewed. Constitutional:       General: He is not in acute distress. Appearance: Normal appearance. He is not diaphoretic. HENT:      Head: Normocephalic and atraumatic. Right Ear: External ear normal.      Left Ear: External ear normal.      Nose: Nose normal.   Eyes:      General: No scleral icterus. Right eye: No discharge. Left eye: No discharge. Conjunctiva/sclera: Conjunctivae normal.   Neck:      Musculoskeletal: Normal range of motion and neck supple. Thyroid: No thyromegaly. Vascular: No JVD. Cardiovascular:      Rate and Rhythm: Normal rate and regular rhythm. Heart sounds: Normal heart sounds. No murmur. Pulmonary:      Effort: Pulmonary effort is normal. No respiratory distress. Breath sounds: Normal breath sounds. No stridor. Chest:      Chest wall: No tenderness. Abdominal:      General: Bowel sounds are normal.      Palpations: Abdomen is soft. Tenderness: There is no abdominal tenderness. Musculoskeletal:         General: No tenderness. Skin:     General: Skin is warm and dry. Findings: No erythema or rash. Neurological:      Mental Status: He is alert and oriented to person, place, and time. Vitals:    11/23/20 1401   BP: 120/60   Pulse: 80   Resp: 16   Temp: 98.2 °F (36.8 °C)   SpO2: 95%     Labs, Radiology and Tests     No results for input(s): WBC, RBC, HGB, HCT, MCV, MCH, MCHC, RDW, PLT in the last 72 hours.   Recent Labs     11/22/20  1621 11/23/20  0517   * 121*   K 4.6 5.1   CL 86* 85*   CO2 27 27   BUN 14 14   CREATININE 0.6 0.6   CALCIUM 8.5 8.3*       Radiology : X-ray KUB shows nonspecific gas pattern    Old lab data shows earlier this month on 11/13 patient sodium was 131     Assessment    1 Renal -renal function appears to be stable at baseline  2 Hyponatremia-as per the urine lites patient is intravascular volume depleted, urine sodium is less than 20 and urine osmolarity is high  ? Does have bilateral lower extremity edema be due to hypervolemic hyponatremia  ? Stat sodium level low currently on IV fluids okay to continue  ? Based on the sodium level I might stop the IV fluids and consider salt tablets/diuretics    3 Blood pressure running well no history of essential hypertension  4 Uncontrolled pain due to pancreatic cancer on narcotics  5 Metastatic pancreatic cancer  6 Meds reviewed and discussed with him in detail      **This report has been created using voice recognition software. It maycontain minor  errors which are inherent in voice recognition technology. **    Cory Sandra M.D  [facility].

## 2020-11-23 NOTE — PROGRESS NOTES
[facility]  PHYSICAL THERAPY MISSED TREATMENT NOTE  ACUTE CARE  [facility] ONC MED 5K              Missed Treatment  RN requested to wait to see this pt. As house supervisor was to speak to pt. Regarding hospital smoking policy. Will check back later as able.

## 2020-11-23 NOTE — PROGRESS NOTES
Assessment and Plan:        1. Uncontrolled pain due to pancreatic cancer- pt says much improved. Now 6/10  2. Hyponatremia- sodium continues to drop- prior urine studies suggested sodium depletion; will hold discharge. Monitor. He has ascites which may be a factor. CC:  Abdominal pain  HPI: pt with pancreas cancer, intractable pain, admitted for pain control. He is better. His sodium, however, has been progressively dropping. He has swelling in his legs. ROS (12 point review of systems completed. Pertinent positives noted. Otherwise ROS is negative) : appetite better.   constipation  PMH:  Per HPI  SHX:  Lives with wife  FHX: cancer, heart  Allergies: See above    Medications:       nicotine  1 patch Transdermal Daily    sodium chloride flush  10 mL Intravenous 2 times per day    enoxaparin  40 mg Subcutaneous Daily    docusate sodium  100 mg Oral BID    gabapentin  400 mg Oral TID    tamsulosin  0.4 mg Oral Daily    naloxegol  12.5 mg Oral QAM    lidocaine  3 patch Transdermal Daily    morphine  30 mg Oral Q8H       Vital Signs:   BP (!) 118/58   Pulse 84   Temp 98.2 °F (36.8 °C) (Oral)   Resp 16   Ht 5' 10\" (1.778 m)   Wt 164 lb (74.4 kg)   SpO2 94%   BMI 23.53 kg/m²      Intake/Output Summary (Last 24 hours) at 11/23/2020 1212  Last data filed at 11/22/2020 2124  Gross per 24 hour   Intake 420 ml   Output --   Net 420 ml        Physical Examination: General appearance - chronically ill appearing  Mental status - alert, oriented to person, place, and time  Neck - supple, no significant adenopathy, no JVD, or carotid bruits  Chest - not examined  Heart - no JVD  Abdomen - soft, nontender, nondistended, no masses or organomegaly  Neurological - alert, oriented, normal speech, no focal findings or movement disorder noted  Musculoskeletal - no muscular tenderness noted  Extremities - marked pitting edema  Skin - normal coloration and turgor, no rashes, no suspicious skin lesions

## 2020-11-23 NOTE — CONSULTS
[facility]    Patient Soco Higgins   MRN -  [id number]   Jennifer # - [de-identified]   - 1955      Date of Admission -  2020  3:11 PM  Date of evaluation -  2020  Room - -10/010-A   Hospital Day - 4  Consulting - Yamileth Tavarez MD Primary Care Physician - Abdon Prabhakar MD     Inpatient consult to Oncology  Consult performed by: LISA Pantoja - CNP  Consult ordered by: Michelle Medrano MD        Reason for Consult    Known pt to our office, on active chemotherapy regimen  [address] Problems    Diagnosis Date Noted    Hyponatremia [E87.1] 2020    Severe malnutrition ([address] [E43] 2020     Class: Acute    Pancreatic cancer ([address] [C25.9] 2020    Cancer associated pain [G89.3]     Generalized abdominal pain [R10.84]     Therapeutic opioid induced constipation [K59.03, Nick Beady      HPI   Stephane Beauchamp is a 76 y.o. male admitted as a direct admit from the pain clinic on 2020 for evaluation of extreme, intractable abdominal pain secondary to metastatic pancreatic cancer. Pt on ER & intermediate release morphine dosages without much relief. KUB (+) gas distention. Pain management discussing possible celiac plexus block. 2020 - Pt resting in bed, friend at bedside. Telesitter in place. Pt states abdominal pain \"better\", \"under control now\". Pt reports occasional nausea, but \"I have medication for this that helps\". Pt reports chronic constipation that is relieved with Colace BID & Miralax daily. Pt has not been taking his Miralax, advised pt to resume typical regimen that works for him. Pt reports BLE edema, +1. Legs wrapped bilaterally with ace wraps. Pt denies H/A, dizziness, cough, SOB, CP, vomiting, diarrhea, N/T, mouth sores, fever/chills, s/s infections.       Oncology History    Pancreatic cancer with metastasis to liver  Meds    Current Medications    nicotine  1 patch Transdermal Daily    sodium chloride flush  10 mL Intravenous 2 times per day    enoxaparin  40 mg Subcutaneous Daily    docusate sodium  100 mg Oral BID    gabapentin  400 mg Oral TID    tamsulosin  0.4 mg Oral Daily    naloxegol  12.5 mg Oral QAM    lidocaine  3 patch Transdermal Daily    morphine  30 mg Oral Q8H     sodium chloride flush, polyethylene glycol, promethazine **OR** ondansetron, acetaminophen **OR** [DISCONTINUED] acetaminophen, morphine  IV Drips/Infusions   sodium chloride 75 mL/hr at 11/23/20 1450     Past Medical History         Diagnosis Date    Anxiety     Arthritis     [facility]     Chronic skin ulcer [facility]     Hyperlipidemia     [facility]     Prostate cancer [facility]     Sleep apnea       Past Surgical History           Procedure Laterality Date    APPENDECTOMY      BACK SURGERY      KNEE CARTILAGE SURGERY Right 1977    KNEE SURGERY Left     1980    NASAL SEPTUM SURGERY Left      Diet    DIET GENERAL;  Dietary Nutrition Supplements: Standard High Calorie Oral Supplement  Allergies    Anaprox [naproxen] and Vicodin [hydrocodone-acetaminophen]  Social History     Social History     Socioeconomic History    Marital status:      Spouse name: Not on file    Number of children: Not on file    Years of education: Not on file    Highest education level: Not on file   Occupational History    Not on file   Social Needs    Financial resource strain: Not on file    Food insecurity     Worry: Not on file     Inability: Not on file    Transportation needs     Medical: Not on file     Non-medical: Not on file   Tobacco Use    Smoking status: Current Every Day Smoker     Packs/day: 1.00     Years: 50.00     Pack years: 50.00     Types: Cigarettes     Start date: 1970    Smokeless tobacco: Never Used   Substance and Sexual Activity    Alcohol use: Not Currently    Drug use: Not Currently    Sexual activity: Not Currently     Partners: Female   Lifestyle  Physical activity     Days per week: Not on file     Minutes per session: Not on file    Stress: Not on file   Relationships    Social connections     Talks on phone: Not on file     Gets together: Not on file     Attends Oriental orthodox service: Not on file     Active member of club or organization: Not on file     Attends meetings of clubs or organizations: Not on file     Relationship status: Not on file    Intimate partner violence     Fear of current or ex partner: Not on file     Emotionally abused: Not on file     Physically abused: Not on file     Forced sexual activity: Not on file   Other Topics Concern    Not on file   Social History Narrative    Not on file     Family History          Problem Relation Age of Onset    Cancer Mother     Depression Mother     Heart Disease Mother      ROS     Review of Systems   Pertinent review of systems noted in HPI, all other ROS negative. Vitals     height is 5' 10\" (1.778 m) and weight is 164 lb (74.4 kg). His oral temperature is 98.2 °F (36.8 °C). His blood pressure is 120/60 and his pulse is 80. His respiration is 16 and oxygen saturation is 95%. Exam   Physical Exam   General appearance: No apparent distress, calm and cooperative. HEENT: Pupils equal, round, and reactive to light. Conjunctivae/corneas clear. Oral mucosa dry. Neck: Supple, with full range of motion. Trachea midline. Respiratory:  Normal respiratory effort. Clear to auscultation with exp wheeze noted throughout. Cardiovascular: Regular rate and rhythm with normal S1/S2. Abdomen: Soft, tender, slightly distended with active bowel sounds. Musculoskeletal: No clubbing, cyanosis bilaterally. Limited range of motion without deformity d/t physical deconditioning. BLE edema noted, +1. Skin: Skin color, texture, turgor normal.    Neurologic:  Neurovascularly intact without any focal sensory/motor deficits.  Cranial nerves: II-XII intact, grossly non-focal.  Psychiatric: Alert and oriented, thought content appropriate, normal insight  Capillary Refill: Brisk,< 3 seconds   Peripheral Pulses: +2 palpable, equal bilaterally        Labs   CBC  No results for input(s): WBC, RBC, HGB, HCT, MCV, MCH, MCHC, RDW, PLT, MPV in the last 72 hours. BMP  Recent Labs     11/22/20  1621 11/23/20  0517   * 121*   K 4.6 5.1   CL 86* 85*   CO2 27 27   BUN 14 14   CREATININE 0.6 0.6   GLUCOSE 129* 124*   MG  --  2.0   CALCIUM 8.5 8.3*     LFT  No results for input(s): AST, ALT, ALB, BILITOT, ALKPHOS, LIPASE in the last 72 hours. Invalid input(s): AMYLASE  INR  No results for input(s): INR, PROTIME in the last 72 hours. PTT  No results for input(s): APTT in the last 72 hours. Radiology        Xr Abdomen (kub) (single Ap View)    Result Date: 11/19/2020  PROCEDURE: XR ABDOMEN (KUB) (SINGLE AP VIEW) CLINICAL INFORMATION: Severe abdominal pain, hx of pancreatic CA . COMPARISON: No prior study. TECHNIQUE: 2 supine projections of the abdomen FINDINGS: Bowel gas pattern is nonspecific. Gas is present to the distal colon. Gas is not definitively identified and rectum. Stomach is mildly gas distended. Loops of gas filled bowel are seen in the mid abdomen one loop of borderline dilated small bowel. Properitoneal fat stripes are preserved. Left psoas fat stripe is obscured. No pathologic calcifications are seen. There is dextroscoliosis and prior L3-L5 laminectomy. Nonspecific gas pattern. **This report has been created using voice recognition software. It may contain minor errors which are inherent in voice recognition technology. ** Final report electronically signed by Dr. Raulito Greenfield on 11/19/2020 4:51 PM      Assessment/Recommendations    1. Pancreatic cancer with metastasis to liver - On active chemotherapy regimen, HOLD during hospital course. Pain management on case, discussing celiac plexus block. Will see pt in our office on Wed, 12/2/2020, at 8:45 am.    2.  Leukocytosis - WBC 13.6. Afebrile. Likely malignancy driven. Trend. 3.  Normocytic anemia - H/H 11/31. 6. MCV 92.4. Likely related to recent chemotherapy. Trend. 4.  Leukopenia - Platelet count 83. Likely related to recent chemotherapy. Trend. Case discussed with nurse and patient. Questions and concerns addressed. Plan made in collaboration with Dr. James Edwards.     Electronically signed by   LISA Martines CNP on 11/23/2020 at 3:50 PM

## 2020-11-23 NOTE — PROGRESS NOTES
Pain Management    Progress Note      11/23/2020 3:35 PM     · SUBJECTIVE:    · Chief Complaint: Cancer associated pain, abdominal and back pain   · Received a call that this patient was being discharged home today and to reconcile pain medications for discharge but then d/t patients low sodiuma level of 121 discharge is currently on hold. · He reports much better control of pain with medication changes and states that pain has been tolerable and that he has been able to rest more comfortably. He has used only 2 doses of prn liquid morphine in the past 24 hours and has not needed to take any prn IV dilaudid since last Friday. His pain remains across his abdomen and wrapping around to his low back- 4-5/10  · Medications effective:   Yes   · Pain rating is 5/10 in abdomen wrapping around to lower back   · Constipation: + BM 11/22/2020    Current medications:    nicotine  1 patch Transdermal Daily    sodium chloride flush  10 mL Intravenous 2 times per day    enoxaparin  40 mg Subcutaneous Daily    docusate sodium  100 mg Oral BID    gabapentin  400 mg Oral TID    tamsulosin  0.4 mg Oral Daily    naloxegol  12.5 mg Oral QAM    lidocaine  3 patch Transdermal Daily    morphine  30 mg Oral Q8H   ·   ·                                    sodium chloride flush, polyethylene glycol, promethazine **OR** ondansetron, acetaminophen **OR** [DISCONTINUED] acetaminophen, morphine  ·                                    @MEDSINFUSIONS        REVIEW OF SYSTEMS:  CONSTITUTIONAL:  positive for malaise, weight loss and decraesed appetite   EYES:  negative  HEENT:  negative  RESPIRATORY:  + tobacco use, wheezes   CARDIOVASCULAR:  negative  GASTROINTESTINAL:  + abdominal pain, + BM 11/22/2020  GENITOURINARY:  negative  SKIN:  jaundiced  HEMATOLOGIC/LYMPHATIC:  negative  MUSCULOSKELETAL:  positive for  myalgias, arthralgias and pain  NEUROLOGICAL:  negative  BEHAVIOR/PSYCH:  Negative   System review otherwise negative      PHYSICAL EXAM:  /60   Pulse 80   Temp 98.2 °F (36.8 °C) (Oral)   Resp 16   Ht 5' 10\" (1.778 m)   Wt 164 lb (74.4 kg)   SpO2 95%   BMI 23.53 kg/m²  I Body mass index is 23.53 kg/m². I   Wt Readings from Last 1 Encounters:   11/23/20 164 lb (74.4 kg)      awake  Orientation:   person, place, time  Mood: calm and relaxed   Affect: appropriate and calm   General appearance: mild distress, pale, anxious, thin, ill appearing      Memory:   normal,   Attention/Concentration: normal  Language:  normal     Cranial Nerves:  cranial nerves II-XII are grossly intact  ROM:  Normal ROM in all 4 extremities   Motor Exam:  Motor exam is symmetrical 5 out of 5 all extremities bilaterally  Tone:  normal     + tenderness throughout mid left side of back      Heart: regular rate, regular rhythm, normal S1, S2, no murmurs, rubs, clicks or gallops  Lungs: Diminished, scattered wheezes throughout  Abdomen: soft, tender and guarded, non-distended, active bowel sounds throughout, no masses or organomegaly     Skin: warm and dry, no rash or erythema  Peripheral vascular: Pulses: Normal upper and lower extremity pulses; Edema: + 1 lower extremities     DATA    No results for input(s): WBC, RBC, HGB, HCT, MCV, MCH, MCHC, RDW, PLT, MPV in the last 72 hours. Recent Labs     11/22/20  1621 11/23/20  0517   * 121*   K 4.6 5.1   CL 86* 85*   CO2 27 27   BUN 14 14   CREATININE 0.6 0.6   GLUCOSE 129* 124*   CALCIUM 8.5 8.3*     No results for input(s): POCGLU in the last 72 hours. ASSESSMENT   1. Metastatic pancreatic cancer   2. Cancer associated pain   3. Abdominal pain   4. Hyponatremia        PLAN  1. Continue pain management   2. Discontinued IV dilaudid as pain is much better controlled and patient has only required 2 doses of prn pain medications in the past 24 hours. 3. Continue MS Contin 30 mg scheduled every 8 hours for  maintenance pain relief  4.  Continue tylenol 650 mg every 4 hours as needed for mild pain of 1-3/10, oral morphine liquid solution 10 mg /5 ml solution, 20 mg every 4 hours as needed for moderate to severe breakthrough pain of 4-/10/10   5. Continue Lidoderm patches, Neurontin  6. Bowel program   7.  Will continue to follow     Spent 25 minutes evaluating and examining patient and completing documentation      LISA Rodriguez - CNP, 11/23/2020, 3:35 PM

## 2020-11-23 NOTE — CARE COORDINATION
Patient is discharged to home today with no services added/requested. 11/23/20, 1:08 PM EST    Patient goals/plan/ treatment preferences discussed by  and . Patient goals/plan/ treatment preferences reviewed with patient/ family. Patient/ family verbalize understanding of discharge plan and are in agreement with goal/plan/treatment preferences. Understanding was demonstrated using the teach back method. AVS provided by RN at time of discharge, which includes all necessary medical information pertaining to the patients current course of illness, treatment, post-discharge goals of care, and treatment preferences.

## 2020-11-24 LAB
ALBUMIN SERPL-MCNC: 2.7 G/DL (ref 3.5–5.1)
ANION GAP SERPL CALCULATED.3IONS-SCNC: 8 MEQ/L (ref 8–16)
BASOPHILS # BLD: 0.7 %
BASOPHILS ABSOLUTE: 0.1 THOU/MM3 (ref 0–0.1)
BUN BLDV-MCNC: 12 MG/DL (ref 7–22)
CALCIUM SERPL-MCNC: 8.1 MG/DL (ref 8.5–10.5)
CHLORIDE BLD-SCNC: 89 MEQ/L (ref 98–111)
CO2: 27 MEQ/L (ref 23–33)
CREAT SERPL-MCNC: 0.6 MG/DL (ref 0.4–1.2)
EOSINOPHIL # BLD: 13.5 %
EOSINOPHILS ABSOLUTE: 2.2 THOU/MM3 (ref 0–0.4)
ERYTHROCYTE [DISTWIDTH] IN BLOOD BY AUTOMATED COUNT: 13.8 % (ref 11.5–14.5)
ERYTHROCYTE [DISTWIDTH] IN BLOOD BY AUTOMATED COUNT: 46.6 FL (ref 35–45)
GFR SERPL CREATININE-BSD FRML MDRD: > 90 ML/MIN/1.73M2
GLUCOSE BLD-MCNC: 114 MG/DL (ref 70–108)
HCT VFR BLD CALC: 30.4 % (ref 42–52)
HEMOGLOBIN: 10.3 GM/DL (ref 14–18)
IMMATURE GRANS (ABS): 0.45 THOU/MM3 (ref 0–0.07)
IMMATURE GRANULOCYTES: 2.8 %
LYMPHOCYTES # BLD: 5.4 %
LYMPHOCYTES ABSOLUTE: 0.9 THOU/MM3 (ref 1–4.8)
MCH RBC QN AUTO: 32 PG (ref 26–33)
MCHC RBC AUTO-ENTMCNC: 33.9 GM/DL (ref 32.2–35.5)
MCV RBC AUTO: 94.4 FL (ref 80–94)
MONOCYTES # BLD: 10.5 %
MONOCYTES ABSOLUTE: 1.7 THOU/MM3 (ref 0.4–1.3)
NUCLEATED RED BLOOD CELLS: 0 /100 WBC
PLATELET # BLD: 365 THOU/MM3 (ref 130–400)
PLATELET ESTIMATE: ADEQUATE
PMV BLD AUTO: 11 FL (ref 9.4–12.4)
POTASSIUM SERPL-SCNC: 4.7 MEQ/L (ref 3.5–5.2)
PRO-BNP: 207.4 PG/ML (ref 0–900)
RBC # BLD: 3.22 MILL/MM3 (ref 4.7–6.1)
SCAN OF BLOOD SMEAR: NORMAL
SEG NEUTROPHILS: 67.1 %
SEGMENTED NEUTROPHILS ABSOLUTE COUNT: 10.9 THOU/MM3 (ref 1.8–7.7)
SODIUM BLD-SCNC: 118 MEQ/L (ref 135–145)
SODIUM BLD-SCNC: 123 MEQ/L (ref 135–145)
SODIUM BLD-SCNC: 124 MEQ/L (ref 135–145)
WBC # BLD: 16.3 THOU/MM3 (ref 4.8–10.8)

## 2020-11-24 PROCEDURE — 2500000003 HC RX 250 WO HCPCS: Performed by: INTERNAL MEDICINE

## 2020-11-24 PROCEDURE — 99232 SBSQ HOSP IP/OBS MODERATE 35: CPT | Performed by: NURSE PRACTITIONER

## 2020-11-24 PROCEDURE — 6370000000 HC RX 637 (ALT 250 FOR IP): Performed by: NURSE PRACTITIONER

## 2020-11-24 PROCEDURE — 6370000000 HC RX 637 (ALT 250 FOR IP): Performed by: INTERNAL MEDICINE

## 2020-11-24 PROCEDURE — 6370000000 HC RX 637 (ALT 250 FOR IP): Performed by: STUDENT IN AN ORGANIZED HEALTH CARE EDUCATION/TRAINING PROGRAM

## 2020-11-24 PROCEDURE — 6360000002 HC RX W HCPCS: Performed by: NURSE PRACTITIONER

## 2020-11-24 PROCEDURE — 2580000003 HC RX 258: Performed by: STUDENT IN AN ORGANIZED HEALTH CARE EDUCATION/TRAINING PROGRAM

## 2020-11-24 PROCEDURE — 1200000000 HC SEMI PRIVATE

## 2020-11-24 PROCEDURE — 97116 GAIT TRAINING THERAPY: CPT

## 2020-11-24 PROCEDURE — 80048 BASIC METABOLIC PNL TOTAL CA: CPT

## 2020-11-24 PROCEDURE — 99233 SBSQ HOSP IP/OBS HIGH 50: CPT | Performed by: INTERNAL MEDICINE

## 2020-11-24 PROCEDURE — 36415 COLL VENOUS BLD VENIPUNCTURE: CPT

## 2020-11-24 PROCEDURE — 99232 SBSQ HOSP IP/OBS MODERATE 35: CPT | Performed by: INTERNAL MEDICINE

## 2020-11-24 PROCEDURE — 85025 COMPLETE CBC W/AUTO DIFF WBC: CPT

## 2020-11-24 PROCEDURE — 97110 THERAPEUTIC EXERCISES: CPT

## 2020-11-24 PROCEDURE — 84295 ASSAY OF SERUM SODIUM: CPT

## 2020-11-24 PROCEDURE — 82040 ASSAY OF SERUM ALBUMIN: CPT

## 2020-11-24 PROCEDURE — 83880 ASSAY OF NATRIURETIC PEPTIDE: CPT

## 2020-11-24 RX ORDER — MORPHINE SULFATE 10 MG/5ML
15 SOLUTION ORAL EVERY 4 HOURS PRN
Status: DISCONTINUED | OUTPATIENT
Start: 2020-11-24 | End: 2020-11-26 | Stop reason: HOSPADM

## 2020-11-24 RX ORDER — POLYETHYLENE GLYCOL 3350 17 G/17G
17 POWDER, FOR SOLUTION ORAL DAILY
Status: DISCONTINUED | OUTPATIENT
Start: 2020-11-24 | End: 2020-11-26 | Stop reason: HOSPADM

## 2020-11-24 RX ORDER — BUMETANIDE 0.25 MG/ML
2 INJECTION, SOLUTION INTRAMUSCULAR; INTRAVENOUS 2 TIMES DAILY
Status: DISCONTINUED | OUTPATIENT
Start: 2020-11-24 | End: 2020-11-26

## 2020-11-24 RX ORDER — POLYETHYLENE GLYCOL 3350 17 G
2 POWDER IN PACKET (EA) ORAL PRN
Status: DISCONTINUED | OUTPATIENT
Start: 2020-11-24 | End: 2020-11-26 | Stop reason: HOSPADM

## 2020-11-24 RX ORDER — BISACODYL 10 MG
10 SUPPOSITORY, RECTAL RECTAL DAILY PRN
Status: DISCONTINUED | OUTPATIENT
Start: 2020-11-24 | End: 2020-11-26 | Stop reason: HOSPADM

## 2020-11-24 RX ORDER — MORPHINE SULFATE 10 MG/5ML
20 SOLUTION ORAL EVERY 4 HOURS PRN
Status: DISCONTINUED | OUTPATIENT
Start: 2020-11-24 | End: 2020-11-26 | Stop reason: HOSPADM

## 2020-11-24 RX ORDER — TAMSULOSIN HYDROCHLORIDE 0.4 MG/1
0.8 CAPSULE ORAL DAILY
Status: DISCONTINUED | OUTPATIENT
Start: 2020-11-24 | End: 2020-11-26 | Stop reason: HOSPADM

## 2020-11-24 RX ORDER — SODIUM CHLORIDE 1000 MG
2 TABLET, SOLUBLE MISCELLANEOUS EVERY 6 HOURS SCHEDULED
Status: DISCONTINUED | OUTPATIENT
Start: 2020-11-24 | End: 2020-11-25

## 2020-11-24 RX ORDER — MORPHINE SULFATE 10 MG/5ML
15 SOLUTION ORAL EVERY 4 HOURS PRN
Status: DISCONTINUED | OUTPATIENT
Start: 2020-11-24 | End: 2020-11-24

## 2020-11-24 RX ADMIN — GABAPENTIN 400 MG: 400 CAPSULE ORAL at 14:45

## 2020-11-24 RX ADMIN — MORPHINE SULFATE 30 MG: 30 TABLET, FILM COATED, EXTENDED RELEASE ORAL at 18:43

## 2020-11-24 RX ADMIN — BUMETANIDE 2 MG: 0.25 INJECTION INTRAMUSCULAR; INTRAVENOUS at 14:43

## 2020-11-24 RX ADMIN — DOCUSATE SODIUM 100 MG: 100 CAPSULE, LIQUID FILLED ORAL at 08:44

## 2020-11-24 RX ADMIN — GABAPENTIN 400 MG: 400 CAPSULE ORAL at 08:44

## 2020-11-24 RX ADMIN — GABAPENTIN 400 MG: 400 CAPSULE ORAL at 19:59

## 2020-11-24 RX ADMIN — NALOXEGOL OXALATE 12.5 MG: 12.5 TABLET, FILM COATED ORAL at 08:44

## 2020-11-24 RX ADMIN — POLYETHYLENE GLYCOL 3350 17 G: 17 POWDER, FOR SOLUTION ORAL at 08:44

## 2020-11-24 RX ADMIN — MORPHINE SULFATE 30 MG: 30 TABLET, FILM COATED, EXTENDED RELEASE ORAL at 08:44

## 2020-11-24 RX ADMIN — MAGNESIUM HYDROXIDE 30 ML: 2400 SUSPENSION ORAL at 11:30

## 2020-11-24 RX ADMIN — DOCUSATE SODIUM 100 MG: 100 CAPSULE, LIQUID FILLED ORAL at 19:59

## 2020-11-24 RX ADMIN — TAMSULOSIN HYDROCHLORIDE 0.8 MG: 0.4 CAPSULE ORAL at 08:47

## 2020-11-24 RX ADMIN — SODIUM CHLORIDE TAB 1 GM 2 G: 1 TAB at 14:44

## 2020-11-24 RX ADMIN — MORPHINE SULFATE 20 MG: 10 SOLUTION ORAL at 18:58

## 2020-11-24 RX ADMIN — MORPHINE SULFATE 20 MG: 10 SOLUTION ORAL at 22:58

## 2020-11-24 RX ADMIN — MORPHINE SULFATE 20 MG: 10 SOLUTION ORAL at 06:14

## 2020-11-24 RX ADMIN — MORPHINE SULFATE 30 MG: 30 TABLET, FILM COATED, EXTENDED RELEASE ORAL at 01:27

## 2020-11-24 RX ADMIN — BUMETANIDE 2 MG: 0.25 INJECTION INTRAMUSCULAR; INTRAVENOUS at 19:58

## 2020-11-24 RX ADMIN — MORPHINE SULFATE 20 MG: 10 SOLUTION ORAL at 11:28

## 2020-11-24 RX ADMIN — SODIUM CHLORIDE TAB 1 GM 2 G: 1 TAB at 18:43

## 2020-11-24 RX ADMIN — SODIUM CHLORIDE, PRESERVATIVE FREE 10 ML: 5 INJECTION INTRAVENOUS at 19:58

## 2020-11-24 RX ADMIN — HYDROMORPHONE HYDROCHLORIDE 1 MG: 1 INJECTION, SOLUTION INTRAMUSCULAR; INTRAVENOUS; SUBCUTANEOUS at 15:40

## 2020-11-24 ASSESSMENT — PAIN SCALES - GENERAL
PAINLEVEL_OUTOF10: 8
PAINLEVEL_OUTOF10: 7
PAINLEVEL_OUTOF10: 9
PAINLEVEL_OUTOF10: 7
PAINLEVEL_OUTOF10: 8
PAINLEVEL_OUTOF10: 7
PAINLEVEL_OUTOF10: 7
PAINLEVEL_OUTOF10: 6
PAINLEVEL_OUTOF10: 6

## 2020-11-24 NOTE — PROGRESS NOTES
[facility]         Renal Inpatient Follow-Up note         11/24/2020 8:43 AM    Pt Name:   Eulalio Guillaume  YOB: 1955  Attending:   Kaycee Guillory MD    Chief Complaint : Eulalio Guillaume is a 76 y.o. male being followed by nephrology for hyponatremia    Interval History :   Patient seen and examined by me. No distress  Feels okay denies any chest pain or shortness of breath still has leg swelling     Scheduled Medications :    polyethylene glycol  17 g Oral Daily    tamsulosin  0.8 mg Oral Daily    nicotine  1 patch Transdermal Daily    sodium chloride flush  10 mL Intravenous 2 times per day    enoxaparin  40 mg Subcutaneous Daily    docusate sodium  100 mg Oral BID    gabapentin  400 mg Oral TID    naloxegol  12.5 mg Oral QAM    lidocaine  3 patch Transdermal Daily    morphine  30 mg Oral Q8H      sodium chloride 75 mL/hr at 11/23/20 1450       Vitals :  /60   Pulse 76   Temp 98.7 °F (37.1 °C) (Oral)   Resp 16   Ht 5' 10\" (1.778 m)   Wt 163 lb 12.8 oz (74.3 kg)   SpO2 95%   BMI 23.50 kg/m²     24HR INTAKE/OUTPUT:      Intake/Output Summary (Last 24 hours) at 11/24/2020 0843  Last data filed at 11/24/2020 0405  Gross per 24 hour   Intake 2308.91 ml   Output --   Net 2308.91 ml     Last 3 weights  Wt Readings from Last 3 Encounters:   11/24/20 163 lb 12.8 oz (74.3 kg)   11/19/20 159 lb (72.1 kg)   11/13/20 159 lb 3.2 oz (72.2 kg)           Physical Exam :  General Appearance:  Well developed. No distress  Mouth/Throat:  Oral mucosa moist  Neck:  Supple, no JVD  Lungs:  Breath sounds: clear  Heart[de-identified]  S1,S2 heard  Abdomen:  Soft, non - tender  Musculoskeletal:  Edema -noted bilaterally more prominent in the dependent areas         Last 3 CBC   No results for input(s): WBC, RBC, HGB, HCT, PLT in the last 72 hours.   Last 3 CMP  Recent Labs     11/22/20  1621 11/23/20  0517 11/23/20  1917 11/24/20  0516   * 121* 124* 124*   K 4.6 5.1  --  4.7   CL 86* 85*  --  89*   CO2 27 27  --  27   BUN 14 14  --  12   CREATININE 0.6 0.6  --  0.6   CALCIUM 8.5 8.3*  --  8.1*                Assessment    1. Renal -renal function appears to be stable at baseline  2. Hyponatremia-as per the urine lites patient is intravascular volume depleted, urine sodium is less than 20 and urine osmolarity is high  ? Does have bilateral lower extremity edema, also some component of hypervolemic hyponatremia  ? Sodium level is stable in fact slightly better but he will benefit from diuretics for now continue IV fluids  ? Check a sodium level at noon and mostly will add salt pills and diuretics     3. Blood pressure running well no history of essential hypertension  4. Uncontrolled pain due to pancreatic cancer on narcotics  5. Metastatic pancreatic cancer  6. Meds reviewed and discussed with him in detail    Addendum-4:06 PM-reviewed his latest sodium level which is 118 at 12 PM.  I have ordered sodium chloride tablets, 1500 mL fluid restriction Bumex 2 mg IV twice daily and stop the IV fluids. Sodium every 6 hours      SHANA Harding D.  [facility].

## 2020-11-24 NOTE — PROGRESS NOTES
[id number] [place] [id number]  INPATIENT PHYSICAL THERAPY  DAILY NOTE  STR ONC MED 5K - 5K-10/010-A    Time In: 1100  Time Out: 1135  Timed Code Treatment Minutes: 35 Minutes  Minutes: 35          Date: 2020  Patient Name: Eve Uribe,  Gender:  male        MRN: [id number]  : 1955  (76 y.o.)     Referring Practitioner: Laura Heard MD  Diagnosis: Pancreatic Cancer  Additional Pertinent Hx: Pt medical history of prostate cancer (2015 s/p chemoradiation) and recent diagnosis of pancreatic cancer. He initially experienced abdominal pain several months ago and upon evaluation with a CT abdomen pelvis on 10/21/2020 revealed a necrotic mass involving the neck, proximal body of the pancreas. There is also some mass involving the stomach concerning for malignancy. A biopsy done on 10/27/20 revealed metastatic pancreatic cancer. He was evaluated by the oncology team on 11/10/2020 in which he underwent his first treatment of chemotherapy. In addition, there were plans to admit the patient directly for pain management however due to the ED being overcrowded he was unable to be admitted. In addition, they increased his long-acting morphine to 30/30/15 mg in 8-hour intervals with 15 mg morphine immediate release for breakthrough pain. He states he has been taking about 4 of the 50 mg immediate release tablets per day. He states that despite this increase his pain is still excruciating 10/10. He is unable to even sit upright due to the pain being so severe. The worst of his pain is on the right side of the abdomen but will radiate through both of the lower parts of his abdomen and up the entire abdomen. He also endorses severe pain radiating around to his back. He states that he has very poor appetite with associated nausea/vomiting. In addition, he notes that he has been able to pass bowel movements for a few days and has been only having very loose stools.      Prior Level of Function:  Lives With: Friend(s)  Type of Home: House  Home Layout: Two level, Able to Live on Main level with bedroom/bathroom  Home Access: Stairs to enter with rails  Entrance Stairs - Number of Steps: 4-5 steps   Bathroom Shower/Tub: Tub/Shower unit  Bathroom Toilet: Standard  Bathroom Accessibility: Accessible    Receives Help From: Friend(s)  ADL Assistance: Independent  Homemaking Assistance: Needs assistance  Homemaking Responsibilities: Yes  Ambulation Assistance: Independent  Transfer Assistance: Independent  Active : Yes  Additional Comments: Pt was working until Oct. 20.  He has been having difficulty with fatigue and some loss of weight as he has also been having nausea along with the abdominal pain. NO AD used for ambulating. Restrictions/Precautions:  Restrictions/Precautions: General Precautions     SUBJECTIVE: RN approved session. Patient in bed upon arrival, agreed to therapy. Requesting to use bathroom at start of session. PAIN: 8.5/10, left side and across low back, RN notified and she administered pain meds at the end of session. OBJECTIVE:  Bed Mobility:  Supine to Sit: Modified Independent  Sit to Supine: Modified Independent     Transfers:  Sit to Stand: Supervision  Stand to Sit:Supervision    Ambulation:  Stand By Assistance, with verbal cues   Distance: ~350 ft. x1   Surface: Level Tile  Device:No AD, but used IV pole for support due to pain in side. Gait Deviations: Forward Flexed Posture, Slow Capri, Decreased Step Length Bilaterally, Decreased Gait Speed and Mild Path Deviations. Balance:  Dynamic Standing Balance: Supervision, standing with intermittent UE support while completing functional bathroom tasks, grossly steady with no LOB noted. Exercise:  Patient was guided in 1 set(s) 10 reps of exercise to both lower extremities. Seated heel/toe raises and Long arc quads. Exercises were completed for increased independence with functional mobility.     Functional Outcome Measures: Not completed       ASSESSMENT:  Assessment: Patient progressing toward established goals. Tolerated session well, showing steady progress. Limited due to increased pain in left side, which caused him to move more guarded. Would benefit from additional skilled PT to increase strength, endurance, balance and safety for improved functional mobility. Activity Tolerance:  Patient tolerance of  treatment: good. Equipment Recommendations:Equipment Needed: No  Discharge Recommendations: Continue to assess pending progress, Home with HH or HEP    Plan: Times per week: 5x GM  Times per day: Daily  Current Treatment Recommendations: Strengthening, Balance Training, Endurance Training, Functional Mobility Training, Transfer Training, Stair training, Gait Training    Patient Education  Patient Education: Plan of Care, Precautions/Restrictions, Transfers, Reviewed Prior Education, Gait, Health Promotion and Wellness Education, Home Safety Education    Goals:  Patient goals : Get pain under controll  Short term goals  Time Frame for Short term goals: by discharge  Short term goal 1: Pt to be Mod I with bed mobility for safety in the home. Short term goal 2: Pt to demo Mod I with transfers for safety in the home. Short term goal 3: Pt to demo Mod I with gait >100 feet for safety in the home. Short term goal 4: Pt to be Mod I up/down 4 steps with HR for safety in/out of home. Long term goals  Time Frame for Long term goals : NA due to short ELOS    Following session, patient left in safe position with all fall risk precautions in place.

## 2020-11-24 NOTE — PROGRESS NOTES
Assessment and Plan:        1. Uncontrolled pain due to pancreatic cancer- pt says much improved. Now 6/10  2. Hyponatremia- sodium slightly improved this am. - prior urine studies suggested sodium depletion; will hold discharge. Monitor. He has ascites which may be a factor. Nephrology consulted. Currently on NS. Thyroid, cortisol ok. 3. Leg edema- improved with wraps; suspect due to low albumin  4. Constipation- opioid induced- other meds added  5. Difficulty voiding- possibly due to opioid- bladder scan, boost Flomax    CC:  Abdominal pain  HPI: pt with pancreas cancer, intractable pain, admitted for pain control. He is better. His sodium, however, has been progressively dropping. He has swelling in his legs. ROS (12 point review of systems completed. Pertinent positives noted. Otherwise ROS is negative) : appetite better.   constipation  PMH:  Per HPI  SHX:  Lives with wife  FHX: cancer, heart  Allergies: See above    Medications:     sodium chloride 75 mL/hr at 11/23/20 1450      polyethylene glycol  17 g Oral Daily    tamsulosin  0.8 mg Oral Daily    nicotine  1 patch Transdermal Daily    sodium chloride flush  10 mL Intravenous 2 times per day    enoxaparin  40 mg Subcutaneous Daily    docusate sodium  100 mg Oral BID    gabapentin  400 mg Oral TID    naloxegol  12.5 mg Oral QAM    lidocaine  3 patch Transdermal Daily    morphine  30 mg Oral Q8H       Vital Signs:   BP (!) 137/59   Pulse 81   Temp 98.5 °F (36.9 °C) (Oral)   Resp 16   Ht 5' 10\" (1.778 m)   Wt 163 lb 12.8 oz (74.3 kg)   SpO2 95%   BMI 23.50 kg/m²      Intake/Output Summary (Last 24 hours) at 11/24/2020 0236  Last data filed at 11/24/2020 0405  Gross per 24 hour   Intake 2308.91 ml   Output --   Net 2308.91 ml        Physical Examination: General appearance - chronically ill appearing  Mental status - alert, oriented to person, place, and time  Neck - supple, no significant adenopathy, no JVD, or carotid bruits  Chest -clear  Heart - no JVD  Abdomen - soft, nontender, nondistended, no masses or organomegaly  Neurological - alert, oriented, normal speech, no focal findings or movement disorder noted  Musculoskeletal - no muscular tenderness noted  Extremities - wraps on, swelling improved  Skin - normal coloration and turgor, no rashes, no suspicious skin lesions noted    Data: (All radiographs, tracings, PFTs, and imaging are personally viewed and interpreted unless otherwise noted).     Reviewed labs      Electronically signed by Claudene Stabs, MD on 11/24/2020 at 6:29 AM

## 2020-11-24 NOTE — PROGRESS NOTES
Pain Management    Progress Note      11/24/2020 3:21 PM     · SUBJECTIVE:    · Chief Complaint: Cancer associated pain, abdominal and back pain   · Today patient has two main concerns: an increase of pain and wanting to go outside to get fresh air and smoke. · Today patient is having complaints of increased pain in his left side and back in which he states increased at 3 am last night and has been difficult to control since. States that pain was 9/10 most of the days and currently is 7/10 as it has decraesed some. A friend was present at bedside and apparently patient relations was called by his wife d/t his pain issues and changes being made about not being able to receive IV pain medications anymore. I gave detailed information about the current medications available and his prn usage over the past several days and discussed in the past 24 hours he has used 3 doses out of the 6 available prn doses and in the 24 hours before that used only 2 prn doses out of the 6 available ones. I actually discussed with patient his pain and medication uses with Dr. Bull Carrasco who he will follow up outpatient and d/t his prn usage it was actually recommended to decrease pain medications. I discussed that I am not able to adjust any medications when he is using only half or less than half of the available prn pain medications.    · Pain rating is 7/10 left side and radiating in mid and lower back- shooting   · Constipation: + BM 11/22/2020    Current medications:   Current Facility-Administered Medications   Medication Dose Route Frequency Provider Last Rate Last Dose    polyethylene glycol (GLYCOLAX) packet 17 g  17 g Oral Daily Luiz López MD   17 g at 11/24/20 0844    bisacodyl (DULCOLAX) EC tablet 5 mg  5 mg Oral Daily PRN Luiz López MD        bisacodyl (DULCOLAX) suppository 10 mg  10 mg Rectal Daily PRN Luiz López MD        magnesium hydroxide (MILK OF MAGNESIA) 400 MG/5ML suspension 30 mL  30 mL Oral Daily PRN Francisco Klinefelter, MD   30 mL at 11/24/20 1130    tamsulosin (FLOMAX) capsule 0.8 mg  0.8 mg Oral Daily Francisco Klinefelter, MD   0.8 mg at 11/24/20 0847    sodium chloride tablet 2 g  2 g Oral 4 times per day Vicki Sellers MD   2 g at 11/24/20 1444    bumetanide (BUMEX) injection 2 mg  2 mg Intravenous BID Vicki Sellers MD   2 mg at 11/24/20 1443    morphine 10 MG/5ML solution 15 mg  15 mg Oral Q4H PRN Paula Baptise, APRN - CNP        Or    morphine 10 MG/5ML solution 20 mg  20 mg Oral Q4H PRN Paula Baptise, APRN - CNP        nicotine polacrilex (COMMIT) lozenge 2 mg  2 mg Oral PRN Francisco Klinefelter, MD        HYDROmorphone (DILAUDID) injection 1 mg  1 mg Intravenous Once Paula Baptise, APRN - CNP        nicotine (NICODERM CQ) 21 MG/24HR 1 patch  1 patch Transdermal Daily Francisco Klinefelter, MD        sodium chloride flush 0.9 % injection 10 mL  10 mL Intravenous 2 times per day Robert Waldrop MD   10 mL at 11/23/20 1012    sodium chloride flush 0.9 % injection 10 mL  10 mL Intravenous PRN Robert Waldrop MD   10 mL at 11/20/20 0720    promethazine (PHENERGAN) tablet 12.5 mg  12.5 mg Oral Q6H PRN Robert Wadlrop MD        Or    ondansetron (ZOFRAN) injection 4 mg  4 mg Intravenous Q6H PRN Delia Moctezuma MD        enoxaparin (LOVENOX) injection 40 mg  40 mg Subcutaneous Daily Robert Waldrop MD   40 mg at 11/20/20 1749    docusate sodium (COLACE) capsule 100 mg  100 mg Oral BID Robert Waldrop MD   100 mg at 11/24/20 0844    gabapentin (NEURONTIN) capsule 400 mg  400 mg Oral TID Robert Waldrop MD   400 mg at 11/24/20 1445    naloxegol (MOVANTIK) tablet 12.5 mg  12.5 mg Oral QAM Robert Waldrop MD   12.5 mg at 11/24/20 0844    acetaminophen (TYLENOL) tablet 650 mg  650 mg Oral Q4H PRN Paula Stern, APRN - CNP        lidocaine 4 % external patch 3 patch  3 patch Transdermal Daily LISA Molina - CNP   3 patch at 11/20/20 0945    morphine (MS CONTIN) extended release tablet 30 mg  30 mg Oral Q8H Je Tolentino APRN - CNP   30 mg at 11/24/20 0844       REVIEW OF SYSTEMS:  CONSTITUTIONAL:  positive for malaise, weight loss and decraesed appetite   EYES:  negative  HEENT:  negative  RESPIRATORY:  + tobacco use, wheezes   CARDIOVASCULAR:  negative  GASTROINTESTINAL:  + abdominal pain, + BM 11/22/2020  GENITOURINARY:  negative  SKIN:  jaundiced  HEMATOLOGIC/LYMPHATIC:  negative  MUSCULOSKELETAL:  positive for  myalgias, arthralgias and pain  NEUROLOGICAL:  negative  BEHAVIOR/PSYCH:  Negative   System review otherwise negative      PHYSICAL EXAM:  /62   Pulse 76   Temp 98.6 °F (37 °C) (Oral)   Resp 16   Ht 5' 10\" (1.778 m)   Wt 163 lb 12.8 oz (74.3 kg)   SpO2 95%   BMI 23.50 kg/m²  I Body mass index is 23.5 kg/m².  I   Wt Readings from Last 1 Encounters:   11/24/20 163 lb 12.8 oz (74.3 kg)      awake  Orientation:   person, place, time  Mood: calm  Affect: appropriate and calm   General appearance: mild distress, pale, anxious, thin, ill appearing      Memory:   decraesed thought processing   Attention/Concentration: normal  Language:  normal     Cranial Nerves:  cranial nerves II-XII are grossly intact  ROM:  Normal ROM in all 4 extremities   Motor Exam:  Motor exam is symmetrical 5 out of 5 all extremities bilaterally  Tone:  normal     + tenderness throughout mid left side of back      Heart: regular rate, regular rhythm, normal S1, S2, no murmurs, rubs, clicks or gallops  Lungs: Diminished, scattered wheezes throughout  Abdomen: soft, tender and guarded, non-distended, active bowel sounds throughout, no masses or organomegaly     Skin: warm and dry, no rash or erythema  Peripheral vascular: Pulses: Normal upper and lower extremity pulses; Edema: + 1 lower extremities     DATA    Recent Labs     11/24/20  0924   WBC 16.3*   RBC 3.22*   HGB 10.3*   HCT 30.4*   MCV 94.4*   MCH 32.0   MCHC 33.9      MPV 11.0     Recent Labs     11/22/20  1621 11/23/20  0517 11/23/20  1917 11/24/20  0516 11/24/20  1159   * 121* 124* 124* 118*   K 4.6 5.1  --  4.7  --    CL 86* 85*  --  89*  --    CO2 27 27  --  27  --    BUN 14 14  --  12  --    CREATININE 0.6 0.6  --  0.6  --    GLUCOSE 129* 124*  --  114*  --    CALCIUM 8.5 8.3*  --  8.1*  --      No results for input(s): POCGLU in the last 72 hours. ASSESSMENT   1. Metastatic pancreatic cancer   2. Cancer associated pain   3. Abdominal pain   4. Hyponatremia        PLAN  1. Continue pain management   2. Ordered a dilaudid IV 1 mg X 1 to help calm pain. No other changes as patient has used only half or less than half of available prn pain medications over the past couple of days and he was encouraged to use prn pain medications if needed. Discussed this with patient, his visitor and also patient relations who was present   3. Continue MS Contin 30 mg scheduled every 8 hours for  maintenance pain relief  4. Continue tylenol 650 mg every 4 hours as needed for mild pain of 1-3/10, oral morphine liquid solution 10 mg /5 ml solution, 15 mg to 20 mg every 4 hours as needed for moderate to severe breakthrough pain of 410/10   5. Continue Lidoderm patches, Neurontin  6. Bowel program   7.  Will continue to follow     Spent 28 minutes evaluating and examining patient and completing documentation      LISA Solis - CNP, 11/24/2020, 3:21 PM

## 2020-11-24 NOTE — PROGRESS NOTES
[address] [employer] [facility]  Occupational Therapy  Daily Note  Time:   Time In: 1743  Time Out: 1505  Timed Code Treatment Minutes: 13 Minutes  Minutes: 13          Date: 2020  Patient Name: Eve Uribe,   Gender: male      Room: [facility]10/010-A  MRN: [id number]  : 1955  (76 y.o.)  Referring Practitioner: Dr. Amber Baker MD  Diagnosis: Pancreatic Cancer  Additional Pertinent Hx: Pt medical history of prostate cancer (2015 s/p chemoradiation) and recent diagnosis of pancreatic cancer. He initially experienced abdominal pain several months ago and upon evaluation with a CT abdomen pelvis on 10/21/2020 revealed a necrotic mass involving the neck, proximal body of the pancreas. There is also some mass involving the stomach concerning for malignancy. A biopsy done on 10/27/20 revealed metastatic pancreatic cancer. He was evaluated by the oncology team on 11/10/2020 in which he underwent his first treatment of chemotherapy. In addition, there were plans to admit the patient directly for pain management however due to the ED being overcrowded he was unable to be admitted. In addition, they increased his long-acting morphine to 30/30/15 mg in 8-hour intervals with 15 mg morphine immediate release for breakthrough pain. He states he has been taking about 4 of the 50 mg immediate release tablets per day. He states that despite this increase his pain is still excruciating 10/10. He is unable to even sit upright due to the pain being so severe. The worst of his pain is on the right side of the abdomen but will radiate through both of the lower parts of his abdomen and up the entire abdomen. He also endorses severe pain radiating around to his back. He states that he has very poor appetite with associated nausea/vomiting.   In addition, he notes that he has been able to pass bowel movements for a few days and has been only having very loose for ease of doing community mobility and working in the yard. Short term goal 2: Pt will complete BUE moderate/high resistance HEP while following any handout needed and taking short rest breaks to increase his strength and endurance for ease of doing ADLs and yardwork. Short term goal 3: Pt will complete BADLs with supervision while using any AE needed to increase his safety and help with pain management for returning to being independent at home. Short term goal 4: Pt will complete standing ADLs for over 8 minute duration with cues for back protection to increase his endurance and help manage his pain for ease of showering or dressing. Following session, patient left in safe position with all fall risk precautions in place.

## 2020-11-25 LAB
ANION GAP SERPL CALCULATED.3IONS-SCNC: 9 MEQ/L (ref 8–16)
BUN BLDV-MCNC: 13 MG/DL (ref 7–22)
CALCIUM SERPL-MCNC: 7.4 MG/DL (ref 8.5–10.5)
CHLORIDE BLD-SCNC: 90 MEQ/L (ref 98–111)
CO2: 28 MEQ/L (ref 23–33)
CREAT SERPL-MCNC: 0.6 MG/DL (ref 0.4–1.2)
GFR SERPL CREATININE-BSD FRML MDRD: > 90 ML/MIN/1.73M2
GLUCOSE BLD-MCNC: 116 MG/DL (ref 70–108)
POTASSIUM SERPL-SCNC: 4 MEQ/L (ref 3.5–5.2)
SODIUM BLD-SCNC: 124 MEQ/L (ref 135–145)
SODIUM BLD-SCNC: 127 MEQ/L (ref 135–145)
SODIUM BLD-SCNC: 128 MEQ/L (ref 135–145)
SODIUM BLD-SCNC: 128 MEQ/L (ref 135–145)
SODIUM BLD-SCNC: 136 MEQ/L (ref 135–145)

## 2020-11-25 PROCEDURE — 97116 GAIT TRAINING THERAPY: CPT

## 2020-11-25 PROCEDURE — 84295 ASSAY OF SERUM SODIUM: CPT

## 2020-11-25 PROCEDURE — 99232 SBSQ HOSP IP/OBS MODERATE 35: CPT | Performed by: NURSE PRACTITIONER

## 2020-11-25 PROCEDURE — 97535 SELF CARE MNGMENT TRAINING: CPT

## 2020-11-25 PROCEDURE — 1200000000 HC SEMI PRIVATE

## 2020-11-25 PROCEDURE — 2580000003 HC RX 258: Performed by: STUDENT IN AN ORGANIZED HEALTH CARE EDUCATION/TRAINING PROGRAM

## 2020-11-25 PROCEDURE — 6370000000 HC RX 637 (ALT 250 FOR IP): Performed by: INTERNAL MEDICINE

## 2020-11-25 PROCEDURE — 80048 BASIC METABOLIC PNL TOTAL CA: CPT

## 2020-11-25 PROCEDURE — 36415 COLL VENOUS BLD VENIPUNCTURE: CPT

## 2020-11-25 PROCEDURE — 2500000003 HC RX 250 WO HCPCS: Performed by: INTERNAL MEDICINE

## 2020-11-25 PROCEDURE — 99233 SBSQ HOSP IP/OBS HIGH 50: CPT | Performed by: NURSE PRACTITIONER

## 2020-11-25 PROCEDURE — 6370000000 HC RX 637 (ALT 250 FOR IP): Performed by: NURSE PRACTITIONER

## 2020-11-25 PROCEDURE — 6370000000 HC RX 637 (ALT 250 FOR IP): Performed by: STUDENT IN AN ORGANIZED HEALTH CARE EDUCATION/TRAINING PROGRAM

## 2020-11-25 PROCEDURE — 99232 SBSQ HOSP IP/OBS MODERATE 35: CPT | Performed by: INTERNAL MEDICINE

## 2020-11-25 PROCEDURE — 97110 THERAPEUTIC EXERCISES: CPT

## 2020-11-25 RX ORDER — SODIUM CHLORIDE 1000 MG
2 TABLET, SOLUBLE MISCELLANEOUS
Status: DISCONTINUED | OUTPATIENT
Start: 2020-11-25 | End: 2020-11-26 | Stop reason: HOSPADM

## 2020-11-25 RX ORDER — POLYETHYLENE GLYCOL 3350 17 G/17G
17 POWDER, FOR SOLUTION ORAL ONCE
Status: COMPLETED | OUTPATIENT
Start: 2020-11-25 | End: 2020-11-25

## 2020-11-25 RX ORDER — CYCLOBENZAPRINE HCL 10 MG
5 TABLET ORAL 3 TIMES DAILY PRN
Status: DISCONTINUED | OUTPATIENT
Start: 2020-11-25 | End: 2020-11-26 | Stop reason: HOSPADM

## 2020-11-25 RX ADMIN — SODIUM CHLORIDE, PRESERVATIVE FREE 10 ML: 5 INJECTION INTRAVENOUS at 20:35

## 2020-11-25 RX ADMIN — GABAPENTIN 400 MG: 400 CAPSULE ORAL at 13:11

## 2020-11-25 RX ADMIN — SODIUM CHLORIDE TAB 1 GM 2 G: 1 TAB at 13:11

## 2020-11-25 RX ADMIN — POLYETHYLENE GLYCOL 3350 17 G: 17 POWDER, FOR SOLUTION ORAL at 10:25

## 2020-11-25 RX ADMIN — MORPHINE SULFATE 30 MG: 30 TABLET, FILM COATED, EXTENDED RELEASE ORAL at 10:24

## 2020-11-25 RX ADMIN — DOCUSATE SODIUM 100 MG: 100 CAPSULE, LIQUID FILLED ORAL at 10:24

## 2020-11-25 RX ADMIN — MORPHINE SULFATE 30 MG: 30 TABLET, FILM COATED, EXTENDED RELEASE ORAL at 01:17

## 2020-11-25 RX ADMIN — BUMETANIDE 2 MG: 0.25 INJECTION INTRAMUSCULAR; INTRAVENOUS at 10:25

## 2020-11-25 RX ADMIN — SODIUM CHLORIDE TAB 1 GM 2 G: 1 TAB at 05:26

## 2020-11-25 RX ADMIN — SODIUM CHLORIDE TAB 1 GM 2 G: 1 TAB at 01:15

## 2020-11-25 RX ADMIN — GABAPENTIN 400 MG: 400 CAPSULE ORAL at 10:24

## 2020-11-25 RX ADMIN — MORPHINE SULFATE 30 MG: 30 TABLET, FILM COATED, EXTENDED RELEASE ORAL at 17:35

## 2020-11-25 RX ADMIN — POLYETHYLENE GLYCOL 3350 17 G: 17 POWDER, FOR SOLUTION ORAL at 17:35

## 2020-11-25 RX ADMIN — SODIUM CHLORIDE TAB 1 GM 2 G: 1 TAB at 17:36

## 2020-11-25 RX ADMIN — BISACODYL 5 MG: 5 TABLET, COATED ORAL at 13:11

## 2020-11-25 RX ADMIN — SODIUM CHLORIDE TAB 1 GM 2 G: 1 TAB at 10:24

## 2020-11-25 RX ADMIN — GABAPENTIN 400 MG: 400 CAPSULE ORAL at 20:36

## 2020-11-25 RX ADMIN — MORPHINE SULFATE 20 MG: 10 SOLUTION ORAL at 05:21

## 2020-11-25 RX ADMIN — TAMSULOSIN HYDROCHLORIDE 0.8 MG: 0.4 CAPSULE ORAL at 10:24

## 2020-11-25 RX ADMIN — MORPHINE SULFATE 20 MG: 10 SOLUTION ORAL at 10:25

## 2020-11-25 RX ADMIN — NALOXEGOL OXALATE 12.5 MG: 12.5 TABLET, FILM COATED ORAL at 10:24

## 2020-11-25 RX ADMIN — MORPHINE SULFATE 20 MG: 10 SOLUTION ORAL at 23:17

## 2020-11-25 RX ADMIN — MAGNESIUM HYDROXIDE 30 ML: 2400 SUSPENSION ORAL at 13:26

## 2020-11-25 RX ADMIN — SODIUM CHLORIDE, PRESERVATIVE FREE 10 ML: 5 INJECTION INTRAVENOUS at 10:26

## 2020-11-25 RX ADMIN — BUMETANIDE 2 MG: 0.25 INJECTION INTRAMUSCULAR; INTRAVENOUS at 20:35

## 2020-11-25 RX ADMIN — DOCUSATE SODIUM 100 MG: 100 CAPSULE, LIQUID FILLED ORAL at 20:35

## 2020-11-25 RX ADMIN — CYCLOBENZAPRINE 5 MG: 10 TABLET, FILM COATED ORAL at 17:35

## 2020-11-25 RX ADMIN — MORPHINE SULFATE 20 MG: 10 SOLUTION ORAL at 17:35

## 2020-11-25 ASSESSMENT — PAIN SCALES - GENERAL
PAINLEVEL_OUTOF10: 7
PAINLEVEL_OUTOF10: 6
PAINLEVEL_OUTOF10: 7
PAINLEVEL_OUTOF10: 6
PAINLEVEL_OUTOF10: 6
PAINLEVEL_OUTOF10: 5
PAINLEVEL_OUTOF10: 7
PAINLEVEL_OUTOF10: 7

## 2020-11-25 ASSESSMENT — PAIN DESCRIPTION - PAIN TYPE: TYPE: CHRONIC PAIN

## 2020-11-25 ASSESSMENT — PAIN DESCRIPTION - ORIENTATION: ORIENTATION: LEFT

## 2020-11-25 ASSESSMENT — PAIN DESCRIPTION - DESCRIPTORS: DESCRIPTORS: SHARP

## 2020-11-25 ASSESSMENT — PAIN DESCRIPTION - LOCATION: LOCATION: BACK

## 2020-11-25 NOTE — PROGRESS NOTES
With: Friend(s)  Type of Home: House  Home Layout: Two level, Able to Live on Main level with bedroom/bathroom  Home Access: Stairs to enter with rails  Entrance Stairs - Number of Steps: 4-5 steps   Bathroom Shower/Tub: Tub/Shower unit  Bathroom Toilet: Standard  Bathroom Accessibility: Accessible    Receives Help From: Friend(s)  ADL Assistance: Independent  Homemaking Assistance: Needs assistance  Homemaking Responsibilities: Yes  Ambulation Assistance: Independent  Transfer Assistance: Independent  Active : Yes  Additional Comments: Pt was working until Oct. 20.  He has been having difficulty with fatigue and some loss of weight as he has also been having nausea along with the abdominal pain. NO AD used for ambulating. Restrictions/Precautions:  Restrictions/Precautions: General Precautions     SUBJECTIVE: RN approved session. Patient in bed upon arrival, agreed to therapy and taking walk out in hallway. PAIN: Yes, pain in R side of back, not rated. OBJECTIVE:  Bed Mobility:  Supine to Sit: Modified Independent    Transfers:  Sit to Stand: Modified Independent  Stand to Sit:Modified Independent    Ambulation:  Supervision  Distance: Community distances of >500 ft. Surface: Level Tile  Device:No Device  Gait Deviations: Forward Flexed Posture, Slow Capri, Decreased Step Length Bilaterally, Decreased Arm Swing, Decreased Gait Speed, Decreased Heel Strike Bilaterally and Narrow Base of Support    Balance:  Static Standing Balance: Modified Independent  Dynamic Standing Balance: Supervision    Exercise:  None, patient declined. Functional Outcome Measures: Not completed       ASSESSMENT:  Assessment: Patient progressing toward established goals. The patient tolerated session well, showing good progress towards goals at this time. Activity Tolerance:  Patient tolerance of  treatment: good.       Equipment Recommendations:Equipment Needed: No  Discharge Recommendations: Home with

## 2020-11-25 NOTE — PROGRESS NOTES
Assessment and Plan:        1. Uncontrolled pain due to pancreatic cancer- improved. Pain management following. 2. Hyponatremia-improving -. Nephrology following. Fluid restriction, IV Bumex, salt tablets. Thyroid, cortisol ok. 3. Leg edema- improved with wraps; suspect due to low albumin  4. Constipation- opioid induced. 5. Leukocytosis, likely reactive. 6. Severe malnutrition. Dietician following. Case discussed with Dr. Lisa Morales, needs another 24 hours of diuresis. Plans for home tomorrow. Subjective: Abdominal Pain 7/10. No CP or SOB. Now urinating without difficulty. CC:  Abdominal pain  HPI: pt with pancreas cancer, intractable pain, admitted for pain control. He is better. His sodium, however, has been progressively dropping. He has swelling in his legs.         Medications:       sodium chloride  2 g Oral TID WC    polyethylene glycol  17 g Oral Once    polyethylene glycol  17 g Oral Daily    tamsulosin  0.8 mg Oral Daily    bumetanide  2 mg Intravenous BID    nicotine  1 patch Transdermal Daily    sodium chloride flush  10 mL Intravenous 2 times per day    enoxaparin  40 mg Subcutaneous Daily    docusate sodium  100 mg Oral BID    gabapentin  400 mg Oral TID    naloxegol  12.5 mg Oral QAM    lidocaine  3 patch Transdermal Daily    morphine  30 mg Oral Q8H       Vital Signs:   BP (!) 113/56   Pulse 96   Temp 98.3 °F (36.8 °C) (Oral)   Resp 18   Ht 5' 10\" (1.778 m)   Wt 163 lb 12.8 oz (74.3 kg)   SpO2 96%   BMI 23.50 kg/m²      Intake/Output Summary (Last 24 hours) at 11/25/2020 1322  Last data filed at 11/25/2020 1310  Gross per 24 hour   Intake 1230 ml   Output 400 ml   Net 830 ml        Physical Examination: General appearance - chronically ill appearing  Mental status - alert, oriented to person, place, and time  Neck - supple, no significant adenopathy, no JVD, or carotid bruits  Chest -clear  Heart - no JVD  Abdomen - soft, nontender, nondistended, no masses or organomegaly  Neurological - alert, oriented, normal speech, no focal findings or movement disorder noted  Musculoskeletal - no muscular tenderness noted  Extremities - wraps on, swelling improved  Skin - normal coloration and turgor, no rashes, no suspicious skin lesions noted    Data: (All radiographs, tracings, PFTs, and imaging are personally viewed and interpreted unless otherwise noted).     Reviewed labs      Electronically signed by LISA Gaona CNP on 11/25/2020 at 1:22 PM

## 2020-11-25 NOTE — PROGRESS NOTES
[facility]         Renal Inpatient Follow-Up note         11/25/2020 8:09 AM    Pt Name:   Adarsh Meyer  YOB: 1955  Attending:   Galindo Smith, *    Chief Complaint : Adarsh Meyer is a 76 y.o. male being followed by nephrology for hyponatremia    Interval History :   Patient seen and examined by me. No distress  Feels okay denies any chest pain or shortness of breath still has leg swelling  Patient is urinating better     Scheduled Medications :    polyethylene glycol  17 g Oral Daily    tamsulosin  0.8 mg Oral Daily    sodium chloride  2 g Oral 4 times per day    bumetanide  2 mg Intravenous BID    nicotine  1 patch Transdermal Daily    sodium chloride flush  10 mL Intravenous 2 times per day    enoxaparin  40 mg Subcutaneous Daily    docusate sodium  100 mg Oral BID    gabapentin  400 mg Oral TID    naloxegol  12.5 mg Oral QAM    lidocaine  3 patch Transdermal Daily    morphine  30 mg Oral Q8H         Vitals :  /87   Pulse 97   Temp 98.8 °F (37.1 °C) (Oral)   Resp 16   Ht 5' 10\" (1.778 m)   Wt 163 lb 12.8 oz (74.3 kg)   SpO2 96%   BMI 23.50 kg/m²     24HR INTAKE/OUTPUT:      Intake/Output Summary (Last 24 hours) at 11/25/2020 0809  Last data filed at 11/25/2020 0745  Gross per 24 hour   Intake 1360 ml   Output --   Net 1360 ml     Last 3 weights  Wt Readings from Last 3 Encounters:   11/24/20 163 lb 12.8 oz (74.3 kg)   11/19/20 159 lb (72.1 kg)   11/13/20 159 lb 3.2 oz (72.2 kg)           Physical Exam :  General Appearance:  Well developed.  No distress  Mouth/Throat:  Oral mucosa moist  Neck:  Supple, no JVD  Lungs:  Breath sounds: clear  Heart[de-identified]  S1,S2 heard  Abdomen:  Soft, non - tender  Musculoskeletal:  Edema -noted bilaterally more prominent in the dependent areas, improving         Last 3 CBC   Recent Labs     11/24/20  0924   WBC 16.3*   RBC 3.22*   HGB 10.3*   HCT 30.4*        Last 3 CMP  Recent Labs     11/23/20  0517 11/24/20  0516  11/24/20  1710 11/24/20  2314 11/25/20  0416   *   < > 124*   < > 123* 124* 127*   K 5.1  --  4.7  --   --   --  4.0   CL 85*  --  89*  --   --   --  90*   CO2 27  --  27  --   --   --  28   BUN 14  --  12  --   --   --  13   CREATININE 0.6  --  0.6  --   --   --  0.6   CALCIUM 8.3*  --  8.1*  --   --   --  7.4*   LABALBU  --   --  2.7*  --   --   --   --     < > = values in this interval not displayed. Assessment    1. Renal -renal function appears to be stable at baseline  2. Hyponatremia-as per the urine lites patient is intravascular volume depleted, urine sodium is less than 20 and urine osmolarity is high  ? Does have bilateral lower extremity edema, also some component of hypervolemic hyponatremia  ? Sodium level is improving with diuretics and salt tablets  ?  monitor sodium level every 6 hours. Continue salt tabs 2 g 3 times daily     3. Blood pressure running well no history of essential hypertension  4. Uncontrolled pain due to pancreatic cancer on narcotics  5. Metastatic pancreatic cancer  6. Meds reviewed and discussed with him in detail      SHANA Sin D.  [facility].

## 2020-11-25 NOTE — PROGRESS NOTES
Comprehensive Nutrition Assessment    Type and Reason for Visit:  Reassess    Nutrition Recommendations/Plan:   Will change ONS to Magic Cups TID as pt. Agreeable to trial.    Encouraged po, protein intake at best efforts. Consider MVI. Nutrition Assessment:    Pt improving from a nutritional standpoint AEB improving appetite and intake; consuming % of meals. Remains at risk for further nutritional compromise r/t severe malnutrition, pain issues, catabolic illness (pancreatitic cancer) and underlying medical condition (hx prostate cancer, reported GERD). Nutrition recommendations/interventions as per above. Malnutrition Assessment:  Malnutrition Status:  Severe malnutrition    Context:  Acute Illness     Findings of the 6 clinical characteristics of malnutrition:  Energy Intake:  7 - 50% or less of estimated energy requirements for 5 or more days  Weight Loss:  No significant weight loss     Body Fat Loss:  7 - Moderate body fat loss Orbital, Triceps   Muscle Mass Loss:  1 - Mild muscle mass loss Temples (temporalis)  Fluid Accumulation:  Unable to assess     Strength:  Not Performed    Estimated Daily Nutrient Needs:  Energy (kcal):  2271-9164 kcals (28-32); Weight Used for Energy Requirements:  (75 kgm 11/21)     Protein (g):   gm (1.2-1.4);  Weight Used for Protein Requirements:  (75 kgm)          Nutrition Related Findings:  Pt. seen - reports appetite is improving; pain is improving; 1 BM noted past 24 hours; dislikes Ensure; agrees to trial Magic Cups TID; 11/25: Glucose 116, Sodium 127; Rx includes Bumex, MOM, Movantik, Colace, Zofran; states things tasting sweet      Wounds:  None       Current Nutrition Therapies:    DIET GENERAL; Daily Fluid Restriction: 1500 ml  Dietary Nutrition Supplements: Frozen Oral Supplement    Anthropometric Measures:  · Height: 5' 10\" (177.8 cm)  · Current Body Weight: 163 lb 12.8 oz (74.3 kg)(11/24, generalized edema)   · Admission Body Weight: 161 lb 2.5 oz (73.1 kg)(11/19, generalized edema)    · Usual Body Weight: (pt. unsure about UBW; per EMR: 11/10/20: 154# 6.4 oz)     · Ideal Body Weight: 166 lbs;     · BMI: 23.5  · BMI Categories: Normal Weight (BMI 22.0 to 24.9) age over 72       Nutrition Diagnosis:   · Severe malnutrition related to catabolic illness, inadequate protein-energy intake as evidenced by poor intake prior to admission, moderate loss of subcutaneous fat      Nutrition Interventions:   Food and/or Nutrient Delivery:  Continue Current Diet, Modify Oral Nutrition Supplement  Nutrition Education/Counseling:  Education initiated(11/24 Encouraged po, protein sources, small, frequent meals)   Coordination of Nutrition Care:  Continue to monitor while inpatient    Goals:  Pt. will tolerate and consume 75% or more of meals during LOS. Nutrition Monitoring and Evaluation:   Behavioral-Environmental Outcomes:  None Identified   Food/Nutrient Intake Outcomes:  Diet Advancement/Tolerance, Food and Nutrient Intake, Supplement Intake  Physical Signs/Symptoms Outcomes:  Biochemical Data, Constipation, GI Status, Nausea or Vomiting, Fluid Status or Edema, Nutrition Focused Physical Findings, Skin, Weight     Discharge Planning:     Too soon to determine     Electronically signed by Negar Mckeon RD, LD on 11/25/20 at 9:19 AM EST    Contact: [phone]

## 2020-11-25 NOTE — PROGRESS NOTES
[facility]    Patient Hina Rivera   MRN -  [id number]   Jennifer # - [de-identified]   - 1955      Date of Admission -  2020  3:11 PM  Date of evaluation -  2020  Room - -10/010-A   Hospital Day - [facility] [address], * Primary Care Physician - Claly Sands MD       Reason for Consult    Known pt to our office, on active chemotherapy regimen  [address] Problems    Diagnosis Date Noted    Hyponatremia [E87.1] 2020    Other fluid overload [E87.79]     Severe malnutrition ([address] [E43] 2020     Class: Acute    Pancreatic cancer ([address] [C25.9] 2020    Cancer associated pain [G89.3]     Generalized abdominal pain [R10.84]     Therapeutic opioid induced constipation [K59.03, Candi Miner      HPI/Subjective   Gabby Dewey is a 76 y.o. male admitted for Gabby Dewey is a 76 y.o. male admitted as a direct admit from the pain clinic on 2020 for evaluation of extreme, intractable abdominal pain secondary to metastatic pancreatic cancer. Pt on ER & intermediate release morphine dosages without much relief. KUB (+) gas distention. Pain management discussing possible celiac plexus block.       2020 - Pt resting in bed, friend at bedside. Telesitter in place. Pt states abdominal pain \"better\", \"under control now\". Pt reports occasional nausea, but \"I have medication for this that helps\". Pt reports chronic constipation that is relieved with Colace BID & Miralax daily. Pt has not been taking his Miralax, advised pt to resume typical regimen that works for him. Pt reports BLE edema, +1. Legs wrapped bilaterally with ace wraps. Pt denies H/A, dizziness, cough, SOB, CP, vomiting, diarrhea, N/T, mouth sores, fever/chills, s/s infections. 2020 - Pt resting in bed, sitting up. No family present. Telesitter in place. Pt states pain \"better controlled\".   Pt continues to endorse constipation, likely opioid induced. Pt states he uses Colace & Miralax both BID, will add 2nd dose of Miralax today as one-time order. BLE remains, legs wrapped with ace wraps. Pt denies H/A, dizziness, cough, SOB, CP, N/V, diarrhea, N/T, mouth ulcerations, fever, chills, s/s infections, abnormal bleeding.     Oncology History   Pancreatic cancer with metastasis to liver  Meds    Current Medications    sodium chloride  2 g Oral TID WC    polyethylene glycol  17 g Oral Once    polyethylene glycol  17 g Oral Daily    tamsulosin  0.8 mg Oral Daily    bumetanide  2 mg Intravenous BID    nicotine  1 patch Transdermal Daily    sodium chloride flush  10 mL Intravenous 2 times per day    enoxaparin  40 mg Subcutaneous Daily    docusate sodium  100 mg Oral BID    gabapentin  400 mg Oral TID    naloxegol  12.5 mg Oral QAM    lidocaine  3 patch Transdermal Daily    morphine  30 mg Oral Q8H     bisacodyl, bisacodyl, magnesium hydroxide, morphine **OR** morphine, nicotine polacrilex, sodium chloride flush, promethazine **OR** ondansetron, acetaminophen **OR** [DISCONTINUED] acetaminophen  IV Drips/Infusions    Past Medical History         Diagnosis Date    Anxiety     Arthritis     [facility]     Chronic skin ulcer [facility]     Hyperlipidemia     [facility]ic cancer [facility]     Prostate cancer [facility]     Sleep apnea       Past Surgical History           Procedure Laterality Date    APPENDECTOMY      BACK SURGERY      KNEE CARTILAGE SURGERY Right 1977    KNEE SURGERY Left     1980    NASAL SEPTUM SURGERY Left      Diet    DIET GENERAL; Daily Fluid Restriction: 1500 ml  Dietary Nutrition Supplements: Frozen Oral Supplement  Allergies    Anaprox [naproxen] and Vicodin [hydrocodone-acetaminophen]  Social History     Social History     Socioeconomic History    Marital status:      Spouse name: Not on file    Number of children: Not on file    Years of education: Not on file    Highest education level: Not on file   Occupational History    Not on file   Social Needs    Financial resource strain: Not on file    Food insecurity     Worry: Not on file     Inability: Not on file    Transportation needs     Medical: Not on file     Non-medical: Not on file   Tobacco Use    Smoking status: Current Every Day Smoker     Packs/day: 1.00     Years: 50.00     Pack years: 50.00     Types: Cigarettes     Start date: 5    Smokeless tobacco: Never Used   Substance and Sexual Activity    Alcohol use: Not Currently    Drug use: Not Currently    Sexual activity: Not Currently     Partners: Female   Lifestyle    Physical activity     Days per week: Not on file     Minutes per session: Not on file    Stress: Not on file   Relationships    Social connections     Talks on phone: Not on file     Gets together: Not on file     Attends Buddhism service: Not on file     Active member of club or organization: Not on file     Attends meetings of clubs or organizations: Not on file     Relationship status: Not on file    Intimate partner violence     Fear of current or ex partner: Not on file     Emotionally abused: Not on file     Physically abused: Not on file     Forced sexual activity: Not on file   Other Topics Concern    Not on file   Social History Narrative    Not on file     Family History          Problem Relation Age of Onset    Cancer Mother     Depression Mother     Heart Disease Mother      ROS     Review of Systems   Pertinent review of systems noted in HPI, all other ROS negative. Vitals     height is 5' 10\" (1.778 m) and weight is 163 lb 12.8 oz (74.3 kg). His oral temperature is 98.3 °F (36.8 °C). His blood pressure is 113/56 (abnormal) and his pulse is 96. His respiration is 18 and oxygen saturation is 96%. Exam   Physical Exam   General appearance: No apparent distress, calm and cooperative. HEENT: Pupils equal, round, and reactive to light. Conjunctivae/corneas clear. Oral mucosa dry. Neck: Supple, with full range of motion. Trachea midline. Respiratory:  Normal respiratory effort. Clear to auscultation, bilaterally. Cardiovascular: Regular rate and rhythm with normal S1/S2. Abdomen: Soft, non-tender, slightly distended with active bowel sounds. Musculoskeletal: No clubbing, cyanosis bilaterally. Limited range of motion without deformity d/t physical deconditioning. BLE edema noted, +1. Legs wrapped with ace wraps. Skin: Skin color, texture, turgor normal.    Neurologic:  Neurovascularly intact without any focal sensory/motor deficits. Cranial nerves: II-XII intact, grossly non-focal.  Psychiatric: Alert and oriented, thought content appropriate, normal insight  Capillary Refill: Brisk,< 3 seconds   Peripheral Pulses: +2 palpable, equal bilaterally        Labs   CBC  Recent Labs     11/24/20  0924   WBC 16.3*   RBC 3.22*   HGB 10.3*   HCT 30.4*   MCV 94.4*   MCH 32.0   MCHC 33.9      MPV 11.0      BMP  Recent Labs     11/23/20  0517  11/24/20  0516  11/24/20  2314 11/25/20  0416 11/25/20  1044   *   < > 124*   < > 124* 127* 128*   K 5.1  --  4.7  --   --  4.0  --    CL 85*  --  89*  --   --  90*  --    CO2 27  --  27  --   --  28  --    BUN 14  --  12  --   --  13  --    CREATININE 0.6  --  0.6  --   --  0.6  --    GLUCOSE 124*  --  114*  --   --  116*  --    MG 2.0  --   --   --   --   --   --    CALCIUM 8.3*  --  8.1*  --   --  7.4*  --     < > = values in this interval not displayed. LFT  No results for input(s): AST, ALT, ALB, BILITOT, ALKPHOS, LIPASE in the last 72 hours. Invalid input(s): AMYLASE  INR  No results for input(s): INR, PROTIME in the last 72 hours. PTT  No results for input(s): APTT in the last 72 hours. Radiology        Xr Abdomen (kub) (single Ap View)    Result Date: 11/19/2020  PROCEDURE: XR ABDOMEN (KUB) (SINGLE AP VIEW) CLINICAL INFORMATION: Severe abdominal pain, hx of pancreatic CA . COMPARISON: No prior study.

## 2020-11-25 NOTE — ADT AUTH CERT
Abdominal Pain, Undiagnosed - Care Day 5 (11/23/2020) by Wan Marvin RN         Review Entered  Review Status    11/25/2020 16:14  Completed        Criteria Review       Care Day: 5 Care Date: 11/23/2020 Level of Care: Inpatient Floor    Guideline Day 2    Clinical Status    (X) * Hemodynamic stability    11/25/2020 4:14 PM EST by Gina Sandoval      84   118/58    ( ) * Pain absent or managed    11/25/2020 4:14 PM EST by Gina Sandoval      pt says much improved.  Now 6/10    ( ) * Etiology or finding requiring inpatient treatment absent    11/25/2020 4:14 PM EST by Gina Sandoval      Uncontrolled pain due to pancreatic cancer- pt says much improved.  Now 6/10    (X) * Liquid or advanced diet tolerated    11/25/2020 4:14 PM EST by Gina Sandoval      general diet    ( ) * Discharge plans and education understood    Activity    (X) * Ambulatory or acceptable for next level of care [D]    11/25/2020 4:14 PM EST by Gina Sandoval      acceptable for next level of care    Routes    (X) * Oral hydration, medications, and diet    11/25/2020 4:14 PM EST by Shyla Mock      po intake    (X) Liquid or advanced diet    11/25/2020 4:14 PM EST by Gina Sandoval      general diet    * Milestone    Additional Notes    11/23        Vs- 98.2 (36.8)   16   84   118/58  94% RA       Labs    11/23/2020 05:17    Sodium: 121 (L)    Potassium: 5.1    Chloride: 85 (L)    CO2: 27    BUN: 14    Creatinine: 0.6    Anion Gap: 9.0    Est, Glom Filt Rate: >90    Magnesium: 2.0    Glucose: 124 (H)    Calcium: 8.3 (L)    Uric Acid: 3.1 (L)    Osmolality: 260 (L)    Cortisol: 18.10    Cortisol Collection Info: AM Specimen    T4 Free: 1.76       11/23/2020 13:01    Sodium, Ur: < 20       11/23/2020 13:02    Osmolality, Ur: 219       11/23/2020 19:17    Sodium: 124 (L)          Per IM    Assessment and Plan:          1. Uncontrolled pain due to pancreatic cancer- pt says much improved.  Now 6/10    2.  Hyponatremia- sodium continues to drop- prior urine studies suggested sodium depletion; will hold discharge. Nasima Driscoll has ascites which may be a factor. Per Nephrology    Assessment    1. Renal -renal function appears to be stable at baseline    2. Hyponatremia-as per the urine lites patient is intravascular volume depleted, urine sodium is less than 20 and urine osmolarity is high    Does have bilateral lower extremity edema be due to hypervolemic hyponatremia    Stat sodium level low currently on IV fluids okay to continue    Based on the sodium level I might stop the IV fluids and consider salt tablets/diuretics         3. Blood pressure running well no history of essential hypertension    4. Uncontrolled pain due to pancreatic cancer on narcotics    5. Metastatic pancreatic cancer    6.  Meds reviewed and discussed with him in detail          Per Oncology    11/23/2020 - Pt resting in bed, friend at bedside. Usman Mathis in place.  Pt states abdominal pain \"better\", \"under control now\".  Pt reports occasional nausea, but \"I have medication for this that helps\".  Pt reports chronic constipation that is relieved with Colace BID & Miralax daily.  Pt has not been taking his Miralax, advised pt to resume typical regimen that works for him.  Pt reports BLE edema, +1.  Legs wrapped bilaterally with ace wraps.  Pt denies H/A, dizziness, cough, SOB, CP, vomiting, diarrhea, N/T, mouth sores, fever/chills, s/s infections.            Scheduled Medications    0.9 % sodium chloride infusion  @ 75 ml/h       · nicotine 1 patch Transdermal Daily    · enoxaparin 40 mg Subcutaneous Daily    · docusate sodium 100 mg Oral BID    · gabapentin 400 mg Oral TID    · tamsulosin 0.4 mg Oral Daily    · naloxegol 12.5 mg Oral QAM    · lidocaine 3 patch Transdermal Daily    · morphine 30 mg Oral Q8H          PRN Meds    morphine 10 MG/5ML solution 20 mg  x2    polyethylene glycol (GLYCOLAX) packet 17 g   x1        Abdominal Pain, Undiagnosed - Care Day 3 (11/21/2020) by Elena Lake RN         Review Entered  Review Status    11/25/2020 16:13  Completed        Criteria Review       Care Day: 3 Care Date: 11/21/2020 Level of Care: Inpatient Floor    Guideline Day 2    Clinical Status    (X) * Hemodynamic stability    11/25/2020 4:12 PM EST by Shyla Mock      /61   Pulse 86    ( ) * Pain absent or managed    11/25/2020 4:12 PM EST by Palmer Bellingham      Still with pain but better    ( ) * Etiology or finding requiring inpatient treatment absent    11/25/2020 4:12 PM EST by GTxcel      Severe uncontrollable pain associated with metastatic pancreatic cancer    (X) * Liquid or advanced diet tolerated    11/25/2020 4:12 PM EST by GTxcel      general diet    ( ) * Discharge plans and education understood    Activity    (X) * Ambulatory or acceptable for next level of care [D]    11/25/2020 4:12 PM EST by GTxcel      acceptable for next level of care    Routes    (X) * Oral hydration, medications, and diet    (X) Liquid or advanced diet    11/25/2020 4:12 PM EST by GTxcel      DIET GENERAL    * Milestone    Additional Notes    11/21        Vs- /61   Pulse 86   Temp 98.3 °F (36.8 °C) (Oral)   Resp 18   Ht 5' 10\" (1.778 m)   Wt 164 lb 11.2 oz (74.7 kg)   SpO2 96%   BMI 23.63 kg/m²       No Labs          Per IM     Severe uncontrollable pain associated with metastatic pancreatic cancer    -direct admission    -Meds adjusted by pain management         Pancreatic cancer with mets to liver    -Follows with Dr. Carolina Jeffrey as outpatient, consulted    -Palliative care consulted for CODE STATUS and goals of care discussion    -Spiritual care consult as well to discuss POA and living will         Opiate-induced constipation    -Is on significant amounts of morphine at home as above    - KUB: nonspecific gas pattern    -Continue Colace    - Movantik    -Maalox as needed         Hypotension    -Dehydration on admission    -Better           Poor oral intake, malnutrition    -Secondary to metastatic cancer and chronic pain    -Dietitian consult         Physical deconditioning    -PT/OT         History of prostate cancer    -Per history         DEE    -Continue CPAP if he is currently using         Hyperlipidemia    -Does not appear to currently be on a statin however considering his cancer prognosis, no indications to start a statin         Osteoarthritis    -Pain management as needed         Anxiety    -Noted per chart review/history    -Does not appear to be any home meds to manage anxiety however will continue to assess needs and may start PRN medication for anxiety if indicated         Tobacco abuse    -Per chart review, 1 pack/day smoker x 50 years    -Nicotine patches            Scheduled Medications    · enoxaparin 40 mg Subcutaneous Daily    · docusate sodium 100 mg Oral BID    · gabapentin 400 mg Oral TID    · tamsulosin 0.4 mg Oral Daily    · naloxegol 12.5 mg Oral QAM    · lidocaine 3 patch Transdermal Daily    · morphine 30 mg Oral Q8H          PRN Meds    morphine 10 MG/5ML solution 20 mg  x3    polyethylene glycol (GLYCOLAX) packet 17 g  x1

## 2020-11-25 NOTE — PROGRESS NOTES
[facility]  Occupational Therapy  Daily Note  Time:    Time In: 8756  Time Out: 1234  Timed Code Treatment Minutes: 24 Minutes  Minutes: 24          Date: 2020  Patient Name: Gabby Dewey,   Gender: male      Room: [facility]A  MRN: [id number]  : 1955  (76 y.o.)  Referring Practitioner: Dr. Carlos Branham MD  Diagnosis: Pancreatic Cancer  Additional Pertinent Hx: Pt medical history of prostate cancer (2015 s/p chemoradiation) and recent diagnosis of pancreatic cancer. He initially experienced abdominal pain several months ago and upon evaluation with a CT abdomen pelvis on 10/21/2020 revealed a necrotic mass involving the neck, proximal body of the pancreas. There is also some mass involving the stomach concerning for malignancy. A biopsy done on 10/27/20 revealed metastatic pancreatic cancer. He was evaluated by the oncology team on 11/10/2020 in which he underwent his first treatment of chemotherapy. In addition, there were plans to admit the patient directly for pain management however due to the ED being overcrowded he was unable to be admitted. In addition, they increased his long-acting morphine to 30/30/15 mg in 8-hour intervals with 15 mg morphine immediate release for breakthrough pain. He states he has been taking about 4 of the 50 mg immediate release tablets per day. He states that despite this increase his pain is still excruciating 10/10. He is unable to even sit upright due to the pain being so severe. The worst of his pain is on the right side of the abdomen but will radiate through both of the lower parts of his abdomen and up the entire abdomen. He also endorses severe pain radiating around to his back. He states that he has very poor appetite with associated nausea/vomiting.   In addition, he notes that he has been able to pass bowel movements for a few days and has been only having very loose doing community mobility and working in the yard. Short term goal 2: Pt will complete BUE moderate/high resistance HEP while following any handout needed and taking short rest breaks to increase his strength and endurance for ease of doing ADLs and yardwork. Short term goal 3: Pt will complete BADLs with supervision while using any AE needed to increase his safety and help with pain management for returning to being independent at home. Short term goal 4: Pt will complete standing ADLs for over 8 minute duration with cues for back protection to increase his endurance and help manage his pain for ease of showering or dressing. Following session, patient left in safe position with all fall risk precautions in place.

## 2020-11-26 VITALS
WEIGHT: 163.8 LBS | HEART RATE: 92 BPM | OXYGEN SATURATION: 96 % | TEMPERATURE: 98.9 F | DIASTOLIC BLOOD PRESSURE: 65 MMHG | RESPIRATION RATE: 18 BRPM | BODY MASS INDEX: 23.45 KG/M2 | SYSTOLIC BLOOD PRESSURE: 144 MMHG | HEIGHT: 70 IN

## 2020-11-26 LAB
ALBUMIN SERPL-MCNC: 2.8 G/DL (ref 3.5–5.1)
ALP BLD-CCNC: 484 U/L (ref 38–126)
ALT SERPL-CCNC: 52 U/L (ref 11–66)
ANION GAP SERPL CALCULATED.3IONS-SCNC: 11 MEQ/L (ref 8–16)
AST SERPL-CCNC: 43 U/L (ref 5–40)
BASOPHILS # BLD: 0.6 %
BASOPHILS ABSOLUTE: 0.1 THOU/MM3 (ref 0–0.1)
BILIRUB SERPL-MCNC: 4.1 MG/DL (ref 0.3–1.2)
BUN BLDV-MCNC: 19 MG/DL (ref 7–22)
CALCIUM SERPL-MCNC: 7.6 MG/DL (ref 8.5–10.5)
CHLORIDE BLD-SCNC: 89 MEQ/L (ref 98–111)
CO2: 27 MEQ/L (ref 23–33)
CREAT SERPL-MCNC: 0.7 MG/DL (ref 0.4–1.2)
EOSINOPHIL # BLD: 13 %
EOSINOPHILS ABSOLUTE: 2 THOU/MM3 (ref 0–0.4)
ERYTHROCYTE [DISTWIDTH] IN BLOOD BY AUTOMATED COUNT: 14.6 % (ref 11.5–14.5)
ERYTHROCYTE [DISTWIDTH] IN BLOOD BY AUTOMATED COUNT: 50.9 FL (ref 35–45)
FERRITIN: 991 NG/ML (ref 22–322)
FOLATE: 12.5 NG/ML (ref 4.8–24.2)
GFR SERPL CREATININE-BSD FRML MDRD: > 90 ML/MIN/1.73M2
GLUCOSE BLD-MCNC: 121 MG/DL (ref 70–108)
HCT VFR BLD CALC: 29.7 % (ref 42–52)
HEMOGLOBIN: 9.8 GM/DL (ref 14–18)
IMMATURE GRANS (ABS): 0.36 THOU/MM3 (ref 0–0.07)
IMMATURE GRANULOCYTES: 2.3 %
IRON SATURATION: 15 % (ref 20–50)
IRON: 31 UG/DL (ref 65–195)
LYMPHOCYTES # BLD: 6 %
LYMPHOCYTES ABSOLUTE: 0.9 THOU/MM3 (ref 1–4.8)
MCH RBC QN AUTO: 31.8 PG (ref 26–33)
MCHC RBC AUTO-ENTMCNC: 33 GM/DL (ref 32.2–35.5)
MCV RBC AUTO: 96.4 FL (ref 80–94)
MONOCYTES # BLD: 12.9 %
MONOCYTES ABSOLUTE: 2 THOU/MM3 (ref 0.4–1.3)
NUCLEATED RED BLOOD CELLS: 0 /100 WBC
PLATELET # BLD: 560 THOU/MM3 (ref 130–400)
PLATELET ESTIMATE: ABNORMAL
PMV BLD AUTO: 10.3 FL (ref 9.4–12.4)
POTASSIUM SERPL-SCNC: 4.1 MEQ/L (ref 3.5–5.2)
RBC # BLD: 3.08 MILL/MM3 (ref 4.7–6.1)
SCAN OF BLOOD SMEAR: NORMAL
SEG NEUTROPHILS: 65.2 %
SEGMENTED NEUTROPHILS ABSOLUTE COUNT: 10.1 THOU/MM3 (ref 1.8–7.7)
SODIUM BLD-SCNC: 127 MEQ/L (ref 135–145)
SODIUM BLD-SCNC: 130 MEQ/L (ref 135–145)
TOTAL IRON BINDING CAPACITY: 203 UG/DL (ref 171–450)
TOTAL PROTEIN: 5.2 G/DL (ref 6.1–8)
VITAMIN B-12: > 2000 PG/ML (ref 211–911)
WBC # BLD: 15.5 THOU/MM3 (ref 4.8–10.8)

## 2020-11-26 PROCEDURE — 2500000003 HC RX 250 WO HCPCS: Performed by: INTERNAL MEDICINE

## 2020-11-26 PROCEDURE — 80053 COMPREHEN METABOLIC PANEL: CPT

## 2020-11-26 PROCEDURE — 82728 ASSAY OF FERRITIN: CPT

## 2020-11-26 PROCEDURE — 85025 COMPLETE CBC W/AUTO DIFF WBC: CPT

## 2020-11-26 PROCEDURE — 36415 COLL VENOUS BLD VENIPUNCTURE: CPT

## 2020-11-26 PROCEDURE — 6370000000 HC RX 637 (ALT 250 FOR IP): Performed by: INTERNAL MEDICINE

## 2020-11-26 PROCEDURE — 6370000000 HC RX 637 (ALT 250 FOR IP): Performed by: STUDENT IN AN ORGANIZED HEALTH CARE EDUCATION/TRAINING PROGRAM

## 2020-11-26 PROCEDURE — 82607 VITAMIN B-12: CPT

## 2020-11-26 PROCEDURE — 83550 IRON BINDING TEST: CPT

## 2020-11-26 PROCEDURE — 6370000000 HC RX 637 (ALT 250 FOR IP): Performed by: NURSE PRACTITIONER

## 2020-11-26 PROCEDURE — 2580000003 HC RX 258: Performed by: STUDENT IN AN ORGANIZED HEALTH CARE EDUCATION/TRAINING PROGRAM

## 2020-11-26 PROCEDURE — 83540 ASSAY OF IRON: CPT

## 2020-11-26 PROCEDURE — 99239 HOSP IP/OBS DSCHRG MGMT >30: CPT | Performed by: NURSE PRACTITIONER

## 2020-11-26 PROCEDURE — 82746 ASSAY OF FOLIC ACID SERUM: CPT

## 2020-11-26 PROCEDURE — 84295 ASSAY OF SERUM SODIUM: CPT

## 2020-11-26 PROCEDURE — 99232 SBSQ HOSP IP/OBS MODERATE 35: CPT | Performed by: INTERNAL MEDICINE

## 2020-11-26 RX ORDER — BUMETANIDE 0.25 MG/ML
1 INJECTION, SOLUTION INTRAMUSCULAR; INTRAVENOUS 2 TIMES DAILY
Status: DISCONTINUED | OUTPATIENT
Start: 2020-11-26 | End: 2020-11-26 | Stop reason: HOSPADM

## 2020-11-26 RX ORDER — SODIUM CHLORIDE 1000 MG
2 TABLET, SOLUBLE MISCELLANEOUS
Qty: 90 TABLET | Refills: 0 | Status: ON HOLD | OUTPATIENT
Start: 2020-11-26 | End: 2020-12-14 | Stop reason: HOSPADM

## 2020-11-26 RX ADMIN — CYCLOBENZAPRINE 5 MG: 10 TABLET, FILM COATED ORAL at 01:22

## 2020-11-26 RX ADMIN — DOCUSATE SODIUM 100 MG: 100 CAPSULE, LIQUID FILLED ORAL at 08:01

## 2020-11-26 RX ADMIN — SODIUM CHLORIDE TAB 1 GM 2 G: 1 TAB at 11:20

## 2020-11-26 RX ADMIN — MORPHINE SULFATE 30 MG: 30 TABLET, FILM COATED, EXTENDED RELEASE ORAL at 01:22

## 2020-11-26 RX ADMIN — BUMETANIDE 2 MG: 0.25 INJECTION INTRAMUSCULAR; INTRAVENOUS at 08:00

## 2020-11-26 RX ADMIN — SODIUM CHLORIDE, PRESERVATIVE FREE 10 ML: 5 INJECTION INTRAVENOUS at 08:00

## 2020-11-26 RX ADMIN — NALOXEGOL OXALATE 12.5 MG: 12.5 TABLET, FILM COATED ORAL at 08:04

## 2020-11-26 RX ADMIN — SODIUM CHLORIDE TAB 1 GM 2 G: 1 TAB at 07:59

## 2020-11-26 RX ADMIN — TAMSULOSIN HYDROCHLORIDE 0.8 MG: 0.4 CAPSULE ORAL at 07:59

## 2020-11-26 RX ADMIN — GABAPENTIN 400 MG: 400 CAPSULE ORAL at 08:00

## 2020-11-26 RX ADMIN — MORPHINE SULFATE 20 MG: 10 SOLUTION ORAL at 11:19

## 2020-11-26 RX ADMIN — GABAPENTIN 400 MG: 400 CAPSULE ORAL at 12:30

## 2020-11-26 RX ADMIN — POLYETHYLENE GLYCOL 3350 17 G: 17 POWDER, FOR SOLUTION ORAL at 08:01

## 2020-11-26 RX ADMIN — NICOTINE POLACRILEX 2 MG: 2 LOZENGE ORAL at 08:01

## 2020-11-26 RX ADMIN — MORPHINE SULFATE 30 MG: 30 TABLET, FILM COATED, EXTENDED RELEASE ORAL at 08:01

## 2020-11-26 ASSESSMENT — PAIN SCALES - GENERAL
PAINLEVEL_OUTOF10: 5
PAINLEVEL_OUTOF10: 8

## 2020-11-26 NOTE — DISCHARGE SUMMARY
Hospital Medicine Discharge Summary      Patient Identification:   Hodan Fallon   : 1955  MRN: [id number]   Account: [de-identified]      Patient's PCP: Olga Dobson MD    Admit Date: 2020     Discharge Date:   2020    Admitting Physician: Meredith Dobson MD     Discharge Physician: Reggie Grier, APRN - CNP     Discharge Diagnoses and Hospital Course:    HX of pancreatic CA. Presented to the ED with severe uncontrolled abdominal pain. See H&P for details. 1. Uncontrolled pain due to pancreatic cancer- improved. Pain management following, recs given and RX signed previously by Dr. Bre Cr. .   2. Hyponatremia-improving -Treated with fluid restriction, IV Bumex, salt tablets. Thyroid, cortisol ok.  today  Nephrology ok with discharge. BMP in 2 weeks and office visit at that time. 3. Leg edema- improved with wraps and diureses; suspect due to low albumin. 4. Constipation- opioid induced. 5. Leukocytosis, likely reactive. 6. Severe malnutrition. Dietician following. 7. Thrombocytosis. 8. Macrocytic anemia.      Today pain is tolerable and patient is hopeful for discharge. Repeat NA level is acceptable to discharge per Dr. Morena Yeung. Home with salt tablets and adjustment in his pain regimen. He is to follow up with Dr. Tom Ernst first thing next week and Dr. Lila Hannah in 2 weeks with repeat BMP. The patient was seen and examined on day of discharge and this discharge summary is in conjunction with any daily progress note from day of discharge.       Exam:     Vitals:  Vitals:    20 1015 20 1541 20 2030 20 0758   BP: (!) 113/56 (!) 110/56 127/61 (!) 144/65   Pulse: 96 84 79 92   Resp: 18 18 18 18   Temp: 98.3 °F (36.8 °C) 98.2 °F (36.8 °C) 98.2 °F (36.8 °C) 98.9 °F (37.2 °C)   TempSrc: Oral Oral Oral Oral   SpO2: 96% 96% 99% 96%   Weight:       Height:         Weight: Weight: 163 lb 12.8 oz (74.3 kg)     24 hour intake/output:    Intake/Output Summary (Last 24 hours) at 11/26/2020 1201  Last data filed at 11/26/2020 7753  Gross per 24 hour   Intake 930 ml   Output 1975 ml   Net -1045 ml         General appearance:  Chronically ill appearing. No apparent distress, appears stated age and cooperative. HEENT:  Normal cephalic, atraumatic without obvious deformity. Pupils equal, round, and reactive to light. Extra ocular muscles intact. Conjunctivae/corneas clear. Neck: Supple, with full range of motion. No jugular venous distention. Trachea midline. Respiratory:  Normal respiratory effort. Clear to auscultation, bilaterally without Rales/Wheezes/Rhonchi. Cardiovascular:  Regular rate and rhythm with normal S1/S2 without murmurs, rubs or gallops. Abdomen: Soft, obese, non-tender, non-distended with normal bowel sounds. Musculoskeletal:  No clubbing, cyanosis or edema bilaterally. Full range of motion without deformity. Skin: Skin color, texture, turgor normal.  No rashes or lesions. Neurologic:  Neurovascularly intact without any focal sensory/motor deficits. Cranial nerves: II-XII intact, grossly non-focal.  Psychiatric:  Alert and oriented, thought content appropriate, normal insight  Capillary Refill: Brisk,< 3 seconds   Peripheral Pulses: +2 palpable, equal bilaterally       Labs: For convenience and continuity at follow-up the following most recent labs are provided:      CBC:    Lab Results   Component Value Date    WBC 15.5 11/26/2020    HGB 9.8 11/26/2020    HCT 29.7 11/26/2020     11/26/2020       Renal:    Lab Results   Component Value Date     11/26/2020    K 4.1 11/26/2020    K 4.7 11/20/2020    CL 89 11/26/2020    CO2 27 11/26/2020    BUN 19 11/26/2020    CREATININE 0.7 11/26/2020    CALCIUM 7.6 11/26/2020    PHOS 3.7 11/19/2020         Significant Diagnostic Studies    Radiology:   XR ABDOMEN (KUB) (SINGLE AP VIEW)   Final Result   Nonspecific gas pattern.             **This report has been created using voice recognition daily             ondansetron (ZOFRAN) 4 MG tablet  Take 1 tablet by mouth daily for 7 days Take 4 mg by mouth daily             polyethylene glycol (GLYCOLAX) 17 GM/SCOOP powder  Take 17 g by mouth daily             sodium chloride 1 g tablet  Take 2 tablets by mouth 3 times daily (with meals)             tamsulosin (FLOMAX) 0.4 MG capsule  Take 1 capsule by mouth daily                 Time Spent on discharge is more than 35 minutes in the examination, evaluation, counseling and review of medications and discharge plan. Signed: Thank you Latrice Muniz MD for the opportunity to be involved in this patient's care.     Electronically signed by LISA Powell CNP on 11/26/2020 at 12:01 PM

## 2020-11-26 NOTE — PROGRESS NOTES
0.6  --  0.6  --   --  0.7  --    GLUCOSE 114*  --  116*  --   --  121*  --    CALCIUM 8.1*  --  7.4*  --   --  7.6*  --    LABGLOM >90  --  >90  --   --  >90  --     < > = values in this interval not displayed. Troponin: No results for input(s): TROPONINI in the last 72 hours. BNP: No results for input(s): BNP in the last 72 hours. INR: No results for input(s): INR in the last 72 hours. Lipids: No results for input(s): CHOL, LDLDIRECT, TRIG, HDL, AMYLASE, LIPASE in the last 72 hours. Liver:   Recent Labs     11/26/20  0352   AST 43*   ALT 52   ALKPHOS 484*   PROT 5.2*   LABALBU 2.8*   BILITOT 4.1*     Iron:    Recent Labs     11/26/20  0352   FERRITIN 991*     XR ABDOMEN (KUB) (SINGLE AP VIEW)   Final Result   Nonspecific gas pattern. **This report has been created using voice recognition software. It may contain minor errors which are inherent in voice recognition technology. **      Final report electronically signed by Dr. George Rebolledo on 11/19/2020 4:51 PM            Objective:   Vitals: BP (!) 144/65   Pulse 92   Temp 98.9 °F (37.2 °C) (Oral)   Resp 18   Ht 5' 10\" (1.778 m)   Wt 163 lb 12.8 oz (74.3 kg)   SpO2 96%   BMI 23.50 kg/m²    Wt Readings from Last 3 Encounters:   11/24/20 163 lb 12.8 oz (74.3 kg)   11/19/20 159 lb (72.1 kg)   11/13/20 159 lb 3.2 oz (72.2 kg)      24HR INTAKE/OUTPUT:      Intake/Output Summary (Last 24 hours) at 11/26/2020 1119  Last data filed at 11/26/2020 5734  Gross per 24 hour   Intake 930 ml   Output 1975 ml   Net -1045 ml       Constitutional:  Alert, awake, no apparent distress   Skin:normal with no rash or lesions. HEENT:Pupils are reactive . Throat is clear . Oral mucosa is moist   Neck:supple with no thyromegaly or carotid bruit  Cardiovascular:  S1, S2 without murmur or rubs   Respiratory:  Clear to ausculation without wheezes, rhonchi or rales. Abdomen: +bs, soft, no tenderness to palpation and no palpable mass. No abdominal bruit   Ext: 2+ bilateral LE edema  Musculoskeletal:Intact  Neuro:Alert and awake. Well oriented  with no focal deficit. Speech is normal      Electronically signed by Dakota Mix MD on 11/26/2020 at 11:19 AM

## 2020-11-27 RX ORDER — MORPHINE SULFATE 15 MG/1
15 TABLET ORAL EVERY 4 HOURS PRN
Qty: 21 TABLET | Refills: 0 | Status: SHIPPED | OUTPATIENT
Start: 2020-11-27 | End: 2020-12-03 | Stop reason: SDUPTHER

## 2020-11-29 ASSESSMENT — ENCOUNTER SYMPTOMS
CONSTIPATION: 1
NAUSEA: 1
ABDOMINAL DISTENTION: 1

## 2020-12-02 ENCOUNTER — HOSPITAL ENCOUNTER (OUTPATIENT)
Dept: INFUSION THERAPY | Age: 65
Discharge: HOME | End: 2020-12-02
Payer: COMMERCIAL

## 2020-12-02 ENCOUNTER — HOSPITAL ENCOUNTER (OUTPATIENT)
Dept: INTERVENTIONAL RADIOLOGY/VASCULAR | Age: 65
Discharge: HOME | End: 2020-12-02
Payer: COMMERCIAL

## 2020-12-02 ENCOUNTER — OFFICE VISIT (OUTPATIENT)
Dept: ONCOLOGY | Age: 65
End: 2020-12-02
Payer: COMMERCIAL

## 2020-12-02 VITALS
HEART RATE: 90 BPM | TEMPERATURE: 97.5 F | HEIGHT: 70 IN | BODY MASS INDEX: 24.88 KG/M2 | RESPIRATION RATE: 18 BRPM | SYSTOLIC BLOOD PRESSURE: 141 MMHG | OXYGEN SATURATION: 97 % | DIASTOLIC BLOOD PRESSURE: 71 MMHG | WEIGHT: 173.8 LBS

## 2020-12-02 VITALS
HEART RATE: 111 BPM | BODY MASS INDEX: 24.88 KG/M2 | WEIGHT: 173.8 LBS | DIASTOLIC BLOOD PRESSURE: 58 MMHG | SYSTOLIC BLOOD PRESSURE: 110 MMHG | RESPIRATION RATE: 18 BRPM | TEMPERATURE: 98.7 F | HEIGHT: 70 IN | OXYGEN SATURATION: 99 %

## 2020-12-02 DIAGNOSIS — C25.9 PANCREATIC CANCER METASTASIZED TO LIVER (HCC): Primary | ICD-10-CM

## 2020-12-02 DIAGNOSIS — C78.7 PANCREATIC CANCER METASTASIZED TO LIVER (HCC): Primary | ICD-10-CM

## 2020-12-02 DIAGNOSIS — Z51.11 CHEMOTHERAPY MANAGEMENT, ENCOUNTER FOR: ICD-10-CM

## 2020-12-02 LAB
ABSOLUTE IMMATURE GRANULOCYTE: 0.11 THOU/MM3 (ref 0–0.07)
ALBUMIN SERPL-MCNC: 2.5 G/DL (ref 3.5–5.1)
ALP BLD-CCNC: 500 U/L (ref 38–126)
ALT SERPL-CCNC: 52 U/L (ref 11–66)
AST SERPL-CCNC: 67 U/L (ref 5–40)
BASINOPHIL, AUTOMATED: 1 % (ref 0–3)
BASOPHILS ABSOLUTE: 0.2 THOU/MM3 (ref 0–0.1)
BILIRUB SERPL-MCNC: 4.5 MG/DL (ref 0.3–1.2)
BILIRUBIN DIRECT: 3.6 MG/DL (ref 0–0.3)
BUN, WHOLE BLOOD: 14 MG/DL (ref 8–26)
CHLORIDE, WHOLE BLOOD: 99 MEQ/L (ref 98–109)
CREATININE, WHOLE BLOOD: 0.7 MG/DL (ref 0.5–1.2)
EOSINOPHILS ABSOLUTE: 1.3 THOU/MM3 (ref 0–0.4)
EOSINOPHILS RELATIVE PERCENT: 10 % (ref 0–4)
GFR, ESTIMATED: > 90 ML/MIN/1.73M2
GLUCOSE, WHOLE BLOOD: 143 MG/DL (ref 70–108)
HCT VFR BLD CALC: 34.3 % (ref 42–52)
HEMOGLOBIN: 11.1 GM/DL (ref 14–18)
IMMATURE GRANULOCYTES: 1 %
IONIZED CALCIUM, WHOLE BLOOD: 1.12 MMOL/L (ref 1.12–1.32)
LYMPHOCYTES # BLD: 9 % (ref 15–47)
LYMPHOCYTES ABSOLUTE: 1.2 THOU/MM3 (ref 1–4.8)
MCH RBC QN AUTO: 31.8 PG (ref 26–33)
MCHC RBC AUTO-ENTMCNC: 32.4 GM/DL (ref 32.2–35.5)
MCV RBC AUTO: 98 FL (ref 80–94)
MONOCYTES ABSOLUTE: 1.2 THOU/MM3 (ref 0.4–1.3)
MONOCYTES: 9 % (ref 0–12)
PDW BLD-RTO: 15.3 % (ref 11.5–14.5)
PLATELET # BLD: 498 THOU/MM3 (ref 130–400)
PMV BLD AUTO: 9.2 FL (ref 9.4–12.4)
POTASSIUM, WHOLE BLOOD: 3.8 MEQ/L (ref 3.5–4.9)
RBC # BLD: 3.49 MILL/MM3 (ref 4.7–6.1)
SEG NEUTROPHILS: 71 % (ref 43–75)
SEGMENTED NEUTROPHILS ABSOLUTE COUNT: 10 THOU/MM3 (ref 1.8–7.7)
SODIUM, WHOLE BLOOD: 135 MEQ/L (ref 138–146)
TOTAL CO2, WHOLE BLOOD: 27 MEQ/L (ref 23–33)
TOTAL PROTEIN: 5.2 G/DL (ref 6.1–8)
WBC # BLD: 14.1 THOU/MM3 (ref 4.8–10.8)

## 2020-12-02 PROCEDURE — 80076 HEPATIC FUNCTION PANEL: CPT

## 2020-12-02 PROCEDURE — 96375 TX/PRO/DX INJ NEW DRUG ADDON: CPT

## 2020-12-02 PROCEDURE — 4004F PT TOBACCO SCREEN RCVD TLK: CPT | Performed by: INTERNAL MEDICINE

## 2020-12-02 PROCEDURE — 96417 CHEMO IV INFUS EACH ADDL SEQ: CPT

## 2020-12-02 PROCEDURE — 6360000002 HC RX W HCPCS: Performed by: INTERNAL MEDICINE

## 2020-12-02 PROCEDURE — G8420 CALC BMI NORM PARAMETERS: HCPCS | Performed by: INTERNAL MEDICINE

## 2020-12-02 PROCEDURE — 80047 BASIC METABLC PNL IONIZED CA: CPT

## 2020-12-02 PROCEDURE — G8484 FLU IMMUNIZE NO ADMIN: HCPCS | Performed by: INTERNAL MEDICINE

## 2020-12-02 PROCEDURE — 99212 OFFICE O/P EST SF 10 MIN: CPT

## 2020-12-02 PROCEDURE — 4040F PNEUMOC VAC/ADMIN/RCVD: CPT | Performed by: INTERNAL MEDICINE

## 2020-12-02 PROCEDURE — 36415 COLL VENOUS BLD VENIPUNCTURE: CPT

## 2020-12-02 PROCEDURE — 99215 OFFICE O/P EST HI 40 MIN: CPT | Performed by: INTERNAL MEDICINE

## 2020-12-02 PROCEDURE — 96413 CHEMO IV INFUSION 1 HR: CPT

## 2020-12-02 PROCEDURE — 2580000003 HC RX 258: Performed by: INTERNAL MEDICINE

## 2020-12-02 PROCEDURE — G8427 DOCREV CUR MEDS BY ELIG CLIN: HCPCS | Performed by: INTERNAL MEDICINE

## 2020-12-02 PROCEDURE — 85025 COMPLETE CBC W/AUTO DIFF WBC: CPT

## 2020-12-02 PROCEDURE — 93970 EXTREMITY STUDY: CPT

## 2020-12-02 PROCEDURE — 1111F DSCHRG MED/CURRENT MED MERGE: CPT | Performed by: INTERNAL MEDICINE

## 2020-12-02 PROCEDURE — 3017F COLORECTAL CA SCREEN DOC REV: CPT | Performed by: INTERNAL MEDICINE

## 2020-12-02 PROCEDURE — 1123F ACP DISCUSS/DSCN MKR DOCD: CPT | Performed by: INTERNAL MEDICINE

## 2020-12-02 RX ORDER — DEXAMETHASONE SODIUM PHOSPHATE 4 MG/ML
8 INJECTION, SOLUTION INTRA-ARTICULAR; INTRALESIONAL; INTRAMUSCULAR; INTRAVENOUS; SOFT TISSUE ONCE
Status: CANCELLED | OUTPATIENT
Start: 2020-12-02

## 2020-12-02 RX ORDER — SODIUM CHLORIDE 0.9 % (FLUSH) 0.9 %
5 SYRINGE (ML) INJECTION PRN
Status: CANCELLED | OUTPATIENT
Start: 2020-12-02

## 2020-12-02 RX ORDER — METHYLPREDNISOLONE SODIUM SUCCINATE 125 MG/2ML
125 INJECTION, POWDER, LYOPHILIZED, FOR SOLUTION INTRAMUSCULAR; INTRAVENOUS ONCE
Status: CANCELLED | OUTPATIENT
Start: 2020-12-02

## 2020-12-02 RX ORDER — PACLITAXEL 100 MG/20ML
125 INJECTION, POWDER, LYOPHILIZED, FOR SUSPENSION INTRAVENOUS ONCE
Status: CANCELLED | OUTPATIENT
Start: 2020-12-02

## 2020-12-02 RX ORDER — DIAPER,BRIEF,INFANT-TODD,DISP
EACH MISCELLANEOUS
Qty: 1 TUBE | Refills: 1 | Status: ON HOLD | OUTPATIENT
Start: 2020-12-02 | End: 2020-12-14 | Stop reason: HOSPADM

## 2020-12-02 RX ORDER — SODIUM CHLORIDE 9 MG/ML
20 INJECTION, SOLUTION INTRAVENOUS ONCE
Status: CANCELLED | OUTPATIENT
Start: 2020-12-02

## 2020-12-02 RX ORDER — SODIUM CHLORIDE 0.9 % (FLUSH) 0.9 %
10 SYRINGE (ML) INJECTION PRN
Status: CANCELLED | OUTPATIENT
Start: 2020-12-02

## 2020-12-02 RX ORDER — SODIUM CHLORIDE 9 MG/ML
INJECTION, SOLUTION INTRAVENOUS CONTINUOUS
Status: CANCELLED | OUTPATIENT
Start: 2020-12-02

## 2020-12-02 RX ORDER — PACLITAXEL 100 MG/20ML
125 INJECTION, POWDER, LYOPHILIZED, FOR SUSPENSION INTRAVENOUS ONCE
Status: COMPLETED | OUTPATIENT
Start: 2020-12-02 | End: 2020-12-02

## 2020-12-02 RX ORDER — DEXAMETHASONE SODIUM PHOSPHATE 4 MG/ML
8 INJECTION, SOLUTION INTRA-ARTICULAR; INTRALESIONAL; INTRAMUSCULAR; INTRAVENOUS; SOFT TISSUE ONCE
Status: COMPLETED | OUTPATIENT
Start: 2020-12-02 | End: 2020-12-02

## 2020-12-02 RX ORDER — DIPHENHYDRAMINE HYDROCHLORIDE 50 MG/ML
50 INJECTION INTRAMUSCULAR; INTRAVENOUS ONCE
Status: CANCELLED | OUTPATIENT
Start: 2020-12-02

## 2020-12-02 RX ORDER — MEPERIDINE HYDROCHLORIDE 50 MG/ML
12.5 INJECTION INTRAMUSCULAR; INTRAVENOUS; SUBCUTANEOUS ONCE
Status: CANCELLED | OUTPATIENT
Start: 2020-12-02

## 2020-12-02 RX ORDER — HYDROCORTISONE ACETATE 25 MG/1
25 SUPPOSITORY RECTAL EVERY 12 HOURS
Qty: 20 SUPPOSITORY | Refills: 1 | Status: ON HOLD | OUTPATIENT
Start: 2020-12-02 | End: 2020-12-14 | Stop reason: HOSPADM

## 2020-12-02 RX ORDER — HEPARIN SODIUM (PORCINE) LOCK FLUSH IV SOLN 100 UNIT/ML 100 UNIT/ML
500 SOLUTION INTRAVENOUS PRN
Status: CANCELLED | OUTPATIENT
Start: 2020-12-02

## 2020-12-02 RX ORDER — EPINEPHRINE 1 MG/ML
0.3 INJECTION, SOLUTION, CONCENTRATE INTRAVENOUS PRN
Status: CANCELLED | OUTPATIENT
Start: 2020-12-02

## 2020-12-02 RX ORDER — SODIUM CHLORIDE 9 MG/ML
20 INJECTION, SOLUTION INTRAVENOUS ONCE
Status: COMPLETED | OUTPATIENT
Start: 2020-12-02 | End: 2020-12-02

## 2020-12-02 RX ADMIN — SODIUM CHLORIDE 20 ML/HR: 9 INJECTION, SOLUTION INTRAVENOUS at 10:20

## 2020-12-02 RX ADMIN — GEMCITABINE 1860 MG: 38 INJECTION, SOLUTION INTRAVENOUS at 10:53

## 2020-12-02 RX ADMIN — DEXAMETHASONE SODIUM PHOSPHATE 8 MG: 4 INJECTION INTRA-ARTICULAR; INTRALESIONAL; INTRAMUSCULAR; INTRAVENOUS; SOFT TISSUE at 10:25

## 2020-12-02 RX ADMIN — PACLITAXEL 235 MG: 100 INJECTION, POWDER, LYOPHILIZED, FOR SUSPENSION INTRAVENOUS at 11:38

## 2020-12-02 ASSESSMENT — PAIN DESCRIPTION - PROGRESSION: CLINICAL_PROGRESSION: NOT CHANGED

## 2020-12-02 ASSESSMENT — PAIN DESCRIPTION - ORIENTATION: ORIENTATION: RIGHT;MID

## 2020-12-02 ASSESSMENT — PAIN DESCRIPTION - DESCRIPTORS: DESCRIPTORS: ACHING

## 2020-12-02 ASSESSMENT — PAIN DESCRIPTION - PAIN TYPE: TYPE: CHRONIC PAIN

## 2020-12-02 ASSESSMENT — PAIN DESCRIPTION - LOCATION: LOCATION: BACK

## 2020-12-02 ASSESSMENT — PAIN SCALES - GENERAL
PAINLEVEL_OUTOF10: 5
PAINLEVEL_OUTOF10: 5

## 2020-12-02 ASSESSMENT — PAIN DESCRIPTION - FREQUENCY: FREQUENCY: CONTINUOUS

## 2020-12-02 NOTE — ONCOLOGY
Chemotherapy Administration    Pre-assessment Data: Antineoplastic Agents  Other:   See toxicity flow sheet for assessment [x]     Physician Notification of Concerns Related to Chemotherapy Administration:   Physician Notified Michael Mensah / Time of Notification      Interventions:   Lab work assessed  [x]   Height / Weight verified for dose [x]   Current MAR reviewed [x]   Emergency drugs available as appropriate [x]   Anaphylaxis assessment completed [x]   Pre-medications administered as ordered [x]   Blood return noted upon initiation of chemotherapy [x]   Blood return noted each 1-2ml of a vesicant medication if given IV push []   Blood return noted each 2-3ml of a non-vesicant medication if given IV push []   Monitor for signs / symptoms of hypersensitivity reaction [x]   Chemotherapy orders (drug/dose/rate) verified by 2 Chemo certified RNs [x]   Monitor IV site and blood return throughout the infusion of the medication [x]   Document IV site checks on the IV assessment form [x]   Document chemotherapy teaching on the Patient Education tab [x]   Document patient verbalizes understanding of medications being administered [x]   If IV infiltration, see ONS Guidelines []   Other:      []

## 2020-12-02 NOTE — PROGRESS NOTES
[facility]    Reason for Clinic visit:      Metastatic pancreatic cancer with metastasis to liver    HPI:      This is a 76year old male with a previous history of prostate cancer now presenting with severe abdominal pain with metastatic pancreatic cancer.      He initially started to experience bilateral abdominal pain a few months ago, thinking it was kidney stones. The pain then further spread to the pelvis and the testicles. He presented to his PCP on 10/15/2020 with the lower abdominal and pelvic pain. Dr. Chapito Carballo ordered CT abdomen pelvis on 10/21/2020 showing a necrotic mass involving the neck, proximal body of the pancreas. This mass seems to involve the stomach as well making this lesion concerning for malignancy. Liver biopsy on 10/27/2020 showed poorly differentiated carcinoma, solidifying the diagnosis of metastatic pancreatic cancer.      History of Prostate Cancer August 2015  In August 2015 he had an elevated PSA of 15.07. He underwent transrectal ultrasound and biopsy showing [place] 7 adenocarcinoma present within the prostate. He was treated with neoadjuvant Lupron in December 2015 and completed radiotherapy to the prostate on 04/21/2016. His symptoms of urine obstruction got better over the course of the treatment.      11/10/2020 he presents to the oncology clinic for evaluation and treatment of his pancreatic cancer. He reports excruciating pain that is very limiting to his everyday life. He is only able to walk for short periods of time and to ambulate to the bathroom. Other than that he is mostly sedentary. He reports a shooting pain that is relatively constant everywhere from \"his shoulders to his anus. \" He is currently taking morphine 15 mg every 8 hours and using ibuprofen 600 mg for break through pain. His pain is not well managed on this regimen. He rates this pain 10/10 severity. ECOG score 3 as of 11/10/2020.  He is capable of only limited selfcare; confined to bed or chair more than 50% of waking hours. He admits to decrease in appetite and poor food intake. Additionally the patient is nauseous most everyday. On November 13, 2020 he started palliative chemotherapy with Gemzar and Abraxane. Interim  history on 12/02/2020:  Patient presents to the medical oncology clinic for post hospitalization follow-up visit and continuation of palliative chemotherapy with cycle #2 of Abraxane and Gemzar. He was admitted to the hospital on November 19, 2020 with uncontrolled pain, hyponatremia, constipation and severe malnutrition. The patient was seen by pain management team.  Hyponatremia improved with fluid restrictions and salt tablets. Date the patient reports that his appetite is still poor, his leg swelling is getting worse. His pain is reasonably well controlled. He denies having any fevers no signs or symptoms of infections since being discharged from the hospital.  Patient reports worsening painful rectal hemorrhoids.     Past Medical History         Diagnosis Date    Anxiety     Arthritis     Cancer ([address]     Chronic skin ulcer ([address]     Hyperlipidemia     Pancreatic cancer ([address]     Prostate cancer ([address]     Sleep apnea       Past Surgical History           Procedure Laterality Date    APPENDECTOMY      BACK SURGERY      KNEE CARTILAGE SURGERY Right 1977    KNEE SURGERY Left     1980    NASAL SEPTUM SURGERY Left      Allergies    Anaprox [naproxen] and Vicodin [hydrocodone-acetaminophen]  Social History     Social History     Socioeconomic History    Marital status:      Spouse name: Not on file    Number of children: Not on file    Years of education: Not on file    Highest education level: Not on file   Occupational History    Not on file   Social Needs    Financial resource strain: Not on file    Food insecurity     Worry: Not on file     Inability: Not on file    Transportation needs     Medical: Not on file     Non-medical: Not on file   Tobacco Use    Smoking status: Current Every Day Smoker     Packs/day: 1.00     Years: 50.00     Pack years: 50.00     Types: Cigarettes     Start date: 5    Smokeless tobacco: Never Used   Substance and Sexual Activity    Alcohol use: Not Currently    Drug use: Not Currently    Sexual activity: Not Currently     Partners: Female   Lifestyle    Physical activity     Days per week: Not on file     Minutes per session: Not on file    Stress: Not on file   Relationships    Social connections     Talks on phone: Not on file     Gets together: Not on file     Attends Sikh service: Not on file     Active member of club or organization: Not on file     Attends meetings of clubs or organizations: Not on file     Relationship status: Not on file    Intimate partner violence     Fear of current or ex partner: Not on file     Emotionally abused: Not on file     Physically abused: Not on file     Forced sexual activity: Not on file   Other Topics Concern    Not on file   Social History Narrative    Not on file     Family History          Problem Relation Age of Onset    Cancer Mother     Depression Mother     Heart Disease Mother      ROS     Review of Systems   Constitutional: Positive for activity change, appetite change, fatigue and unexpected weight change. Negative for fever. HENT: Negative for congestion, dental problem, facial swelling, hearing loss, mouth sores, nosebleeds, sore throat, tinnitus and trouble swallowing. Eyes: Negative for discharge and visual disturbance. Respiratory: Negative for cough, chest tightness, shortness of breath and wheezing. Cardiovascular: Positive for leg swelling. Negative for chest pain and palpitations. Gastrointestinal: Positive for abdominal pain and constipation. Negative for abdominal distention, blood in stool, diarrhea, nausea, rectal pain and vomiting. Endocrine: Negative for cold intolerance, polydipsia and polyuria.    Genitourinary: Negative for decreased urine volume, difficulty urinating, dysuria, flank pain, hematuria and urgency. Musculoskeletal: Positive for back pain. Negative for arthralgias, gait problem, joint swelling, myalgias and neck stiffness. Skin: Negative for color change, rash and wound. Neurological: Negative for dizziness, tremors, seizures, speech difficulty, weakness, light-headedness, numbness and headaches. Hematological: Negative for adenopathy. Does not bruise/bleed easily. Psychiatric/Behavioral: Negative for confusion and sleep disturbance. The patient is nervous/anxious. Pertinent review of systems noted in HPI, all other ROS negative. Vitals     height is 5' 10\" (1.778 m) and weight is 173 lb 12.8 oz (78.8 kg). His infrared temperature is 98.7 °F (37.1 °C). His blood pressure is 110/58 (abnormal) and his pulse is 111. His respiration is 18 and oxygen saturation is 99%. Exam   Physical Exam   General appearance: No apparent distress, calm and cooperative. HEENT: Pupils equal, round, and reactive to light. Conjunctivae/corneas clear. Oral mucosa dry. Neck: Supple, with full range of motion. Trachea midline. Respiratory:  Normal respiratory effort. Clear to auscultation with exp wheeze noted throughout. Cardiovascular: Regular rate and rhythm with normal S1/S2. Abdomen: Soft, tender, slightly distended with active bowel sounds. Musculoskeletal: No clubbing, cyanosis bilaterally. Limited range of motion without deformity d/t physical deconditioning. BLE edema noted, +2. Skin: Skin color, texture, turgor normal.    Neurologic:  Neurovascularly intact without any focal sensory/motor deficits.  Cranial nerves: II-XII intact, grossly non-focal.  Psychiatric: Alert and oriented, thought content appropriate, normal insight  Capillary Refill: Brisk,< 3 seconds   Peripheral Pulses: +2 palpable, equal bilaterally        Labs     Lab Results   Component Value Date    WBC 14.1 (H) 12/02/2020 HGB 11.1 (L) 12/02/2020    HCT 34.3 (L) 12/02/2020    MCV 98 (H) 12/02/2020     (H) 12/02/2020       Chemistry        Component Value Date/Time     12/03/2020 1123    K 4.0 12/03/2020 1123    K 4.7 11/20/2020 0416    CL 99 12/03/2020 1123    CO2 26 12/03/2020 1123    BUN 20 12/03/2020 1123    CREATININE 0.5 12/03/2020 1123        Component Value Date/Time    CALCIUM 8.2 (L) 12/03/2020 1123    ALKPHOS 500 (H) 12/02/2020 0858    AST 67 (H) 12/02/2020 0858    ALT 52 12/02/2020 0858    BILITOT 4.5 (H) 12/02/2020 0858            Radiology        Xr Abdomen (kub) (single Ap View)    Result Date: 11/19/2020  PROCEDURE: XR ABDOMEN (KUB) (SINGLE AP VIEW) CLINICAL INFORMATION: Severe abdominal pain, hx of pancreatic CA . COMPARISON: No prior study. TECHNIQUE: 2 supine projections of the abdomen FINDINGS: Bowel gas pattern is nonspecific. Gas is present to the distal colon. Gas is not definitively identified and rectum. Stomach is mildly gas distended. Loops of gas filled bowel are seen in the mid abdomen one loop of borderline dilated small bowel. Properitoneal fat stripes are preserved. Left psoas fat stripe is obscured. No pathologic calcifications are seen. There is dextroscoliosis and prior L3-L5 laminectomy. Nonspecific gas pattern. **This report has been created using voice recognition software. It may contain minor errors which are inherent in voice recognition technology. ** Final report electronically signed by Dr. Karine Victor on 11/19/2020 4:51 PM      Assessment/Recommendations    1. Pancreatic cancer with metastasis to liver. Proceed with cycle number 2-day 1 today. Continue monitoring his liver function, if bilirubin continues to rise we will eliminate Abraxane. 2. Cancer related pain.   Patient is on MS Contin 30 mg scheduled every 8 hours for maintenance pain and oral liquid morphine 10 mg / 5 mg solution with instruction to take 15 mg to 20 mg every 4 hours as needed for moderate to severe breakthrough pain. 3.  Worsening leg swelling. He had stat Doppler study that showed  acute partial deep vein thrombosis in the proximal right femoral vein. Patient is placed on Lovenox 1.5 mg/kg daily which equals to daily dose of 120 mg. Teaching provided for subcu injection  4. Leukocytosis - WBC 14.1 today. Afebrile. Likely malignancy driven. 5.  Normocytic anemia - H/H 11.1 today. Likely related to recent chemotherapy. 6.  Abnormal liver function. Phosphatase at 500, total bilirubin four 4.5.  7.  Painful prolapsed rectal hemorrhoids.   Referral to GI consult      Electronically signed by   Dagoberto Bustamante MD on 12/8/2020 at 12:56 PM

## 2020-12-02 NOTE — PROGRESS NOTES
Patient assessed for the following post chemotherapy:    Dizziness   No  Lightheadedness  No      Acute nausea/vomiting No  Headache   No  Chest pain/pressure  No  Rash/itching   No  Shortness of breath  No    Patient kept for 20 minutes observation post infusion chemotherapy. Patient tolerated chemotherapy treatment Abraxane/gemzar without any complications. Last vital signs:   BP (!) 141/71   Pulse 90   Temp 97.5 °F (36.4 °C) (Oral)   Resp 18   Ht 5' 10\" (1.778 m)   Wt 173 lb 12.8 oz (78.8 kg)   SpO2 97%   BMI 24.94 kg/m²     . Patient instructed if experience any of the above symptoms following today's infusion,he/she is to notify MD immediately or go to the emergency department. Discharge instructions given to patient. Verbalizes understanding. Transported off unit in wheelchair with belongings.  took him to hospital for doppler on legs.

## 2020-12-02 NOTE — PROGRESS NOTES
Patient brought back to the clinic for education, accompanied by Dr. Esther Polk, for lovenox injections. Patient provided lovenox teaching and education kit. Patient able to report back instructions without any issues. Patient voiced no other needs. Instructed medication was available and ready for  and to start his treatment tonight. Patient ambulated off the unit with no further questions per self.

## 2020-12-02 NOTE — PATIENT INSTRUCTIONS
1. Treat with Abraxane Gemzar today  2. Bilateral venous Doppler today after completion of Abraxane and Gemzar  3. Referral to GI for painful prolapsed hemorrhoids  4. RTC to see me in 2 weeks.   On RTC labs: CBC, BMP, LFTs, CA 19-9 and next dose of Abraxane Gemzar

## 2020-12-02 NOTE — PLAN OF CARE
Problem: Musculor/Skeletal Functional Status  Goal: Absence of falls  Outcome: Met This Shift  Note: No falls occurred with visit today. Intervention: Fall precautions  Note: Verbalized understanding of fall prevention to ask for assistance with ambulation. Call light within reach. Transported off unit in wheelchair. Problem: Intellectual/Education/Knowledge Deficit  Goal: Teaching initiated upon admission  Outcome: Met This Shift  Note: Patient verbalizes understanding to verbal information given on abraxane/Gemzar,action and possible side effects. Aware to call MD if develop complications. Goal: Written Disposition Instruction form completed  Outcome: Met This Shift  Intervention: Verbal/written education provided  Note: Chemotherapy Teaching     What is Chemotherapy   Drug action [x]   Method of Administration [x]   Handouts given []     Side Effects  Nausea/vomiting [x]   Diarrhea [x]   Fatigue [x]   Signs / Symptoms of infection [x]   Neutropenia [x]   Thrombocytopenia [x]   Alopecia [x]   neuropathy [x]   [place] diet &  the importance of fluids [x]       Micellaneous  Importance of nutrition [x]   Importance of oral hygiene [x]   When to call the MD [x]   Monitoring labs [x]   Use of supportive services []     Explanation of Drug Regimen / Frequency  C2D1 Gemzar/Abraxane     Comments  Verbalized understanding to drug,action,side effects and when to call MD         Problem: Discharge Planning  Goal: Knowledge of discharge instructions  Description: Knowledge of discharge instructions  Outcome: Met This Shift  Note: Verbalized understanding of discharge instructions, follow-up appointments, and when to call the physician. Intervention: Interaction with patient/family and care team  Note: Discuss understanding of discharge instructions,follow-up appointments, and when to call the physician. Care plan reviewed with patient .   Patient  verbalize understanding of the plan of care and contribute to goal setting.

## 2020-12-03 ENCOUNTER — OFFICE VISIT (OUTPATIENT)
Dept: PHYSICAL MEDICINE AND REHAB | Age: 65
End: 2020-12-03
Payer: COMMERCIAL

## 2020-12-03 ENCOUNTER — TELEPHONE (OUTPATIENT)
Dept: NEPHROLOGY | Age: 65
End: 2020-12-03

## 2020-12-03 ENCOUNTER — HOSPITAL ENCOUNTER (OUTPATIENT)
Age: 65
Discharge: HOME | End: 2020-12-03
Payer: COMMERCIAL

## 2020-12-03 VITALS
HEIGHT: 70 IN | BODY MASS INDEX: 24.77 KG/M2 | DIASTOLIC BLOOD PRESSURE: 60 MMHG | WEIGHT: 173 LBS | TEMPERATURE: 97.6 F | SYSTOLIC BLOOD PRESSURE: 116 MMHG

## 2020-12-03 DIAGNOSIS — C25.1 MALIGNANT NEOPLASM OF BODY OF PANCREAS (HCC): ICD-10-CM

## 2020-12-03 LAB
ANION GAP SERPL CALCULATED.3IONS-SCNC: 10 MEQ/L (ref 8–16)
BUN BLDV-MCNC: 20 MG/DL (ref 7–22)
CALCIUM SERPL-MCNC: 8.2 MG/DL (ref 8.5–10.5)
CHLORIDE BLD-SCNC: 99 MEQ/L (ref 98–111)
CO2: 26 MEQ/L (ref 23–33)
CREAT SERPL-MCNC: 0.5 MG/DL (ref 0.4–1.2)
GFR SERPL CREATININE-BSD FRML MDRD: > 90 ML/MIN/1.73M2
GLUCOSE BLD-MCNC: 110 MG/DL (ref 70–108)
POTASSIUM SERPL-SCNC: 4 MEQ/L (ref 3.5–5.2)
SODIUM BLD-SCNC: 135 MEQ/L (ref 135–145)

## 2020-12-03 PROCEDURE — 80048 BASIC METABOLIC PNL TOTAL CA: CPT

## 2020-12-03 PROCEDURE — 36415 COLL VENOUS BLD VENIPUNCTURE: CPT

## 2020-12-03 PROCEDURE — 99214 OFFICE O/P EST MOD 30 MIN: CPT | Performed by: ANESTHESIOLOGY

## 2020-12-03 RX ORDER — MORPHINE SULFATE 15 MG/1
15 TABLET ORAL EVERY 4 HOURS PRN
Qty: 120 TABLET | Refills: 0 | Status: ON HOLD | OUTPATIENT
Start: 2020-12-03 | End: 2020-12-14 | Stop reason: HOSPADM

## 2020-12-03 RX ORDER — LIDOCAINE 50 MG/G
2 PATCH TOPICAL DAILY
Qty: 60 PATCH | Refills: 0 | Status: SHIPPED | OUTPATIENT
Start: 2020-12-03 | End: 2021-01-02

## 2020-12-03 NOTE — TELEPHONE ENCOUNTER
Pt phoned states he is taking flomax 0.4mg qd - when he was just in hosp they increased the flomax and he felt it was helping a lot better- but he wasn't discharged with the new dose - he states he is urinating at night but not during the day - wondering if he can increase the flomax and how much?

## 2020-12-03 NOTE — PROGRESS NOTES
Chronic Pain Clinic Note     Encounter Date: 12/3/20    Subjective:   Chief Complaint:   Chief Complaint   Patient presents with    Follow-up     hospital follow up        History of Present Illness:   Concetta Hawthorne is a 76 y.o. male seen in the Pain Clinic initially on 11/19/20 upon request from Ashish Nieto MD (Oncology) for his history of adbominal pain. His medical history of prostate cancer (August 2015 s/p chemoradiation) and recent diagnosis of pancreatic cancer. He initially experienced abdominal pain several months ago and upon evaluation with a CT abdomen pelvis on 10/21/2020 revealed a necrotic mass involving the neck, proximal body of the pancreas. There is also some mass involving the stomach concerning for malignancy. A biopsy done on 10/27/20 revealed metastatic pancreatic cancer. He was evaluated by the oncology team on 11/10/2020 in which he underwent his first treatment of chemotherapy. In addition, there were plans to admit the patient directly for pain management however due to the ED being overcrowded he was unable to be admitted. In addition, they increased his long-acting morphine to 30/30/15 mg in 8-hour intervals with 15 mg morphine immediate release for breakthrough pain. He states he has been taking about 4 of the 50 mg immediate release tablets per day. He states that despite this increase his pain is still excruciating 10/10. He is unable to even sit upright due to the pain being so severe. The worst of his pain is on the right side of the abdomen but will radiate through both of the lower parts of his abdomen and up the entire abdomen. He also endorses severe pain radiating around to his back. He states that he has very poor appetite with associated nausea/vomiting. In addition, he notes that he has been able to pass bowel movements for a few days and has been only having very loose stools.     Today, 12/3/2020, patient presents for plan follow-up after discharge from the hospital for treatment of his pain secondary to metastatic pancreatic cancer. He states that he is doing much better overall since his last visit with me. He states that the hospital got his severe pain under much better control. He still endorses a lot of back pain particularly between the shoulder blades which radiates up and down more so on the right and left side of his back. He continues to endorse issues with lack of appetite and the occasional nausea. He has been continue to take his morphine 30 mg extended release every 8 hours and is morphine 15 mg immediate release about 3-4 times per day. He feels like this is managing his severe pain at this point. He also is taking his gabapentin 40 mg 3 times a day and this is for his fibromyalgia he states. For his back pain he has been using lidocaine patches which she feels like does sometimes help and gives him relief for 5 hours but other times can be only about 45 minutes. He feels like his pain is interfering with him sleeping at nighttime because he is unable to get a comfortable position. Of note, he is being treated for a DVT in the right leg and is on Lovenox currently.     Current Treatment Medications:   Morphine ER 30 mg/30 mg/15 mg  Morphine IR 15 mg - 4 tablets/day  Gabapentin 400 mg 3 times daily  Lidocaine patch 4%    Imaging:  CT Abd/Pelvis (10/21/20)          Past Medical History:   Diagnosis Date    Anxiety     Arthritis     Cancer ([address]     Chronic skin ulcer ([address]     Hyperlipidemia     Pancreatic cancer ([address]     [facility]     Sleep apnea        Past Surgical History:   Procedure Laterality Date    APPENDECTOMY      BACK SURGERY      KNEE CARTILAGE SURGERY Right 1977    KNEE SURGERY Left     1980    NASAL SEPTUM SURGERY Left        Family History   Problem Relation Age of Onset    Cancer Mother     Depression Mother     Heart Disease Mother        Social History     Socioeconomic History    Marital status:      Spouse name: Not on file    Number of children: Not on file    Years of education: Not on file    Highest education level: Not on file   Occupational History    Not on file   Social Needs    Financial resource strain: Not on file    Food insecurity     Worry: Not on file     Inability: Not on file    Transportation needs     Medical: Not on file     Non-medical: Not on file   Tobacco Use    Smoking status: Current Every Day Smoker     Packs/day: 1.00     Years: 50.00     Pack years: 50.00     Types: Cigarettes     Start date: 1970    Smokeless tobacco: Never Used   Substance and Sexual Activity    Alcohol use: Not Currently    Drug use: Not Currently    Sexual activity: Not Currently     Partners: Female   Lifestyle    Physical activity     Days per week: Not on file     Minutes per session: Not on file    Stress: Not on file   Relationships    Social connections     Talks on phone: Not on file     Gets together: Not on file     Attends Scientologist service: Not on file     Active member of club or organization: Not on file     Attends meetings of clubs or organizations: Not on file     Relationship status: Not on file    Intimate partner violence     Fear of current or ex partner: Not on file     Emotionally abused: Not on file     Physically abused: Not on file     Forced sexual activity: Not on file   Other Topics Concern    Not on file   Social History Narrative    Not on file       Medications & Allergies:   Current Outpatient Medications   Medication Instructions    docusate sodium (COLACE) 100 mg, Oral, 2 TIMES DAILY    enoxaparin (LOVENOX) 1 mg/kg, Subcutaneous, DAILY    gabapentin (NEURONTIN) 400 mg, Oral, 3 TIMES DAILY    hydrocortisone (ALA-CARA) 1 % cream Apply topically 2 times daily.  hydrocortisone (ANUSOL-HC) 25 mg, Rectal, EVERY 12 HOURS    lidocaine (LIDODERM) 5 % 2 patches, Transdermal, DAILY, 12 hours on, 12 hours off.     morphine (MSIR) 15 mg, Oral, EVERY 4 HOURS PRN    Morphine Sulfate ER 30 mg, Oral, EVERY 8 HOURS SCHEDULED    naloxegol (MOVANTIK) 12.5 mg, Oral, EVERY MORNING    omeprazole (PRILOSEC) 20 mg, Oral, DAILY    polyethylene glycol (GLYCOLAX) 17 g, Oral, DAILY    sodium chloride 2 g, Oral, 3 TIMES DAILY WITH MEALS    tamsulosin (FLOMAX) 0.4 mg, Oral, DAILY       Allergies   Allergen Reactions    Anaprox [Naproxen] Itching    Vicodin [Hydrocodone-Acetaminophen] Nausea Only       Review of Systems:   Constitutional: positive for poor appetite and weight loss  Genitourinary: negative for bowel/bladder incontinence  GI: negative for loose stools  Musculoskeletal: positive for abdominal and back pain  Neurological: negative for any leg weakness or numbness/tingling  Behavioral/Psych: positive for anxiety  All other systems reviewed and are negative    Objective:     Vitals:    12/03/20 0937   BP: 116/60   Temp: 97.6 °F (36.4 °C)       Constitutional: Pale and jaundiced appearing, distressed  Head: Normocephalic, atraumatic   Eyes: Conjunctivae normal   Neck: Supple, symmetrical   Respiration: Non-labored breathing   Cardiovascular: Limbs warm and well perfused   Abdomen: Diffuse tenderness to palpation in all quadrants. MSK: TTP over paraspinal muscles of thoracic spine, pain worse with extension and rotation of back. Neuro: Alert, oriented. CN II-XII appear grossly intact. No focal deficits appreciated. Skin: no skin rashes or lesions visible on abdomen   Psychological: Cooperative, interactive    Assessment:    Diagnosis Orders   1. Generalized abdominal pain     2. Pancreatic cancer metastasized to liver (HCC)  Morphine Sulfate ER 30 MG T12A    morphine (MSIR) 15 MG tablet   3. Chemotherapy management, encounter for  morphine (MSIR) 15 MG tablet   4. Other chronic pain     5.  Chronic bilateral thoracic back pain         Briana Barrios is a 76 y.o.male presenting to the pain clinic for evaluation of severe abdominal pain secondary to metastatic pancreatic cancer. Due to his severe excruciating pain even with long and short acting morphine, concern for stool burden, nausea/vomiting, and severely diminished appetite I made the decision to admit the patient directly to the hospital for closer management and observation. Today, he is doing much better overall after being discharged from the hospital.  We will continue his morphine extended release 30 mg every 8 hours with morphine immediate release 15 mg he can take up to 4 times a day as needed for cancer related pain. In addition, I have provided him with some lidocaine patches 5% that he can apply to his back. Due to the recent DVT and being on Lovenox and patient's reservations about pursuing an injection, we will hold off on a celiac plexus block at this point and focus on medication management. We will follow-up in 4 weeks for reevaluation. Plan: The following treatment recommendations and plan were discussed in detail with Briana Barrios. Imaging:   I have reviewed patients imaging of CT Abd/Pelvis and results were discussed with patient today. Analgesics: The patient is currently managing their pain with the use of morphine extended release 30 mg/30 mg/30 mg every 8 hours with morphine 15 mg IR every 4-6 hours. We will continue these medications for his cancer related pain. Adjuvants:   Patient is taking gabapentin 400 mg 3 times a day for fibromyalgia. I would encourage patient continue this medication. For his associated back pain we will start Lidocaine 5% patches that he can use 2 patches every 12 hours for his back pain. Interventions:   No intervention pursued at this visit. Anticoagulation/NPO Recommendations:   No intervention pursued at this visit.     Multidisciplinary Pain Management:   In the presence of complex, chronic, and multi-factorial pain, the importance of a multidisciplinary approach to pain management in the patients management regimen was emphasized and discussed in great detail.      Referrals:  None    Prescriptions Written This Visit:   Morphine 30 mg ER (#90, 0 refills)  Morphine 15 mg IR (#120, 0 refills)  Lidocaine 5% patches    Follow-up: 4 weeks      Rodrigo Reynaga DO  Interventional Pain Management/PM&R   [place]

## 2020-12-04 RX ORDER — BUMETANIDE 1 MG/1
1 TABLET ORAL DAILY
Qty: 60 TABLET | Refills: 0 | Status: SHIPPED | OUTPATIENT
Start: 2020-12-04

## 2020-12-04 NOTE — TELEPHONE ENCOUNTER
He called back today what's to know what to do? He said he urinates the most when he is asleep. When he was in the hospital they did something to help him pee, but he is not sure what he did for sure. Flomax 1 time a day is what he takes right now. He can only pee a little bit.

## 2020-12-07 ENCOUNTER — TELEPHONE (OUTPATIENT)
Dept: ONCOLOGY | Age: 65
End: 2020-12-07

## 2020-12-07 RX ORDER — MORPHINE SULFATE 30 MG/1
30 TABLET, FILM COATED, EXTENDED RELEASE ORAL 3 TIMES DAILY
Qty: 90 TABLET | Refills: 0 | Status: ON HOLD | OUTPATIENT
Start: 2020-12-07 | End: 2020-12-14 | Stop reason: HOSPADM

## 2020-12-07 NOTE — TELEPHONE ENCOUNTER
Informed that Magic Mouthwash was sent in to pharmacy. Also if patient feels he is not getting enough fluids he is to let us know so we can schedule him for fluids. Also, if patient not better in 2 days he  is to let us know for further evaluation. Voiced understanding.

## 2020-12-08 ENCOUNTER — HOSPITAL ENCOUNTER (OUTPATIENT)
Age: 65
Discharge: HOME | End: 2020-12-08
Payer: COMMERCIAL

## 2020-12-08 DIAGNOSIS — E87.1 HYPONATREMIA: ICD-10-CM

## 2020-12-08 PROCEDURE — 36415 COLL VENOUS BLD VENIPUNCTURE: CPT

## 2020-12-08 PROCEDURE — 80048 BASIC METABOLIC PNL TOTAL CA: CPT

## 2020-12-08 ASSESSMENT — ENCOUNTER SYMPTOMS
NAUSEA: 0
BACK PAIN: 1
EYE DISCHARGE: 0
FACIAL SWELLING: 0
SORE THROAT: 0
BLOOD IN STOOL: 0
DIARRHEA: 0
CONSTIPATION: 1
ABDOMINAL PAIN: 1
COLOR CHANGE: 0
ABDOMINAL DISTENTION: 0
VOMITING: 0
SHORTNESS OF BREATH: 0
CHEST TIGHTNESS: 0
COUGH: 0
TROUBLE SWALLOWING: 0
RECTAL PAIN: 0
WHEEZING: 0

## 2020-12-09 ENCOUNTER — HOSPITAL ENCOUNTER (OUTPATIENT)
Dept: INFUSION THERAPY | Age: 65
Discharge: HOME | DRG: 435 | End: 2020-12-09
Payer: COMMERCIAL

## 2020-12-09 ENCOUNTER — HOSPITAL ENCOUNTER (INPATIENT)
Age: 65
LOS: 5 days | Discharge: HOSPICE | DRG: 435 | End: 2020-12-14
Attending: EMERGENCY MEDICINE | Admitting: FAMILY MEDICINE
Payer: COMMERCIAL

## 2020-12-09 ENCOUNTER — APPOINTMENT (OUTPATIENT)
Dept: CT IMAGING | Age: 65
DRG: 435 | End: 2020-12-09
Payer: COMMERCIAL

## 2020-12-09 ENCOUNTER — OFFICE VISIT (OUTPATIENT)
Dept: ONCOLOGY | Age: 65
End: 2020-12-09
Payer: COMMERCIAL

## 2020-12-09 ENCOUNTER — APPOINTMENT (OUTPATIENT)
Dept: MRI IMAGING | Age: 65
DRG: 435 | End: 2020-12-09
Payer: COMMERCIAL

## 2020-12-09 VITALS
TEMPERATURE: 98.6 F | DIASTOLIC BLOOD PRESSURE: 53 MMHG | RESPIRATION RATE: 18 BRPM | WEIGHT: 180.5 LBS | HEIGHT: 70 IN | SYSTOLIC BLOOD PRESSURE: 114 MMHG | BODY MASS INDEX: 25.84 KG/M2 | HEART RATE: 109 BPM | OXYGEN SATURATION: 95 %

## 2020-12-09 VITALS
RESPIRATION RATE: 18 BRPM | TEMPERATURE: 98.6 F | BODY MASS INDEX: 25.84 KG/M2 | WEIGHT: 180.5 LBS | DIASTOLIC BLOOD PRESSURE: 53 MMHG | SYSTOLIC BLOOD PRESSURE: 114 MMHG | OXYGEN SATURATION: 95 % | HEIGHT: 70 IN | HEART RATE: 109 BPM

## 2020-12-09 DIAGNOSIS — C25.9 PANCREATIC CANCER METASTASIZED TO LIVER (HCC): ICD-10-CM

## 2020-12-09 DIAGNOSIS — C78.7 PANCREATIC CANCER METASTASIZED TO LIVER (HCC): ICD-10-CM

## 2020-12-09 PROBLEM — R17 JAUNDICE: Status: ACTIVE | Noted: 2020-12-09

## 2020-12-09 LAB
ABSOLUTE IMMATURE GRANULOCYTE: 0.09 THOU/MM3 (ref 0–0.07)
ALBUMIN SERPL-MCNC: 2.2 G/DL (ref 3.5–5.1)
ALBUMIN SERPL-MCNC: 2.4 G/DL (ref 3.5–5.1)
ALP BLD-CCNC: 497 U/L (ref 38–126)
ALP BLD-CCNC: 512 U/L (ref 38–126)
ALT SERPL-CCNC: 163 U/L (ref 11–66)
ALT SERPL-CCNC: 166 U/L (ref 11–66)
AMMONIA: 17 UMOL/L (ref 11–60)
ANION GAP SERPL CALCULATED.3IONS-SCNC: 10 MEQ/L (ref 8–16)
ANION GAP SERPL CALCULATED.3IONS-SCNC: 11 MEQ/L (ref 8–16)
AST SERPL-CCNC: 121 U/L (ref 5–40)
AST SERPL-CCNC: 124 U/L (ref 5–40)
BACTERIA: ABNORMAL
BASINOPHIL, AUTOMATED: 0 % (ref 0–3)
BASOPHILS # BLD: 0.6 %
BASOPHILS ABSOLUTE: 0 THOU/MM3 (ref 0–0.1)
BASOPHILS ABSOLUTE: 0 THOU/MM3 (ref 0–0.1)
BILIRUB SERPL-MCNC: 8.1 MG/DL (ref 0.3–1.2)
BILIRUB SERPL-MCNC: 8.2 MG/DL (ref 0.3–1.2)
BILIRUBIN DIRECT: 6.9 MG/DL (ref 0–0.3)
BILIRUBIN URINE: ABNORMAL
BLOOD, URINE: NEGATIVE
BUN BLDV-MCNC: 28 MG/DL (ref 7–22)
BUN BLDV-MCNC: 31 MG/DL (ref 7–22)
BUN, WHOLE BLOOD: 25 MG/DL (ref 8–26)
CALCIUM SERPL-MCNC: 8 MG/DL (ref 8.5–10.5)
CALCIUM SERPL-MCNC: 8.1 MG/DL (ref 8.5–10.5)
CASTS: ABNORMAL /LPF
CASTS: ABNORMAL /LPF
CHARACTER, URINE: ABNORMAL
CHLORIDE BLD-SCNC: 94 MEQ/L (ref 98–111)
CHLORIDE BLD-SCNC: 98 MEQ/L (ref 98–111)
CHLORIDE, WHOLE BLOOD: 95 MEQ/L (ref 98–109)
CO2: 27 MEQ/L (ref 23–33)
CO2: 28 MEQ/L (ref 23–33)
COLOR: ABNORMAL
CREAT SERPL-MCNC: 0.6 MG/DL (ref 0.4–1.2)
CREAT SERPL-MCNC: 0.7 MG/DL (ref 0.4–1.2)
CREATININE, WHOLE BLOOD: 1 MG/DL (ref 0.5–1.2)
CRYSTALS: ABNORMAL
EOSINOPHIL # BLD: 2.9 %
EOSINOPHILS ABSOLUTE: 0.1 THOU/MM3 (ref 0–0.4)
EOSINOPHILS ABSOLUTE: 0.2 THOU/MM3 (ref 0–0.4)
EOSINOPHILS RELATIVE PERCENT: 4 % (ref 0–4)
EPITHELIAL CELLS, UA: ABNORMAL /HPF
ERYTHROCYTE [DISTWIDTH] IN BLOOD BY AUTOMATED COUNT: 16.9 % (ref 11.5–14.5)
ERYTHROCYTE [DISTWIDTH] IN BLOOD BY AUTOMATED COUNT: 56.4 FL (ref 35–45)
GFR SERPL CREATININE-BSD FRML MDRD: > 90 ML/MIN/1.73M2
GFR SERPL CREATININE-BSD FRML MDRD: > 90 ML/MIN/1.73M2
GFR, ESTIMATED: 79 ML/MIN/1.73M2
GLUCOSE BLD-MCNC: 109 MG/DL (ref 70–108)
GLUCOSE BLD-MCNC: 122 MG/DL (ref 70–108)
GLUCOSE, URINE: NEGATIVE MG/DL
GLUCOSE, WHOLE BLOOD: 125 MG/DL (ref 70–108)
HCT VFR BLD CALC: 25 % (ref 42–52)
HCT VFR BLD CALC: 26.2 % (ref 42–52)
HCT VFR BLD CALC: 27.1 % (ref 42–52)
HEMOGLOBIN: 8.5 GM/DL (ref 14–18)
HEMOGLOBIN: 9 GM/DL (ref 14–18)
HEMOGLOBIN: 9.1 GM/DL (ref 14–18)
ICTOTEST: POSITIVE
IMMATURE GRANS (ABS): 0.26 THOU/MM3 (ref 0–0.07)
IMMATURE GRANULOCYTES: 2 %
IMMATURE GRANULOCYTES: 5.1 %
INR BLD: 1.98 (ref 0.85–1.13)
IONIZED CALCIUM, WHOLE BLOOD: 1.06 MMOL/L (ref 1.12–1.32)
KETONES, URINE: ABNORMAL
LEUKOCYTE EST, POC: ABNORMAL
LIPASE: 10.1 U/L (ref 5.6–51.3)
LYMPHOCYTES # BLD: 6.8 %
LYMPHOCYTES # BLD: 7 % (ref 15–47)
LYMPHOCYTES ABSOLUTE: 0.3 THOU/MM3 (ref 1–4.8)
LYMPHOCYTES ABSOLUTE: 0.3 THOU/MM3 (ref 1–4.8)
MCH RBC QN AUTO: 31.5 PG (ref 26–33)
MCH RBC QN AUTO: 32 PG (ref 26–33)
MCHC RBC AUTO-ENTMCNC: 33.6 GM/DL (ref 32.2–35.5)
MCHC RBC AUTO-ENTMCNC: 34.4 GM/DL (ref 32.2–35.5)
MCV RBC AUTO: 93.2 FL (ref 80–94)
MCV RBC AUTO: 94 FL (ref 80–94)
MISCELLANEOUS LAB TEST RESULT: ABNORMAL
MONOCYTES # BLD: 5.5 %
MONOCYTES ABSOLUTE: 0.2 THOU/MM3 (ref 0.4–1.3)
MONOCYTES ABSOLUTE: 0.3 THOU/MM3 (ref 0.4–1.3)
MONOCYTES: 5 % (ref 0–12)
NITRITE, URINE: ABNORMAL
NUCLEATED RED BLOOD CELLS: 0 /100 WBC
OSMOLALITY CALCULATION: 270.6 MOSMOL/KG (ref 275–300)
PDW BLD-RTO: 16.6 % (ref 11.5–14.5)
PH UA: 6 (ref 5–9)
PLATELET # BLD: 90 THOU/MM3 (ref 130–400)
PLATELET # BLD: 98 THOU/MM3 (ref 130–400)
PLATELET ESTIMATE: ABNORMAL
PMV BLD AUTO: 11.1 FL (ref 9.4–12.4)
PMV BLD AUTO: 11.7 FL (ref 9.4–12.4)
POTASSIUM REFLEX MAGNESIUM: 3.9 MEQ/L (ref 3.5–5.2)
POTASSIUM SERPL-SCNC: 4.3 MEQ/L (ref 3.5–5.2)
POTASSIUM, WHOLE BLOOD: 4.1 MEQ/L (ref 3.5–4.9)
PRO-BNP: 338.6 PG/ML (ref 0–900)
PROTEIN UA: ABNORMAL MG/DL
RBC # BLD: 2.81 MILL/MM3 (ref 4.7–6.1)
RBC # BLD: 2.89 MILL/MM3 (ref 4.7–6.1)
RBC URINE: ABNORMAL /HPF
RENAL EPITHELIAL, UA: ABNORMAL
SCAN OF BLOOD SMEAR: NORMAL
SEG NEUTROPHILS: 79.1 %
SEG NEUTROPHILS: 83 % (ref 43–75)
SEGMENTED NEUTROPHILS ABSOLUTE COUNT: 4 THOU/MM3 (ref 1.8–7.7)
SEGMENTED NEUTROPHILS ABSOLUTE COUNT: 4 THOU/MM3 (ref 1.8–7.7)
SODIUM BLD-SCNC: 132 MEQ/L (ref 135–145)
SODIUM BLD-SCNC: 136 MEQ/L (ref 135–145)
SODIUM, WHOLE BLOOD: 133 MEQ/L (ref 138–146)
SPECIFIC GRAVITY UA: > 1.03 (ref 1–1.03)
TOTAL CO2, WHOLE BLOOD: 29 MEQ/L (ref 23–33)
TOTAL PROTEIN: 4.8 G/DL (ref 6.1–8)
TOTAL PROTEIN: 4.9 G/DL (ref 6.1–8)
TROPONIN T: < 0.01 NG/ML
UROBILINOGEN, URINE: 1 EU/DL (ref 0–1)
WBC # BLD: 4.8 THOU/MM3 (ref 4.8–10.8)
WBC # BLD: 5.1 THOU/MM3 (ref 4.8–10.8)
WBC UA: ABNORMAL /HPF
YEAST: ABNORMAL

## 2020-12-09 PROCEDURE — 6360000002 HC RX W HCPCS: Performed by: EMERGENCY MEDICINE

## 2020-12-09 PROCEDURE — 74177 CT ABD & PELVIS W/CONTRAST: CPT

## 2020-12-09 PROCEDURE — 87077 CULTURE AEROBIC IDENTIFY: CPT

## 2020-12-09 PROCEDURE — 1111F DSCHRG MED/CURRENT MED MERGE: CPT | Performed by: PHYSICIAN ASSISTANT

## 2020-12-09 PROCEDURE — 3017F COLORECTAL CA SCREEN DOC REV: CPT | Performed by: PHYSICIAN ASSISTANT

## 2020-12-09 PROCEDURE — 82140 ASSAY OF AMMONIA: CPT

## 2020-12-09 PROCEDURE — 85018 HEMOGLOBIN: CPT

## 2020-12-09 PROCEDURE — G8484 FLU IMMUNIZE NO ADMIN: HCPCS | Performed by: PHYSICIAN ASSISTANT

## 2020-12-09 PROCEDURE — 99211 OFF/OP EST MAY X REQ PHY/QHP: CPT

## 2020-12-09 PROCEDURE — 1123F ACP DISCUSS/DSCN MKR DOCD: CPT | Performed by: PHYSICIAN ASSISTANT

## 2020-12-09 PROCEDURE — G8428 CUR MEDS NOT DOCUMENT: HCPCS | Performed by: PHYSICIAN ASSISTANT

## 2020-12-09 PROCEDURE — 85014 HEMATOCRIT: CPT

## 2020-12-09 PROCEDURE — 6360000004 HC RX CONTRAST MEDICATION: Performed by: EMERGENCY MEDICINE

## 2020-12-09 PROCEDURE — 99215 OFFICE O/P EST HI 40 MIN: CPT | Performed by: PHYSICIAN ASSISTANT

## 2020-12-09 PROCEDURE — G8417 CALC BMI ABV UP PARAM F/U: HCPCS | Performed by: PHYSICIAN ASSISTANT

## 2020-12-09 PROCEDURE — 6370000000 HC RX 637 (ALT 250 FOR IP): Performed by: STUDENT IN AN ORGANIZED HEALTH CARE EDUCATION/TRAINING PROGRAM

## 2020-12-09 PROCEDURE — 4004F PT TOBACCO SCREEN RCVD TLK: CPT | Performed by: PHYSICIAN ASSISTANT

## 2020-12-09 PROCEDURE — 85025 COMPLETE CBC W/AUTO DIFF WBC: CPT

## 2020-12-09 PROCEDURE — 80047 BASIC METABLC PNL IONIZED CA: CPT

## 2020-12-09 PROCEDURE — 99223 1ST HOSP IP/OBS HIGH 75: CPT | Performed by: FAMILY MEDICINE

## 2020-12-09 PROCEDURE — 85610 PROTHROMBIN TIME: CPT

## 2020-12-09 PROCEDURE — 80053 COMPREHEN METABOLIC PANEL: CPT

## 2020-12-09 PROCEDURE — 2580000003 HC RX 258: Performed by: STUDENT IN AN ORGANIZED HEALTH CARE EDUCATION/TRAINING PROGRAM

## 2020-12-09 PROCEDURE — 2580000003 HC RX 258: Performed by: FAMILY MEDICINE

## 2020-12-09 PROCEDURE — 96374 THER/PROPH/DIAG INJ IV PUSH: CPT

## 2020-12-09 PROCEDURE — 36415 COLL VENOUS BLD VENIPUNCTURE: CPT

## 2020-12-09 PROCEDURE — 6360000002 HC RX W HCPCS: Performed by: FAMILY MEDICINE

## 2020-12-09 PROCEDURE — 81001 URINALYSIS AUTO W/SCOPE: CPT

## 2020-12-09 PROCEDURE — 74181 MRI ABDOMEN W/O CONTRAST: CPT

## 2020-12-09 PROCEDURE — 94760 N-INVAS EAR/PLS OXIMETRY 1: CPT

## 2020-12-09 PROCEDURE — 6360000002 HC RX W HCPCS: Performed by: STUDENT IN AN ORGANIZED HEALTH CARE EDUCATION/TRAINING PROGRAM

## 2020-12-09 PROCEDURE — 96376 TX/PRO/DX INJ SAME DRUG ADON: CPT

## 2020-12-09 PROCEDURE — 84484 ASSAY OF TROPONIN QUANT: CPT

## 2020-12-09 PROCEDURE — 4040F PNEUMOC VAC/ADMIN/RCVD: CPT | Performed by: PHYSICIAN ASSISTANT

## 2020-12-09 PROCEDURE — 87186 SC STD MICRODIL/AGAR DIL: CPT

## 2020-12-09 PROCEDURE — 83690 ASSAY OF LIPASE: CPT

## 2020-12-09 PROCEDURE — 1200000003 HC TELEMETRY R&B

## 2020-12-09 PROCEDURE — 99284 EMERGENCY DEPT VISIT MOD MDM: CPT

## 2020-12-09 PROCEDURE — 87086 URINE CULTURE/COLONY COUNT: CPT

## 2020-12-09 PROCEDURE — 83880 ASSAY OF NATRIURETIC PEPTIDE: CPT

## 2020-12-09 PROCEDURE — 86301 IMMUNOASSAY TUMOR CA 19-9: CPT

## 2020-12-09 RX ORDER — DOCUSATE SODIUM 100 MG/1
100 CAPSULE, LIQUID FILLED ORAL 2 TIMES DAILY
Status: DISCONTINUED | OUTPATIENT
Start: 2020-12-09 | End: 2020-12-14 | Stop reason: HOSPADM

## 2020-12-09 RX ORDER — MORPHINE SULFATE 15 MG/1
15 TABLET ORAL EVERY 4 HOURS PRN
Status: DISCONTINUED | OUTPATIENT
Start: 2020-12-09 | End: 2020-12-14 | Stop reason: HOSPADM

## 2020-12-09 RX ORDER — ACETAMINOPHEN 650 MG/1
650 SUPPOSITORY RECTAL EVERY 6 HOURS PRN
Status: DISCONTINUED | OUTPATIENT
Start: 2020-12-09 | End: 2020-12-14 | Stop reason: HOSPADM

## 2020-12-09 RX ORDER — POLYETHYLENE GLYCOL 3350 17 G/17G
17 POWDER, FOR SOLUTION ORAL DAILY PRN
Status: DISCONTINUED | OUTPATIENT
Start: 2020-12-09 | End: 2020-12-14 | Stop reason: HOSPADM

## 2020-12-09 RX ORDER — POTASSIUM CHLORIDE 20 MEQ/1
40 TABLET, EXTENDED RELEASE ORAL PRN
Status: DISCONTINUED | OUTPATIENT
Start: 2020-12-09 | End: 2020-12-14 | Stop reason: HOSPADM

## 2020-12-09 RX ORDER — SODIUM CHLORIDE 9 MG/ML
INJECTION, SOLUTION INTRAVENOUS CONTINUOUS
Status: DISCONTINUED | OUTPATIENT
Start: 2020-12-09 | End: 2020-12-10

## 2020-12-09 RX ORDER — SODIUM CHLORIDE 0.9 % (FLUSH) 0.9 %
10 SYRINGE (ML) INJECTION PRN
Status: DISCONTINUED | OUTPATIENT
Start: 2020-12-09 | End: 2020-12-14 | Stop reason: HOSPADM

## 2020-12-09 RX ORDER — LIDOCAINE 4 G/G
1 PATCH TOPICAL DAILY
Status: DISCONTINUED | OUTPATIENT
Start: 2020-12-10 | End: 2020-12-14 | Stop reason: HOSPADM

## 2020-12-09 RX ORDER — TAMSULOSIN HYDROCHLORIDE 0.4 MG/1
0.4 CAPSULE ORAL DAILY
Status: DISCONTINUED | OUTPATIENT
Start: 2020-12-09 | End: 2020-12-14 | Stop reason: HOSPADM

## 2020-12-09 RX ORDER — POTASSIUM CHLORIDE 7.45 MG/ML
10 INJECTION INTRAVENOUS PRN
Status: DISCONTINUED | OUTPATIENT
Start: 2020-12-09 | End: 2020-12-14 | Stop reason: HOSPADM

## 2020-12-09 RX ORDER — ONDANSETRON 2 MG/ML
4 INJECTION INTRAMUSCULAR; INTRAVENOUS EVERY 6 HOURS PRN
Status: DISCONTINUED | OUTPATIENT
Start: 2020-12-09 | End: 2020-12-14 | Stop reason: HOSPADM

## 2020-12-09 RX ORDER — PROMETHAZINE HYDROCHLORIDE 25 MG/1
12.5 TABLET ORAL EVERY 6 HOURS PRN
Status: DISCONTINUED | OUTPATIENT
Start: 2020-12-09 | End: 2020-12-14 | Stop reason: HOSPADM

## 2020-12-09 RX ORDER — GABAPENTIN 400 MG/1
400 CAPSULE ORAL 3 TIMES DAILY
Status: DISCONTINUED | OUTPATIENT
Start: 2020-12-09 | End: 2020-12-14 | Stop reason: HOSPADM

## 2020-12-09 RX ORDER — HYDROCORTISONE ACETATE 25 MG/1
25 SUPPOSITORY RECTAL EVERY 12 HOURS
Status: DISCONTINUED | OUTPATIENT
Start: 2020-12-09 | End: 2020-12-14 | Stop reason: HOSPADM

## 2020-12-09 RX ORDER — SODIUM CHLORIDE 0.9 % (FLUSH) 0.9 %
10 SYRINGE (ML) INJECTION EVERY 12 HOURS SCHEDULED
Status: DISCONTINUED | OUTPATIENT
Start: 2020-12-09 | End: 2020-12-14 | Stop reason: HOSPADM

## 2020-12-09 RX ORDER — SODIUM CHLORIDE 1000 MG
2 TABLET, SOLUBLE MISCELLANEOUS
Status: DISCONTINUED | OUTPATIENT
Start: 2020-12-09 | End: 2020-12-12

## 2020-12-09 RX ORDER — ACETAMINOPHEN 325 MG/1
650 TABLET ORAL EVERY 6 HOURS PRN
Status: DISCONTINUED | OUTPATIENT
Start: 2020-12-09 | End: 2020-12-14 | Stop reason: HOSPADM

## 2020-12-09 RX ORDER — MORPHINE SULFATE 30 MG/1
30 TABLET, FILM COATED, EXTENDED RELEASE ORAL 3 TIMES DAILY
Status: DISCONTINUED | OUTPATIENT
Start: 2020-12-09 | End: 2020-12-14 | Stop reason: HOSPADM

## 2020-12-09 RX ORDER — PANTOPRAZOLE SODIUM 40 MG/1
40 TABLET, DELAYED RELEASE ORAL
Status: DISCONTINUED | OUTPATIENT
Start: 2020-12-10 | End: 2020-12-14 | Stop reason: HOSPADM

## 2020-12-09 RX ORDER — BUMETANIDE 1 MG/1
1 TABLET ORAL 2 TIMES DAILY
Status: DISCONTINUED | OUTPATIENT
Start: 2020-12-09 | End: 2020-12-10

## 2020-12-09 RX ORDER — POLYETHYLENE GLYCOL 3350 17 G/17G
17 POWDER, FOR SOLUTION ORAL DAILY
Status: DISCONTINUED | OUTPATIENT
Start: 2020-12-10 | End: 2020-12-14 | Stop reason: HOSPADM

## 2020-12-09 RX ORDER — POLYETHYLENE GLYCOL 3350 17 G/17G
17 POWDER, FOR SOLUTION ORAL DAILY
Status: DISCONTINUED | OUTPATIENT
Start: 2020-12-10 | End: 2020-12-09 | Stop reason: CLARIF

## 2020-12-09 RX ADMIN — HYDROMORPHONE HYDROCHLORIDE 1 MG: 1 INJECTION, SOLUTION INTRAMUSCULAR; INTRAVENOUS; SUBCUTANEOUS at 13:22

## 2020-12-09 RX ADMIN — MORPHINE SULFATE 30 MG: 30 TABLET, FILM COATED, EXTENDED RELEASE ORAL at 21:14

## 2020-12-09 RX ADMIN — Medication 10 ML: at 18:16

## 2020-12-09 RX ADMIN — Medication 5 ML: at 22:45

## 2020-12-09 RX ADMIN — IOPAMIDOL 80 ML: 755 INJECTION, SOLUTION INTRAVENOUS at 14:37

## 2020-12-09 RX ADMIN — GABAPENTIN 400 MG: 400 CAPSULE ORAL at 21:18

## 2020-12-09 RX ADMIN — MORPHINE SULFATE 30 MG: 30 TABLET, FILM COATED, EXTENDED RELEASE ORAL at 18:28

## 2020-12-09 RX ADMIN — TAMSULOSIN HYDROCHLORIDE 0.4 MG: 0.4 CAPSULE ORAL at 21:18

## 2020-12-09 RX ADMIN — MORPHINE SULFATE 15 MG: 15 TABLET ORAL at 22:28

## 2020-12-09 RX ADMIN — HYDROMORPHONE HYDROCHLORIDE 0.5 MG: 1 INJECTION, SOLUTION INTRAMUSCULAR; INTRAVENOUS; SUBCUTANEOUS at 14:35

## 2020-12-09 RX ADMIN — PIPERACILLIN AND TAZOBACTAM 3.38 G: 3; .375 INJECTION, POWDER, LYOPHILIZED, FOR SOLUTION INTRAVENOUS at 21:24

## 2020-12-09 RX ADMIN — GABAPENTIN 400 MG: 400 CAPSULE ORAL at 18:16

## 2020-12-09 RX ADMIN — BUMETANIDE 1 MG: 1 TABLET ORAL at 21:18

## 2020-12-09 RX ADMIN — SODIUM CHLORIDE TAB 1 GM 2 G: 1 TAB at 18:16

## 2020-12-09 RX ADMIN — ONDANSETRON 4 MG: 2 INJECTION INTRAMUSCULAR; INTRAVENOUS at 18:57

## 2020-12-09 RX ADMIN — SODIUM CHLORIDE: 9 INJECTION, SOLUTION INTRAVENOUS at 18:17

## 2020-12-09 RX ADMIN — DOCUSATE SODIUM 100 MG: 100 CAPSULE, LIQUID FILLED ORAL at 21:18

## 2020-12-09 ASSESSMENT — PAIN SCALES - GENERAL
PAINLEVEL_OUTOF10: 5
PAINLEVEL_OUTOF10: 8
PAINLEVEL_OUTOF10: 7
PAINLEVEL_OUTOF10: 6
PAINLEVEL_OUTOF10: 8
PAINLEVEL_OUTOF10: 5
PAINLEVEL_OUTOF10: 8
PAINLEVEL_OUTOF10: 9

## 2020-12-09 ASSESSMENT — PAIN DESCRIPTION - LOCATION
LOCATION: MOUTH
LOCATION: BACK

## 2020-12-09 ASSESSMENT — PAIN DESCRIPTION - ORIENTATION
ORIENTATION: LOWER

## 2020-12-09 ASSESSMENT — ENCOUNTER SYMPTOMS
CONSTIPATION: 1
SHORTNESS OF BREATH: 0
COUGH: 0
EYE PAIN: 0
BLOOD IN STOOL: 0
DIARRHEA: 0
TROUBLE SWALLOWING: 0
BACK PAIN: 1
ABDOMINAL PAIN: 1
NAUSEA: 0
ABDOMINAL DISTENTION: 1
VOMITING: 0

## 2020-12-09 ASSESSMENT — PAIN DESCRIPTION - PAIN TYPE
TYPE: ACUTE PAIN

## 2020-12-09 ASSESSMENT — PAIN DESCRIPTION - ONSET
ONSET: ON-GOING

## 2020-12-09 ASSESSMENT — PAIN DESCRIPTION - DESCRIPTORS
DESCRIPTORS: BURNING

## 2020-12-09 ASSESSMENT — PAIN DESCRIPTION - FREQUENCY
FREQUENCY: CONTINUOUS

## 2020-12-09 ASSESSMENT — PAIN - FUNCTIONAL ASSESSMENT
PAIN_FUNCTIONAL_ASSESSMENT: PREVENTS OR INTERFERES SOME ACTIVE ACTIVITIES AND ADLS
PAIN_FUNCTIONAL_ASSESSMENT: PREVENTS OR INTERFERES SOME ACTIVE ACTIVITIES AND ADLS

## 2020-12-09 ASSESSMENT — PAIN DESCRIPTION - PROGRESSION
CLINICAL_PROGRESSION: NOT CHANGED
CLINICAL_PROGRESSION: GRADUALLY WORSENING
CLINICAL_PROGRESSION: GRADUALLY WORSENING

## 2020-12-09 ASSESSMENT — PAIN DESCRIPTION - DIRECTION: RADIATING_TOWARDS: BACK

## 2020-12-09 NOTE — ED TRIAGE NOTES
Patient was at his chemo therapy appt and was told that his labs particulary of the liver were concerning and he would need to be evaluated by a provider first before returning to receiving future treatments. He has also noticed worsening of bilateral leg swelling within the last few days. He reports having pancreatic cancer that he is receiving chemo for. Denies any shortness of breath.

## 2020-12-09 NOTE — PROGRESS NOTES
[facility], [address] [address],[address]  [address] [address]  Dept: [phone]  Dept Fax: [phone]: [phone]      Visit Date:12/9/2020     Zeke Jerome is a 76 y.o. male who presents today for:   Chief Complaint   Patient presents with    Follow-up     pancreatic Ca        HPI:   Zeke Jerome is a 76 y.o. male followed by Dr. Svetlana Salazar for metastatic pancreatic cancer. Per Dr. Morales Crew note on 12/2/20: This is a 76year old male with a previous history of prostate cancer now presenting with severe abdominal pain with metastatic pancreatic cancer.      He initially started to experience bilateral abdominal pain a few months ago, thinking it was kidney stones. The pain then further spread to the pelvis and the testicles. He presented to his PCP on 10/15/2020 with the lower abdominal and pelvic pain. Dr. Jones Mediate ordered CT abdomen pelvis on 10/21/2020 showing a necrotic mass involving the neck, proximal body of the pancreas. This mass seems to involve the stomach as well making this lesion concerning for malignancy. Liver biopsy on 10/27/2020 showed poorly differentiated carcinoma, solidifying the diagnosis of metastatic pancreatic cancer.      History of Prostate Cancer August 2015  In August 2015 he had an elevated PSA of 15.07. He underwent transrectal ultrasound and biopsy showing Smilax 7 adenocarcinoma present within the prostate. He was treated with neoadjuvant Lupron in December 2015 and completed radiotherapy to the prostate on 04/21/2016. His symptoms of urine obstruction got better over the course of the treatment.      11/10/2020 he presents to the oncology clinic for evaluation and treatment of his pancreatic cancer. He reports excruciating pain that is very limiting to his everyday life. He is only able to walk for short periods of time and to ambulate to the bathroom. Other than that he is mostly sedentary.  He reports a shooting pain that is relatively constant everywhere from \"his shoulders to his anus. \" He is currently taking morphine 15 mg every 8 hours and using ibuprofen 600 mg for break through pain. His pain is not well managed on this regimen. He rates this pain 10/10 severity. ECOG score 3 as of 11/10/2020. He is capable of only limited selfcare; confined to bed or chair more than 50% of waking hours. He admits to decrease in appetite and poor food intake. Additionally the patient is nauseous most everyday. On November 13, 2020 he started palliative chemotherapy with Gemzar and Abraxane.       Interval History 12/9/2020: The patient was scheduled to receive cycle #2, day 8 of Gemzar and Abraxane today. He was added to my schedule due to concerns from nursing staff. The patient reports following chemotherapy one week ago he developed oral mucositis. He called the clinic on 12/7/20 and was prescribed magic mouthwash and instructed to come in prior to today if worsening. The patient reports magic mouthwash helped mildly but continues to have mucositis involving tongue, gum and roof of mouth. He affirms poor oral intake. The patient affirms chronic pain which is stable. The patient affirms constipation and difficulty urinating. He states he had a bowel movement this am which was his first bowel movement in 12 days. Describes stool as being light yellow in color. The patient is jaundiced on physical exam with tremor, slow/sluggish responses. He denies fever, chills. He denies chest pain, shortness of breath. The patient's friend, Ren Ginotamra, was with the patient who stated the patient has been \"off\" the last several days. One week ago on 12/2/20 the patient was started on therapeutic Lovenox for acute DVT of right lower extremity. The patient reports worsening bilateral LE edema with scrotal swelling. Affirms scrotal pain.        Past Medical History:   Diagnosis Date    Anxiety     Arthritis     Cancer ([address]     Chronic skin ulcer ([address]     Hyperlipidemia     [facility]     Prostate cancer ([facility]     Sleep apnea       Past Surgical History:   Procedure Laterality Date    APPENDECTOMY      BACK SURGERY      KNEE CARTILAGE SURGERY Right 1977    KNEE SURGERY Left     1980    NASAL SEPTUM SURGERY Left       Family History   Problem Relation Age of Onset    Cancer Mother     Depression Mother     Heart Disease Mother       Social History     Tobacco Use    Smoking status: Current Every Day Smoker     Packs/day: 1.00     Years: 50.00     Pack years: 50.00     Types: Cigarettes     Start date: 1970    Smokeless tobacco: Never Used   Substance Use Topics    Alcohol use: Not Currently      Current Outpatient Medications   Medication Sig Dispense Refill    morphine (MS CONTIN) 30 MG extended release tablet Take 1 tablet by mouth 3 times daily for 30 days. 90 tablet 0    Magic Mouthwash (MIRACLE MOUTHWASH) Swish and spit 5 mLs 4 times daily as needed for Irritation 1:1:1:1 nystatin, viscous lidocaine, benadryl, maalox 200 mL 1    bumetanide (BUMEX) 1 MG tablet Take 1 tablet by mouth daily 60 tablet 0    morphine (MSIR) 15 MG tablet Take 1 tablet by mouth every 4 hours as needed for Pain for up to 30 days. 120 tablet 0    lidocaine (LIDODERM) 5 % Place 2 patches onto the skin daily 12 hours on, 12 hours off. 60 patch 0    hydrocortisone (ALA-CARA) 1 % cream Apply topically 2 times daily.  1 Tube 1    hydrocortisone (ANUSOL-HC) 25 MG suppository Place 1 suppository rectally every 12 hours for 10 days 20 suppository 1    enoxaparin (LOVENOX) 120 MG/0.8ML injection Inject 0.53 mLs into the skin daily 30 Syringe 1    sodium chloride 1 g tablet Take 2 tablets by mouth 3 times daily (with meals) 90 tablet 0    naloxegol (MOVANTIK) 12.5 MG TABS tablet Take 1 tablet by mouth every morning 10 tablet 0    omeprazole (PRILOSEC) 20 MG delayed release capsule Take 1 capsule by mouth daily 30 capsule 3    gabapentin (NEURONTIN) 400 MG capsule Take 400 mg by mouth 3 times daily.  polyethylene glycol (GLYCOLAX) 17 GM/SCOOP powder Take 17 g by mouth daily      docusate sodium (COLACE) 100 MG capsule Take 100 mg by mouth 2 times daily      tamsulosin (FLOMAX) 0.4 MG capsule Take 1 capsule by mouth daily 90 capsule 1     No current facility-administered medications for this visit. Allergies   Allergen Reactions    Anaprox [Naproxen] Itching    Vicodin [Hydrocodone-Acetaminophen] Nausea Only          Review of Systems:   Review of Systems   Pertinent review of systems noted in HPI, all other ROS negative. Objective:   Physical Exam   BP (!) 114/53 (Position: Sitting, Cuff Size: Medium Adult)   Pulse 109   Temp 98.6 °F (37 °C)   Resp 18   Ht 5' 10\" (1.778 m)   Wt 180 lb 8 oz (81.9 kg)   SpO2 95%   BMI 25.90 kg/m²    General appearance: No apparent distress, ill appearing on chronically ill appearance. Jaundiced. HEENT: Pupils equal, round, and reactive to light. Scleral icterus. Oral mucosa dry with mucositis involving the gum line to low and upper jaw, present to roof of mouth. Poor dentition noted. Neck: Supple, with full range of motion. Trachea midline. Respiratory: Bilateral expiratory wheezes with rales. On room air with O2 sat 95%. Cardiovascular: tachycardic, regular rhythm. Abdomen: Soft, slightly-distended with active bowel sounds. Musculoskeletal: examined in chair due to debility. 4+ bilateral LE edema, pitting. Skin: Skin color jaundiced, texture, turgor normal.    Neurologic: alert and oriented but slow to respond at times. Asterix[place].        Imaging Studies and Labs:   CBC:   Lab Results   Component Value Date    WBC 4.8 12/09/2020    HGB 9.1 (L) 12/09/2020    HCT 27.1 (L) 12/09/2020    MCV 94 12/09/2020    PLT 98 (L) 12/09/2020     BMP:   Lab Results   Component Value Date     12/09/2020     12/08/2020    K 4.1 12/09/2020    K 4.3 12/08/2020    K 4.7 11/20/2020    CL 98 12/08/2020    CO2 27 12/08/2020    BUN 31 12/08/2020    CREATININE 1.0 12/09/2020    CREATININE 0.7 12/08/2020    GLUCOSE 122 12/08/2020    CALCIUM 8.1 12/08/2020      LFT:   Lab Results   Component Value Date    ALT 52 12/02/2020    AST 67 (H) 12/02/2020    ALKPHOS 500 (H) 12/02/2020    BILITOT 4.5 (H) 12/02/2020         Assessment and Plan:   1. Metastatic Pancreatic Cancer  Known liver metastasis. The patient was started on palliative chemotherapy with Gemzar and Abraxane on 11/13/2020.   2. Palliative Chemotherapy  He is due for cycle #2, day 8 of Gemzar and Abraxane today. Hold chemotherapy due to elevated LFT, thrombocytopenia (plt count dropped from 498,000 on 12/2/20 to 98,000 today on 12/9/20), oral mucositis. 3. Jaundice, hyperbilirubinemia, elevated LFT   Bilirubin 8.1 compared to 4.5 on 12/2/20. Alk phos 512, , . Unclear etiology if related intrahepatic ductal dilation versus obstruction from malignancy. Discussed worsening LFT with the patient and his friend today. Discussed with current  LFT, unable to give chemotherapy. Discussed recommendation for ED evaluation and hospital admission for further evaluation and treatment. Discussed if LFT does not improve he will not be a candidate for further chemotherapy.    -Called report to Jenice Duane, RN in the ED. Recommend MRCP to evaluate cause of hyperbilirubinemia and if stent able to be placed if obstruction present. 4. Oral Mucositis   Recommend to continue magic mouthwash. 5. Recently Diagnosed Acute DVT of the Right Lower Extremity  Noted on doppler on 12/2/20 with acute DVT in proximal right femoral vein. Patient has been on therapeutic Lovenox. Denies any bleeding. I spent 40 minutes face-to-face with the patient. Greater than 50% of time was spent on counseling and coordinating care. All patient questions answered. Pt voiced understanding. Reviewed assessment and plan with Dr. Qasim Sosa. Follow up as directed. Patient instructed to call for questions or concerns. Electronically signed by   John Corona PA-C

## 2020-12-09 NOTE — ED NOTES
ED to inpatient nurses report    Chief Complaint   Patient presents with    Leg Swelling     bilateral, chronic but worsening    Jaundice    Other     sent over by PCP for liver work-up      Present to ED from home  LOC: alert and orientated to name, place, date  Vital signs   Vitals:    12/09/20 1206 12/09/20 1319 12/09/20 1529   BP: 116/63 127/63 (!) 113/58   Pulse: 103 106 109   Resp: 16 17 18   Temp: 98 °F (36.7 °C)  97.8 °F (36.6 °C)   TempSrc: Oral  Oral   SpO2: 97% 98% 97%   Weight: 184 lb (83.5 kg)     Height: 5' 10\" (1.778 m)        Oxygen Baseline room air    Current needs required 0L Bipap/Cpap No  LDAs:   Peripheral IV 12/09/20 Left Antecubital (Active)   Site Assessment Intact;Dry;Clean 12/09/20 1531   Line Status Blood return noted; Flushed 12/09/20 1319   Dressing Status Clean;Dry; Intact 12/09/20 1531   Dressing Intervention New 12/09/20 1319     Mobility: Independent  Pending ED orders: complete  Present condition: stable    Electronically signed by Joyce Collins RN on 12/9/2020 at 3:39 PM       Joyce Collins RN  12/09/20 1939

## 2020-12-09 NOTE — ED NOTES
Pt resting on cot, family at bedside. Pt steates pain is 8/10, informed patient we will give pin medication time to work. Respirations even and unlabored.      Arin Philip RN  12/09/20 0797

## 2020-12-09 NOTE — H&P
HISTORY & PHYSICAL       Patient:  Duane Patches  YOB: 1955    MRN: [id number]     Acct: [de-identified]    PCP: Ashleigh Grider MD    Date of Admission: 12/9/2020    Date of Service: Pt seen/examined on 12/9/2020 and admitted to inpatient with expected LOS greater than two midnights due to medical therapy. Assessment & Plan:     Jaundice, likely due to obstruction from pancreatic malignancy   -Noted when he presented to oncology clinic 12/9/2020   -Total bilirubin 8.2  -CT abdomen pelvis \"4.6 cm irregular hypodense mass at the junction of the body and tail of the pancreas, numerous hepatic metastases and possible splenic metastasis, mesenteric and omental mets with moderate ascites\"  -MRCP   -consult to GI     Left Flank Pain, due to pancreatic cancer vs. Pyelonephritis   -afebrile but having chills, no urinary symptoms. check UA, (+) CVA tenderness  -history of uncontrolled pain, was seen by pain management     Anasarca, due to hypoalbuminemia up to level of umbilicus   -due to hypoalbuminemia   -Increased daily bumex to 1 mg BID  -consult to nephrology to assist with diuresis     Transaminitis, likely due to liver metastases vs possible obstruction  -, , Alk phos 163  -continue to trend, repeat LFT's tomorrow   -MRCP   -GI consult     Normocytic Anemia, likely from chemotherapy  -Hgb 9.0 on admission, baseline 11-12.5  -H&H q6 hours x2 occurrences and CBC daily    Thrombocytopenia, unclear etiology   -90 on admission, baseline difficult to establish.  Was 498 12/2  -Repeat CBC tomorrow   -Started lovenox recently, if platelets continue to decrease consider HIT workup     Hyponatremia, mild  -gentle IVF     Pseudohypocalcemia secondary to hypoalbuminemia  -Ca 8.0, Albumin 2.2     DVT proximal right femoral vein   -Noted on US 12/2  -Continue therapeutic lovenox    Metastatic Pancreatic Cancer   -diagnosed with pancreatic cancer on 10/27/2020 s/p liver biopsy   -Last dose of palliative chemotherapy (paclitaxel and gemcitabine) last week  -Consult to oncology, palliative care, and consider pain management consult if pain remains uncontrolled     Oral mucositis  -continue magic mouthwash as needed     Prostate cancer, [address] Adenocarcinoma  -noted      Malnutrition, due to malignancy and poor oral intake   -ONS    Code status: Full code, consult to palliative care for goals of care discussion          Chief Complaint:  Jaundice, Flank Pain       History Of Present Illness:    76year old male with past medical history significant for prostate cancer, recent pancreatic cancer, oral mucositis, hyperlipidemia, arthritis and anxiety who presented from oncology office for evaluation of jaundice. He also has recent diagnosis of acute DVT and on therapeutic lovenox. Per office oncology note, \"Bilirubin 8.1 compared to 4.5 on 12/2/20. Alk phos 512, , . Unclear etiology if related intrahepatic ductal dilation versus obstruction from malignancy. Discussed worsening LFT with the patient and his friend today. Discussed with current  LFT, unable to give chemotherapy. Discussed recommendation for ED evaluation and hospital admission for further evaluation and treatment. Discussed if LFT does not improve he will not be a candidate for further chemotherapy. Called report to Chris Maldonado RN in the ED. Recommend MRCP to evaluate cause of hyperbilirubinemia and if stent able to be placed if obstruction present. \" He reports that he has had increasing fatigue, swelling of his bilateral legs and scrotum, and left flank plain that feels like, \"hit with a baseball bat,\" and is constant. Denies dysuria symptoms. Denies fevers. Reports chills. On presentation, his vital signs were , /63, RR 16, 97% on room air. Labs significant for Na 132, BUN 28, Ca 8.0, ionized Ca 1.06, Total protein 4.8. Albumin 2.2, alkaline phosphatase 497, , , total bilirubin 8.2.  Hgb 9.0, platelets 90. CT abdomen and pelvis demonstrated, 4.6 cm irregular hypodense mass at the junction of the body and tail of the pancreas, numerous hepatic mets and possible splenic mets, mesenteric and omental mets with moderate ascites, and anasarca. He was admitted to the hospitalists. Please see A&P for further details. Past Medical History:          Diagnosis Date    Anxiety     Arthritis     Cancer ([address]     [facility] [address]     Hyperlipidemia     [facility] [address]     Prostate cancer ([facility] [address]     Sleep apnea        Past Surgical History:          Procedure Laterality Date    APPENDECTOMY      BACK SURGERY      KNEE CARTILAGE SURGERY Right 1977    KNEE SURGERY Left     1980    NASAL SEPTUM SURGERY Left        Medications Prior to Admission:      Prior to Admission medications    Medication Sig Start Date End Date Taking? Authorizing Provider   morphine (MS CONTIN) 30 MG extended release tablet Take 1 tablet by mouth 3 times daily for 30 days. 12/7/20 1/6/21 Yes Lalo Benz DO   bumetanide (BUMEX) 1 MG tablet Take 1 tablet by mouth daily 12/4/20  Yes Ashley Haley MD   morphine (MSIR) 15 MG tablet Take 1 tablet by mouth every 4 hours as needed for Pain for up to 30 days.  12/3/20 1/2/21 Yes Lalo Zuleta DO   lidocaine (LIDODERM) 5 % Place 2 patches onto the skin daily 12 hours on, 12 hours off. 12/3/20 1/2/21 Yes Lalo Benz DO   hydrocortisone (ANUSOL-HC) 25 MG suppository Place 1 suppository rectally every 12 hours for 10 days 12/2/20 12/12/20 Yes Ashish Nieto MD   enoxaparin (LOVENOX) 120 MG/0.8ML injection Inject 0.53 mLs into the skin daily 12/2/20 1/1/21 Yes Ashish Nieto MD   sodium chloride 1 g tablet Take 2 tablets by mouth 3 times daily (with meals) 11/26/20  Yes Aneesh Duenas, APRN - CNP   omeprazole (PRILOSEC) 20 MG delayed release capsule Take 1 capsule by mouth daily 11/18/20  Yes Ashish Nieto MD   gabapentin (NEURONTIN) 400 MG capsule Take 400 mg by mouth 3 times daily. Yes Historical Provider, MD   tamsulosin (FLOMAX) 0.4 MG capsule Take 1 capsule by mouth daily 11/10/20  Yes Freddie Yang MD   Magic Mouthwash (MIRACLE MOUTHWASH) Swish and spit 5 mLs 4 times daily as needed for Irritation 1:1:1:1 nystatin, viscous lidocaine, benadryl, maalox 12/7/20   Nani Frausto PA-C   hydrocortisone (ALA-CARA) 1 % cream Apply topically 2 times daily. 12/2/20 12/9/20  Freddie Yang MD   naloxegol (MOVANTIK) 12.5 MG TABS tablet Take 1 tablet by mouth every morning 11/24/20   Jennifer Patrick MD   polyethylene glycol [facility]) 17 GM/SCOOP powder Take 17 g by mouth daily    Historical Provider, MD   docusate sodium (COLACE) 100 MG capsule Take 100 mg by mouth 2 times daily    Historical Provider, MD       Allergies:  Anaprox [naproxen] and Vicodin [hydrocodone-acetaminophen]    Social History:      The patient currently lives at home alone. TOBACCO:   reports that he has been smoking cigarettes. He started smoking about 50 years ago. He has a 50.00 pack-year smoking history. He has never used smokeless tobacco.  ETOH:   reports previous alcohol use. Family History:      Reviewed in detail and positive as follows:        Problem Relation Age of Onset    Cancer Mother     Depression Mother     Heart Disease Mother        Diet:  No diet orders on file    REVIEW OF SYSTEMS:   Pertinent positives as noted in the HPI. All other systems reviewed and negative. Review of Systems - Review of Systems   Constitutional: Positive for activity change, chills and fatigue. Negative for fever. HENT: Positive for mouth sores. Negative for congestion, ear pain, postnasal drip and trouble swallowing. Eyes: Negative for pain and visual disturbance. Respiratory: Negative for cough and shortness of breath. Cardiovascular: Negative for chest pain and palpitations. Gastrointestinal: Positive for abdominal distention, abdominal pain and constipation.  Negative for blood in stool, diarrhea, nausea and vomiting. Genitourinary: Positive for scrotal swelling. Negative for dysuria, frequency and hematuria. Musculoskeletal: Positive for back pain. Skin: Negative for rash and wound. Neurological: Negative for dizziness and headaches. Psychiatric/Behavioral: The patient is not nervous/anxious. PHYSICAL EXAM:    BP (!) 113/58   Pulse 109   Temp 97.8 °F (36.6 °C) (Oral)   Resp 18   Ht 5' 10\" (1.778 m)   Wt 184 lb (83.5 kg)   SpO2 97%   BMI 26.40 kg/m²     General appearance:  Jaundiced, fatigued, frail with temporal wasting, appears older than stated age and cooperative. HEENT:  Temporal wasting, atraumatic without obvious deformity. Pupils pinpoit  Extra ocular muscles intact. Conjunctivae/corneas jaundiced. Neck: Supple, with full range of motion. No jugular venous distention. Trachea midline. Respiratory: Increased respiratory effort. Coarse breath sounds. No wheeze. No crackles. Cardiovascular:  Tachycardic, normal rhythm with normal S1/S2 without murmurs, rubs or gallops. Abdomen: Distended, tender, tympanic to percussion. Edema to belly button. Musculoskeletal:  Pitting edema to umbilicus. (+) CVA tenderness left flank. Skin: Jaundice of face down to neck. No rashes or lesions. Neurologic:  Fine tremor.  Cranial nerves: II-XII intact, grossly non-focal.  Psychiatric:  Alert and oriented, thought content appropriate, normal insight  Capillary Refill: Brisk,< 3 seconds   Peripheral Pulses: +2 palpable, equal bilaterally       Labs:     Recent Labs     12/09/20  0919 12/09/20  1240   WBC 4.8 5.1   HGB 9.1* 9.0*   HCT 27.1* 26.2*   PLT 98* 90*     Recent Labs     12/08/20  1445 12/09/20  0919 12/09/20  1240    133* 132*   K 4.3 4.1 3.9   CL 98  --  94*   CO2 27  --  28   BUN 31*  --  28*   CREATININE 0.7 1.0 0.6   CALCIUM 8.1*  --  8.0*     Recent Labs     12/09/20  0919 12/09/20  1240   * 121*   * 163*   BILIDIR 6.9*  --    BILITOT 8.1* 8.2* ALKPHOS 512* 497*     Recent Labs     12/09/20  1240   INR 1.98*     No results for input(s): CKTOTAL, TROPONINI in the last 72 hours. Urinalysis:    No results found for: NITRU, WBCUA, BACTERIA, RBCUA, BLOODU, SPECGRAV, GLUCOSEU    Intake & Output:  No intake/output data recorded. No intake/output data recorded. Radiology:   CT-scan of the abdomen:   1. 4.6 cm irregular hypodense mass at the junction of the body and tail the pancreas as evidence for recurrent malignancy. 2. Numerous hepatic metastases and possible splenic metastasis. 3. Mesenteric and omental metastases with moderate ascites. 4. Anasarca. EKG:  I have reviewed the EKG with the following interpretation: None    CT ABDOMEN PELVIS W IV CONTRAST Additional Contrast? None   Final Result       1. 4.6 cm irregular hypodense mass at the junction of the body and tail the pancreas as evidence for recurrent malignancy. 2. Numerous hepatic metastases and possible splenic metastasis. 3. Mesenteric and omental metastases with moderate ascites. 4. Anasarca. **This report has been created using voice recognition software. It may contain minor errors which are inherent in voice recognition technology. **      Final report electronically signed by Dr. Nell Ulloa MD on 12/9/2020 3:00 PM               DVT prophylaxis: [x] Lovenox                                 [] SCDs                                 [] SQ Heparin                                 [] Encourage ambulation           [] Already on Anticoagulation    Code Status: Prior      PT/OT Eval Status: None     Disposition:    [] Home       [] TCU       [] Rehab       [] Psych       [] SNF       [] [place]       [x] Other-Admit       Thank you Raquel Granda MD for the opportunity to be involved in this patient's care.     Electronically signed by Zoila Lorenzo DO on 12/9/2020 at 3:38 PM

## 2020-12-09 NOTE — PLAN OF CARE
Problem: Pain:  Description: Pain management should include both nonpharmacologic and pharmacologic interventions. Goal: Pain level will decrease  Description: Pain level will decrease  Outcome: Met This Shift  Goal: Control of acute pain  Description: Control of acute pain  Outcome: Met This Shift  Note: Patient rating pain a 5 out of 10 using HARESH scale, Pain located in mouth/tongue. Goal: Control of chronic pain  Description: Control of chronic pain  Outcome: Met This Shift  Intervention: Opioid analgesia side-effects  Description: Opioid analgesia side-effects - REMINDER(s):  Bradycardia. Confusion. Constipation. Respiratory function, decreased. Note: Patient encouraged to take prescribed pain medications and call Physician if pain is not controlled. Problem: Musculor/Skeletal Functional Status  Goal: Absence of falls  Outcome: Met This Shift  Note: Patient free from falls while in O.P. Oncology. Intervention: Fall precautions  Note: Patient assessed for fall risk on admission to [address] [place] [address]. Fall band placed on patient. Discussed the need to use the call light for assistance prior to getting up out of chair/bed. Problem: Intellectual/Education/Knowledge Deficit  Goal: Teaching initiated upon admission  Outcome: Met This Shift  Note: Patient verbalizes understanding to verbal information given on medical condition and need to go to ED ,action and possible side effects. Aware to call MD if develop complications. Goal: Written Disposition Instruction form completed  Outcome: Met This Shift  Intervention: Verbal/written education provided  Note: Medical condition reviewed, patient verbalizes understanding of medication being administered and potential side effects.       Problem: Discharge Planning  Goal: Knowledge of discharge instructions  Description: Knowledge of discharge instructions  Outcome: Met This Shift  Note: Verbalized understanding of discharge instructions, follow-up appointments, and when to call the physician. Intervention: Interaction with patient/family and care team  Note: Discuss understanding of discharge instructions,follow-up appointments, and when to call the physician. Care plan reviewed with patient. Patient verbalize understanding of the plan of care and contribute to goal setting.

## 2020-12-09 NOTE — PROGRESS NOTES
Pt admitted to  [facility] via from ED via cart. Complaints: Jaundice. INT to [facility]. Vital signs obtained. Assessment and data collection initiated. Two nurse skin assessment performed by Laina Hill and Annabel Buckley RN. Oriented to room. Policies and procedures for  explained. All questions answered with no further questions at this time. Fall prevention and safety brochure discussed with patient. Bed alarm on. Call light in reach. Oriented to room.    Kay Cosby RN 12/9/2020 5:06 PM

## 2020-12-09 NOTE — ED PROVIDER NOTES
Emergency [facility]       Chief Complaint   Patient presents with    Leg Swelling     bilateral, chronic but worsening    Jaundice    Other     sent over by PCP for liver work-up       Nurses Notes reviewed and I agree except as noted in the HPI. HISTORY OF PRESENT ILLNESS    Anika Funes is a 76 y.o. male who presents with complaint of bilateral lower extremity swelling, worsening jaundice. Patient recently diagnosed with pancreatic cancer, has had 2 rounds of chemotherapy. Saw his oncologist today who advised him to come to the ER to be evaluated for developing/worsening jaundice. Patient states that he has chronic mild abdominal pain. He also reports that bilateral lower extremity edema that is getting worse, the edema is extending into his scrotum. Onset: Acute on chronic  Duration: Probably 2 weeks  Timing: Constant  Location of Pain: Abdominal pain  Intesity/severity: Mild with pain medicine  Modifying Factors: History of pancreatic cancer  Relieved by;  Previous Episodes; Tx Before arrival: None  REVIEW OF SYSTEMS      Review of Systems   Constitutional: Negative for fever, chills, diaphoresis and fatigue. HENT: Negative for congestion, drooling, facial swelling and sore throat. Eyes: Negative for photophobia, pain and discharge. Respiratory: Negative for cough, shortness of breath, wheezing and stridor. Cardiovascular: Negative for chest pain, palpitations and leg swelling. Gastrointestinal: Positive for abdominal pain; worsening jaundice; negative for blood in stool and abdominal distention. Genitourinary: Negative for dysuria, urgency, hematuria and difficulty urinating. Positive for scrotal edema  Musculoskeletal: Negative for gait problem, neck pain and neck stiffness. Positive for bilateral extremity edema  Skin; No rash, No itching  Neurological: Negative for seizures, weakness and numbness.    Psychiatric/Behavioral: Negative for hallucinations, confusion and agitation. PAST MEDICAL HISTORY    has a past medical history of Anxiety, Arthritis, Cancer ([facility] [address] Chronic skin ulcer ([facility] [address] Hyperlipidemia, Pancreatic cancer ([facility] [address] [facility] and Sleep apnea. SURGICAL HISTORY      has a past surgical history that includes Appendectomy; Nasal septum surgery (Left); Knee cartilage surgery (Right, 1977); knee surgery (Left); and back surgery. CURRENT MEDICATIONS       Current Discharge Medication List      CONTINUE these medications which have NOT CHANGED    Details   morphine (MS CONTIN) 30 MG extended release tablet Take 1 tablet by mouth 3 times daily for 30 days. Qty: 90 tablet, Refills: 0    Comments: Reduce doses taken as pain becomes manageable  Associated Diagnoses: Pancreatic cancer metastasized to liver ([facility] [address] Cancer associated pain      bumetanide (BUMEX) 1 MG tablet Take 1 tablet by mouth daily  Qty: 60 tablet, Refills: 0      morphine (MSIR) 15 MG tablet Take 1 tablet by mouth every 4 hours as needed for Pain for up to 30 days. Qty: 120 tablet, Refills: 0    Comments: Reduce doses taken as pain becomes manageable  Associated Diagnoses: Pancreatic cancer metastasized to liver ([facility] [address] Chemotherapy management, encounter for      lidocaine (LIDODERM) 5 % Place 2 patches onto the skin daily 12 hours on, 12 hours off. Qty: 60 patch, Refills: 0      hydrocortisone (ANUSOL-HC) 25 MG suppository Place 1 suppository rectally every 12 hours for 10 days  Qty: 20 suppository, Refills: 1      enoxaparin (LOVENOX) 120 MG/0.8ML injection Inject 0.53 mLs into the skin daily  Qty: 30 Syringe, Refills: 1      sodium chloride 1 g tablet Take 2 tablets by mouth 3 times daily (with meals)  Qty: 90 tablet, Refills: 0      omeprazole (PRILOSEC) 20 MG delayed release capsule Take 1 capsule by mouth daily  Qty: 30 capsule, Refills: 3      gabapentin (NEURONTIN) 400 MG capsule Take 400 mg by mouth 3 times daily.       tamsulosin (FLOMAX) 0.4 MG capsule Take 1 capsule by mouth daily  Qty: 90 capsule, Refills: 1      Magic Mouthwash (MIRACLE MOUTHWASH) Swish and spit 5 mLs 4 times daily as needed for Irritation 1:1:1:1 nystatin, viscous lidocaine, benadryl, maalox  Qty: 200 mL, Refills: 1      hydrocortisone (ALA-CARA) 1 % cream Apply topically 2 times daily. Qty: 1 Tube, Refills: 1      naloxegol (MOVANTIK) 12.5 MG TABS tablet Take 1 tablet by mouth every morning  Qty: 10 tablet, Refills: 0      polyethylene glycol (GLYCOLAX) 17 GM/SCOOP powder Take 17 g by mouth daily      docusate sodium (COLACE) 100 MG capsule Take 100 mg by mouth 2 times daily             ALLERGIES     is allergic to anaprox [naproxen] and vicodin [hydrocodone-acetaminophen]. FAMILY HISTORY     He indicated that the status of his mother is unknown.   family history includes Cancer in his mother; Depression in his mother; Heart Disease in his mother. SOCIAL HISTORY      reports that he has been smoking cigarettes. He started smoking about 50 years ago. He has been smoking about 0.00 packs per day for the past 50.00 years. He has never used smokeless tobacco. He reports previous alcohol use. He reports previous drug use. PHYSICAL EXAM     INITIAL VITALS:  height is 5' 10.5\" (1.791 m) and weight is 185 lb 6.4 oz (84.1 kg). His oral temperature is 98.5 °F (36.9 °C). His blood pressure is 116/58 (abnormal) and his pulse is 106. His respiration is 18 and oxygen saturation is 97%. Physical Exam   Constitutional:  well-developed and well-nourished. HENT: Head: Normocephalic, atraumatic, Bilateral external ears normal, Oropharynx mosit, No oral exudates, Nose normal.   Eyes: PERRL, EOMI, Conjunctiva normal, No discharge. Positive for scleral icterus  Neck: Normal range of motion, No tenderness, Supple  Cardiovascular: Normal rate, regular rhythm, S1 normal and S2 normal.  Exam reveals no gallop. Pulmonary/Chest: Effort normal and breath sounds normal. No accessory muscle usage or stridor.  No following components:    INR 1.98 (*)     All other components within normal limits   OSMOLALITY - Abnormal; Notable for the following components:    Osmolality Calc 270.6 (*)     All other components within normal limits   CULTURE, URINE   LIPASE   AMMONIA   BRAIN NATRIURETIC PEPTIDE   TROPONIN   ANION GAP   GLOMERULAR FILTRATION RATE, ESTIMATED   SCAN OF BLOOD SMEAR   URINALYSIS   HEMOGLOBIN AND HEMATOCRIT, BLOOD   HEMOGLOBIN AND HEMATOCRIT, BLOOD   COMPREHENSIVE METABOLIC PANEL W/ REFLEX TO MG FOR LOW K   CBC WITH AUTO DIFFERENTIAL       EMERGENCY DEPARTMENT COURSE:   Vitals:    Vitals:    20 1529 20 1613 20 1630 20 1722   BP: (!) 113/58 112/61 (!) 116/58    Pulse: 109 110 106    Resp: 18  18    Temp: 97.8 °F (36.6 °C)  98.5 °F (36.9 °C)    TempSrc: Oral  Oral    SpO2: 97%  98% 97%   Weight:   185 lb 6.4 oz (84.1 kg)    Height:   5' 10.5\" (1.791 m)      Patient presenting at the behest of his oncologist Dr. Haley Parra for jaundice evaluation. Patient mentally intact, case discussed with hospitalist, patient admitted to the hospitalist.    CRITICAL CARE:       CONSULTS:  Hospitalist    PROCEDURES:  None    FINAL IMPRESSION      1. Jaundice, non-    2. Malignant neoplasm of other parts of pancreas ([facility] [address]    3. Anasarca          DISPOSITION/PLAN   Admitted    PATIENT REFERRED TO:  No follow-up provider specified.     DISCHARGE MEDICATIONS:  Current Discharge Medication List          (Please note that portions of this note were completed with a voice recognition program.  Efforts were made to edit the dictations but occasionally words are mis-transcribed.)    Sabrina Padgett, DO            Sabrina Padgett, DO  [address] [facility], DO  20 [id number]

## 2020-12-10 PROBLEM — E43 SEVERE MALNUTRITION (HCC): Chronic | Status: ACTIVE | Noted: 2020-11-21

## 2020-12-10 LAB
ALBUMIN SERPL-MCNC: 2 G/DL (ref 3.5–5.1)
ALP BLD-CCNC: 520 U/L (ref 38–126)
ALT SERPL-CCNC: 141 U/L (ref 11–66)
ANION GAP SERPL CALCULATED.3IONS-SCNC: 10 MEQ/L (ref 8–16)
AST SERPL-CCNC: 142 U/L (ref 5–40)
BASOPHILS # BLD: 0.5 %
BASOPHILS ABSOLUTE: 0 THOU/MM3 (ref 0–0.1)
BILIRUB SERPL-MCNC: 7.6 MG/DL (ref 0.3–1.2)
BUN BLDV-MCNC: 26 MG/DL (ref 7–22)
CA 19-9: 3802 U/ML (ref 0–35)
CALCIUM SERPL-MCNC: 7.7 MG/DL (ref 8.5–10.5)
CHLORIDE BLD-SCNC: 96 MEQ/L (ref 98–111)
CO2: 26 MEQ/L (ref 23–33)
CREAT SERPL-MCNC: 0.6 MG/DL (ref 0.4–1.2)
EOSINOPHIL # BLD: 4 %
EOSINOPHILS ABSOLUTE: 0.2 THOU/MM3 (ref 0–0.4)
ERYTHROCYTE [DISTWIDTH] IN BLOOD BY AUTOMATED COUNT: 16.9 % (ref 11.5–14.5)
ERYTHROCYTE [DISTWIDTH] IN BLOOD BY AUTOMATED COUNT: 56.5 FL (ref 35–45)
GFR SERPL CREATININE-BSD FRML MDRD: > 90 ML/MIN/1.73M2
GLUCOSE BLD-MCNC: 106 MG/DL (ref 70–108)
HAV IGM SER IA-ACNC: NEGATIVE
HCT VFR BLD CALC: 25.7 % (ref 42–52)
HEMOGLOBIN: 8.8 GM/DL (ref 14–18)
HEPATITIS B CORE IGM ANTIBODY: NEGATIVE
HEPATITIS B SURFACE ANTIGEN: NEGATIVE
HEPATITIS C ANTIBODY: NEGATIVE
IMMATURE GRANS (ABS): 0.08 THOU/MM3 (ref 0–0.07)
IMMATURE GRANULOCYTES: 2 %
LV EF: 63 %
LVEF MODALITY: NORMAL
LYMPHOCYTES # BLD: 11.8 %
LYMPHOCYTES ABSOLUTE: 0.5 THOU/MM3 (ref 1–4.8)
MCH RBC QN AUTO: 31.7 PG (ref 26–33)
MCHC RBC AUTO-ENTMCNC: 34.2 GM/DL (ref 32.2–35.5)
MCV RBC AUTO: 92.4 FL (ref 80–94)
MONOCYTES # BLD: 7.3 %
MONOCYTES ABSOLUTE: 0.3 THOU/MM3 (ref 0.4–1.3)
NUCLEATED RED BLOOD CELLS: 0 /100 WBC
PLATELET # BLD: 102 THOU/MM3 (ref 130–400)
PMV BLD AUTO: 12.6 FL (ref 9.4–12.4)
POTASSIUM REFLEX MAGNESIUM: 4 MEQ/L (ref 3.5–5.2)
RBC # BLD: 2.78 MILL/MM3 (ref 4.7–6.1)
SEG NEUTROPHILS: 74.4 %
SEGMENTED NEUTROPHILS ABSOLUTE COUNT: 3 THOU/MM3 (ref 1.8–7.7)
SODIUM BLD-SCNC: 132 MEQ/L (ref 135–145)
TOTAL PROTEIN: 4.4 G/DL (ref 6.1–8)
WBC # BLD: 4 THOU/MM3 (ref 4.8–10.8)

## 2020-12-10 PROCEDURE — 6370000000 HC RX 637 (ALT 250 FOR IP): Performed by: INTERNAL MEDICINE

## 2020-12-10 PROCEDURE — 86645 CMV ANTIBODY IGM: CPT

## 2020-12-10 PROCEDURE — 99222 1ST HOSP IP/OBS MODERATE 55: CPT | Performed by: INTERNAL MEDICINE

## 2020-12-10 PROCEDURE — 6370000000 HC RX 637 (ALT 250 FOR IP): Performed by: STUDENT IN AN ORGANIZED HEALTH CARE EDUCATION/TRAINING PROGRAM

## 2020-12-10 PROCEDURE — 86663 EPSTEIN-BARR ANTIBODY: CPT

## 2020-12-10 PROCEDURE — P9047 ALBUMIN (HUMAN), 25%, 50ML: HCPCS | Performed by: INTERNAL MEDICINE

## 2020-12-10 PROCEDURE — 1200000003 HC TELEMETRY R&B

## 2020-12-10 PROCEDURE — 2500000003 HC RX 250 WO HCPCS: Performed by: INTERNAL MEDICINE

## 2020-12-10 PROCEDURE — 6360000002 HC RX W HCPCS: Performed by: FAMILY MEDICINE

## 2020-12-10 PROCEDURE — 80053 COMPREHEN METABOLIC PANEL: CPT

## 2020-12-10 PROCEDURE — 6360000002 HC RX W HCPCS: Performed by: STUDENT IN AN ORGANIZED HEALTH CARE EDUCATION/TRAINING PROGRAM

## 2020-12-10 PROCEDURE — 2580000003 HC RX 258: Performed by: INTERNAL MEDICINE

## 2020-12-10 PROCEDURE — 86665 EPSTEIN-BARR CAPSID VCA: CPT

## 2020-12-10 PROCEDURE — 6360000002 HC RX W HCPCS: Performed by: INTERNAL MEDICINE

## 2020-12-10 PROCEDURE — 80074 ACUTE HEPATITIS PANEL: CPT

## 2020-12-10 PROCEDURE — 93306 TTE W/DOPPLER COMPLETE: CPT

## 2020-12-10 PROCEDURE — 85025 COMPLETE CBC W/AUTO DIFF WBC: CPT

## 2020-12-10 PROCEDURE — 86664 EPSTEIN-BARR NUCLEAR ANTIGEN: CPT

## 2020-12-10 PROCEDURE — 2580000003 HC RX 258: Performed by: FAMILY MEDICINE

## 2020-12-10 PROCEDURE — 36415 COLL VENOUS BLD VENIPUNCTURE: CPT

## 2020-12-10 PROCEDURE — 2580000003 HC RX 258: Performed by: STUDENT IN AN ORGANIZED HEALTH CARE EDUCATION/TRAINING PROGRAM

## 2020-12-10 PROCEDURE — 99232 SBSQ HOSP IP/OBS MODERATE 35: CPT | Performed by: FAMILY MEDICINE

## 2020-12-10 RX ORDER — ALBUMIN (HUMAN) 12.5 G/50ML
25 SOLUTION INTRAVENOUS EVERY 8 HOURS
Status: DISPENSED | OUTPATIENT
Start: 2020-12-10 | End: 2020-12-12

## 2020-12-10 RX ORDER — CYCLOBENZAPRINE HCL 10 MG
5 TABLET ORAL 3 TIMES DAILY PRN
Status: DISCONTINUED | OUTPATIENT
Start: 2020-12-10 | End: 2020-12-14 | Stop reason: HOSPADM

## 2020-12-10 RX ORDER — BUMETANIDE 0.25 MG/ML
2 INJECTION, SOLUTION INTRAMUSCULAR; INTRAVENOUS ONCE
Status: COMPLETED | OUTPATIENT
Start: 2020-12-10 | End: 2020-12-10

## 2020-12-10 RX ORDER — POTASSIUM CHLORIDE 20 MEQ/1
40 TABLET, EXTENDED RELEASE ORAL ONCE
Status: COMPLETED | OUTPATIENT
Start: 2020-12-10 | End: 2020-12-10

## 2020-12-10 RX ADMIN — CYCLOBENZAPRINE 5 MG: 10 TABLET, FILM COATED ORAL at 13:54

## 2020-12-10 RX ADMIN — MORPHINE SULFATE 30 MG: 30 TABLET, FILM COATED, EXTENDED RELEASE ORAL at 08:36

## 2020-12-10 RX ADMIN — MORPHINE SULFATE 15 MG: 15 TABLET ORAL at 18:29

## 2020-12-10 RX ADMIN — PIPERACILLIN AND TAZOBACTAM 3.38 G: 3; .375 INJECTION, POWDER, LYOPHILIZED, FOR SOLUTION INTRAVENOUS at 21:23

## 2020-12-10 RX ADMIN — POTASSIUM CHLORIDE 40 MEQ: 1500 TABLET, EXTENDED RELEASE ORAL at 08:36

## 2020-12-10 RX ADMIN — MORPHINE SULFATE 15 MG: 15 TABLET ORAL at 04:12

## 2020-12-10 RX ADMIN — PIPERACILLIN AND TAZOBACTAM 3.38 G: 3; .375 INJECTION, POWDER, LYOPHILIZED, FOR SOLUTION INTRAVENOUS at 10:50

## 2020-12-10 RX ADMIN — PIPERACILLIN AND TAZOBACTAM 3.38 G: 3; .375 INJECTION, POWDER, LYOPHILIZED, FOR SOLUTION INTRAVENOUS at 02:42

## 2020-12-10 RX ADMIN — Medication 5 ML: at 04:12

## 2020-12-10 RX ADMIN — SODIUM CHLORIDE TAB 1 GM 2 G: 1 TAB at 18:22

## 2020-12-10 RX ADMIN — MORPHINE SULFATE 30 MG: 30 TABLET, FILM COATED, EXTENDED RELEASE ORAL at 13:54

## 2020-12-10 RX ADMIN — Medication 5 ML: at 09:02

## 2020-12-10 RX ADMIN — SODIUM CHLORIDE TAB 1 GM 2 G: 1 TAB at 12:33

## 2020-12-10 RX ADMIN — ALBUMIN (HUMAN) 25 G: 0.25 INJECTION, SOLUTION INTRAVENOUS at 08:37

## 2020-12-10 RX ADMIN — MORPHINE SULFATE 15 MG: 15 TABLET ORAL at 08:50

## 2020-12-10 RX ADMIN — NALOXEGOL OXALATE 12.5 MG: 12.5 TABLET, FILM COATED ORAL at 08:36

## 2020-12-10 RX ADMIN — BUMETANIDE 2 MG: 0.25 INJECTION INTRAMUSCULAR; INTRAVENOUS at 08:36

## 2020-12-10 RX ADMIN — ENOXAPARIN SODIUM 120 MG: 120 INJECTION SUBCUTANEOUS at 12:33

## 2020-12-10 RX ADMIN — DOCUSATE SODIUM 100 MG: 100 CAPSULE, LIQUID FILLED ORAL at 21:23

## 2020-12-10 RX ADMIN — GABAPENTIN 400 MG: 400 CAPSULE ORAL at 13:54

## 2020-12-10 RX ADMIN — GABAPENTIN 400 MG: 400 CAPSULE ORAL at 21:24

## 2020-12-10 RX ADMIN — BUMETANIDE 0.5 MG/HR: 0.25 INJECTION INTRAMUSCULAR; INTRAVENOUS at 10:50

## 2020-12-10 RX ADMIN — HYDROMORPHONE HYDROCHLORIDE 0.5 MG: 1 INJECTION, SOLUTION INTRAMUSCULAR; INTRAVENOUS; SUBCUTANEOUS at 19:59

## 2020-12-10 RX ADMIN — TAMSULOSIN HYDROCHLORIDE 0.4 MG: 0.4 CAPSULE ORAL at 21:24

## 2020-12-10 RX ADMIN — ALBUMIN (HUMAN) 25 G: 0.25 INJECTION, SOLUTION INTRAVENOUS at 16:00

## 2020-12-10 RX ADMIN — MORPHINE SULFATE 30 MG: 30 TABLET, FILM COATED, EXTENDED RELEASE ORAL at 21:58

## 2020-12-10 RX ADMIN — Medication 5 ML: at 13:52

## 2020-12-10 RX ADMIN — SODIUM CHLORIDE, PRESERVATIVE FREE 10 ML: 5 INJECTION INTRAVENOUS at 08:44

## 2020-12-10 RX ADMIN — PIPERACILLIN AND TAZOBACTAM 3.38 G: 3; .375 INJECTION, POWDER, LYOPHILIZED, FOR SOLUTION INTRAVENOUS at 16:00

## 2020-12-10 RX ADMIN — GABAPENTIN 400 MG: 400 CAPSULE ORAL at 08:37

## 2020-12-10 RX ADMIN — PANTOPRAZOLE SODIUM 40 MG: 40 TABLET, DELAYED RELEASE ORAL at 06:51

## 2020-12-10 RX ADMIN — SODIUM CHLORIDE TAB 1 GM 2 G: 1 TAB at 08:36

## 2020-12-10 ASSESSMENT — PAIN DESCRIPTION - LOCATION: LOCATION: BACK

## 2020-12-10 ASSESSMENT — PAIN DESCRIPTION - PAIN TYPE: TYPE: ACUTE PAIN

## 2020-12-10 ASSESSMENT — PAIN SCALES - GENERAL
PAINLEVEL_OUTOF10: 8
PAINLEVEL_OUTOF10: 6
PAINLEVEL_OUTOF10: 6
PAINLEVEL_OUTOF10: 7
PAINLEVEL_OUTOF10: 8
PAINLEVEL_OUTOF10: 5
PAINLEVEL_OUTOF10: 3
PAINLEVEL_OUTOF10: 7

## 2020-12-10 ASSESSMENT — ENCOUNTER SYMPTOMS
BLOOD IN STOOL: 0
VOMITING: 0
NAUSEA: 1
SORE THROAT: 0
SHORTNESS OF BREATH: 0
DIARRHEA: 0
BACK PAIN: 0
EYES NEGATIVE: 1
ABDOMINAL PAIN: 1
COUGH: 0
EYE PAIN: 0

## 2020-12-10 ASSESSMENT — PAIN DESCRIPTION - ONSET: ONSET: ON-GOING

## 2020-12-10 ASSESSMENT — PAIN DESCRIPTION - DIRECTION: RADIATING_TOWARDS: RIGHT

## 2020-12-10 ASSESSMENT — PAIN DESCRIPTION - FREQUENCY: FREQUENCY: CONTINUOUS

## 2020-12-10 ASSESSMENT — PAIN DESCRIPTION - PROGRESSION: CLINICAL_PROGRESSION: NOT CHANGED

## 2020-12-10 ASSESSMENT — PAIN DESCRIPTION - DESCRIPTORS: DESCRIPTORS: ACHING

## 2020-12-10 ASSESSMENT — PAIN - FUNCTIONAL ASSESSMENT: PAIN_FUNCTIONAL_ASSESSMENT: PREVENTS OR INTERFERES SOME ACTIVE ACTIVITIES AND ADLS

## 2020-12-10 ASSESSMENT — PAIN DESCRIPTION - ORIENTATION: ORIENTATION: LOWER

## 2020-12-10 NOTE — PROGRESS NOTES
PROGRESS NOTE      Patient:  Concetta Hawthorne      Unit/Bed:5-15/015-A    YOB: 1955    MRN: [id number]       Acct: [de-identified]     PCP: Srinivasan Oh MD    Date of Admission: 2020      Assessment/Plan:    Active Hospital Problems    Diagnosis Date Noted    Jaundice, non- [R17] 2020     Conjugated hyperbilirubinemia, unclear etiology but suspect intrahepatic cholestasis   -Noted when he presented to oncology clinic 2020; Total bilirubin 8.2  -CT abdomen pelvis \"4.6 cm irregular hypodense mass at the junction of the body and tail of the pancreas, numerous hepatic metastases and possible splenic metastasis, mesenteric and omental mets with moderate ascites\". Concern for obstructive jaundice. However, MRCP demonstrating patent ducts. -LFTs consistent with cholestatic pattern: Alk phos 520, , , lipase normal. Total protein 4.4.   -check  Hepatitis panel, EBV, CMV   -consult to GI      Chronic back pain, due to pancreatic cancer vs. Pyelonephritis   -afebrile but having chills, no urinary symptoms. check UA, (+) CVA tenderness  -history of uncontrolled pain, was seen by pain management. Continue morphine regimen from home.   -Flexeril added q8 hrs for pain based on past pain management recommendations  -Dilaudid q4 prn for severe pain      Anasarca, due to hypoalbuminemia up to level of umbilicus   -due to hypoalbuminemia   -Increased daily bumex to 1 mg BID, nephrology started bumex drip. Appreciate their assistance. -echo today      Transaminitis, likely due to liver metastases vs possible obstruction  -, , Alk phos 497 on admission  -plan as above #1     Normocytic Anemia, of chronic disease   -Hgb 9.0 on admission, baseline 11-12.5.  Stable  -H&H q6 hours x2 occurrences stable at 8.8, CBC daily  -Iron studies : ferritin 991 elevated, iron 31 low, iron saturation 15% low, TIBC 203 wnl.      Thrombocytopenia, unclear etiology, stable  -90 on admission, baseline difficult to establish. Was 1 12/2.-Started lovenox recently, if platelets continue to decrease consider HIT workup   -Repeat CBC stable/improved to 102. Hyponatremia, mild  -gentle IVF first night, then discontinued due to anasarca. Nephrology on board. Appreciate their recommendations.      Pseudohypocalcemia secondary to hypoalbuminemia  -Ca 8.0, Albumin 2.2, corrected calcium 9.3     DVT proximal right femoral vein   -Noted on US 12/2  -Continue therapeutic lovenox     Metastatic Pancreatic Cancer   -diagnosed with pancreatic cancer on 10/27/2020 s/p liver biopsy   -Last dose of palliative chemotherapy (paclitaxel and gemcitabine) last week  -Consult to oncology, palliative care, and consider pain management consult if pain remains uncontrolled      Oral mucositis  -continue magic mouthwash as needed      Prostate cancer, [address] Adenocarcinoma  -noted       Malnutrition, due to malignancy and poor oral intake   -ONS     Code status: Full code, consult to palliative care for goals of care discussion       Chief Complaint: Jaundice, Flank Pain     Hospital Course:   76year old male with past medical history significant for prostate cancer, recent pancreatic cancer, oral mucositis, hyperlipidemia, arthritis and anxiety who presented from oncology office for evaluation of jaundice. He also has recent diagnosis of acute DVT and on therapeutic lovenox. Per office oncology note, \"Bilirubin 8.1 compared to 4.5 on 12/2/20. Alk phos 512, , . Unclear etiology if related intrahepatic ductal dilation versus obstruction from malignancy. Discussed worsening LFT with the patient and his friend today. Discussed with current  LFT, unable to give chemotherapy. Discussed recommendation for ED evaluation and hospital admission for further evaluation and treatment. Discussed if LFT does not improve he will not be a candidate for further chemotherapy. Called report to RITA Mora in the ED. Recommend MRCP to evaluate cause of hyperbilirubinemia and if stent able to be placed if obstruction present. \" He reports that he has had increasing fatigue, swelling of his bilateral legs and scrotum, and left flank plain that feels like, \"hit with a baseball bat,\" and is constant. Denies dysuria symptoms. Denies fevers. Reports chills.      On presentation, his vital signs were , /63, RR 16, 97% on room air. Labs significant for Na 132, BUN 28, Ca 8.0, ionized Ca 1.06, Total protein 4.8. Albumin 2.2, alkaline phosphatase 497, , , total bilirubin 8.2. Hgb 9.0, platelets 90. CT abdomen and pelvis demonstrated, 4.6 cm irregular hypodense mass at the junction of the body and tail of the pancreas, numerous hepatic mets and possible splenic mets, mesenteric and omental mets with moderate ascites, and anasarca. He was admitted to the hospitalists. Please see A&P for further details. Subjective:   Patient seen and examined in his room with no family at the bedside. He reports feeling better than yesterday but is still having back pain, now on the right side more than left. He is feeling overwhelmed and confused about what is happening. He stated that he \"just wants the pain to go away. \" We discussed his labs, imaging, and the plan. He is okay with palliative care to come and talk about his goals. Denies chest pain, SOB, fevers, chills, abdominal pain.       Medications:  Reviewed    Infusion Medications    bumetanide 0.1 mg/mL infusion       Scheduled Medications    albumin human  25 g Intravenous Q8H    docusate sodium  100 mg Oral BID    enoxaparin  1.5 mg/kg Subcutaneous Daily    gabapentin  400 mg Oral TID    hydrocortisone  25 mg Rectal Q12H    lidocaine  1 patch Topical Daily    morphine  30 mg Oral TID    naloxegol  12.5 mg Oral QAM    pantoprazole  40 mg Oral QAM AC    sodium chloride  2 g Oral TID WC    tamsulosin  0.4 mg Oral Daily    sodium chloride flush  10 mL Intravenous 2 times per day    piperacillin-tazobactam  3.375 g Intravenous Q6H    polyethylene glycol  17 g Oral Daily     PRN Meds: magic (miracle) mouthwash, morphine, sodium chloride flush, acetaminophen **OR** acetaminophen, polyethylene glycol, promethazine **OR** ondansetron, potassium chloride **OR** potassium alternative oral replacement **OR** potassium chloride      Intake/Output Summary (Last 24 hours) at 12/10/2020 0920  Last data filed at 12/10/2020 0912  Gross per 24 hour   Intake 30 ml   Output 850 ml   Net -820 ml       Diet:  Dietary Nutrition Supplements: Standard High Calorie Oral Supplement  DIET GENERAL; Daily Fluid Restriction: 1500 ml    Exam:  BP (!) 116/55   Pulse 109   Temp 98.7 °F (37.1 °C) (Oral)   Resp 18   Ht 5' 10.5\" (1.791 m)   Wt 185 lb 6.4 oz (84.1 kg)   SpO2 90%   BMI 26.23 kg/m²     General appearance:  Jaundiced, fatigued, frail with temporal wasting, appears older than stated age and cooperative. HEENT:  Temporal wasting, atraumatic without obvious deformity. Pupils pinpoit  Extra ocular muscles intact. Conjunctivae/corneas jaundiced. Neck: Supple, with full range of motion. No jugular venous distention. Trachea midline. Respiratory: Increased respiratory effort. Coarse breath sounds. No wheeze. No crackles. Cardiovascular:  Tachycardic, normal rhythm with normal S1/S2 without murmurs, rubs or gallops. Abdomen: Distended, tender, tympanic to percussion. Edema to belly button. Musculoskeletal:  Pitting edema to greater trochanter bilaterally   Skin: Jaundice of face down to neck. No rashes or lesions. Neurologic:  Fine tremor.  Cranial nerves: II-XII intact, grossly non-focal.  Psychiatric:  Alert and oriented, thought content appropriate, normal insight  Capillary Refill: Brisk,< 3 seconds   Peripheral Pulses: +2 palpable, equal bilaterally     Labs:   Recent Labs     12/09/20  0919 12/09/20  1240 12/09/20  2221 12/10/20  0408   WBC 4.8 5.1  --  4.0*   HGB 9. 1* 9.0* 8.5* 8.8*   HCT 27.1* 26.2* 25.0* 25.7*   PLT 98* 90*  --  102*     Recent Labs     12/08/20  1445 12/09/20  0919 12/09/20  1240 12/10/20  0408    133* 132* 132*   K 4.3 4.1 3.9 4.0   CL 98  --  94* 96*   CO2 27  --  28 26   BUN 31*  --  28* 26*   CREATININE 0.7 1.0 0.6 0.6   CALCIUM 8.1*  --  8.0* 7.7*     Recent Labs     12/09/20  0919 12/09/20  1240 12/10/20  0408   * 121* 142*   * 163* 141*   BILIDIR 6.9*  --   --    BILITOT 8.1* 8.2* 7.6*   ALKPHOS 512* 497* 520*     Recent Labs     12/09/20  1240   INR 1.98*     No results for input(s): CKTOTAL, TROPONINI in the last 72 hours. Urinalysis:      Lab Results   Component Value Date    NITRU see below 12/09/2020    WBCUA 0-2 12/09/2020    BACTERIA NONE SEEN 12/09/2020    RBCUA 3-5 12/09/2020    BLOODU NEGATIVE 12/09/2020    SPECGRAV >1.030 12/09/2020       Radiology:  MRI ABDOMEN WO CONTRAST MRCP   Final Result       1. Redemonstration of prominent pancreatic mass in the body and tail with multiple metastatic lesions in the liver and omentum with moderate ascites. 2. No ductal dilatation or filling defect identified. **This report has been created using voice recognition software. It may contain minor errors which are inherent in voice recognition technology. **      Final report electronically signed by Dr. Caden Kessler MD on 12/9/2020 6:25 PM      CT ABDOMEN PELVIS W IV CONTRAST Additional Contrast? None   Final Result       1. 4.6 cm irregular hypodense mass at the junction of the body and tail the pancreas as evidence for recurrent malignancy. 2. Numerous hepatic metastases and possible splenic metastasis. 3. Mesenteric and omental metastases with moderate ascites. 4. Anasarca. **This report has been created using voice recognition software. It may contain minor errors which are inherent in voice recognition technology. **      Final report electronically signed by Dr. Caden Kessler MD

## 2020-12-10 NOTE — CONSULTS
Chief Complaint:  Obstructive jaundice    History of present Illness: Evi Doan is a 76 y.o. male, new to our practice, admitted to [address] [place] [address] on 12/9/2020 with jaundice. The patient was recently diagnosed with pancreatic cancer with liver metastatic disease. He is following with Dr Tricia Tucker from Hematology/Oncology. He also has history of prostate cancer remotely. The patient denies family history of GI cancers or disorders. He is noted to be fatigued throughout our interaction; keeps his eyes closed for most of the interaction with me. He is not a good historian; unable to recall dates or procedures. He reports occasional confusion, citing \"it comes with age, I guess. \"  The patient complains of abdominal pain throughout the abdomen, is noted to be taking morphine for this. He had CT and MRCP on admission - see results below. He reports occasional nausea with dry heaves. Has vomited x 1 - denies hematemesis or coffee ground emesis. He complains of acid reflux and is noted to be taking omeprazole prior to admission. He denies dysphagia or odynophagia. He has complaints of constipation. He is noted to be taking Movantik, Miralax and Colace daily. He complains of rectal discomfort and \"hemorrhoid. \"  He states the hemorrhoid has been bleeding \"off and on all week. \"  He reports colonoscopy in the past in [place], [place]. He is unable to tell me when this took place. He endorses having hemorrhoid banding done in the past, also in [facility]. Past Medical History:  has a past medical history of Anxiety, Arthritis, Cancer ([address] Chronic skin ulcer ([address] Hyperlipidemia, Pancreatic cancer ([address] Prostate cancer ([address] and Sleep apnea.     Past Surgical History:   Past Surgical History:   Procedure Laterality Date    APPENDECTOMY      BACK SURGERY      KNEE CARTILAGE SURGERY Right 1977    KNEE SURGERY Left     1980    NASAL SEPTUM SURGERY Left        Social History:  reports that he has been smoking cigarettes. He started smoking about 50 years ago. He has been smoking about 0.00 packs per day for the past 50.00 years. He has never used smokeless tobacco. He reports previous alcohol use. He reports previous drug use. Family History: family history includes Cancer in his mother; Depression in his mother; Heart Disease in his mother.     Review of Systems:   -History obtained from chart review and the patient  General ROS: positive for  - fatigue  negative for - chills, fever, hot flashes, malaise, night sweats, sleep disturbance, weight gain or weight loss  Psychological ROS: negative  ENT ROS: positive for - oral lesions  negative for - epistaxis, headaches, hearing change, nasal congestion, nasal discharge, sinus pain, sneezing, sore throat, tinnitus, vertigo, visual changes or vocal changes  Hematological and Lymphatic ROS: positive for - blood clots, fatigue and jaundice  negative for - bleeding problems, blood transfusions, bruising, night sweats, pallor, swollen lymph nodes or weight loss  Endocrine ROS: negative  Respiratory ROS: positive for - cough and shortness of breath  negative for - hemoptysis, orthopnea, pleuritic pain, sputum changes, stridor, tachypnea or wheezing  Cardiovascular ROS: positive for - dyspnea on exertion, edema and shortness of breath  negative for - chest pain, irregular heartbeat, loss of consciousness, murmur, orthopnea, palpitations or rapid heart rate  Gastrointestinal ROS: positive for - abdominal pain, change in bowel habits, constipation, heartburn and nausea/vomiting  negative for - appetite loss, blood in stools, change in stools, diarrhea, hematemesis, melena, stool incontinence or swallowing difficulty/pain  Genito-Urinary ROS: positive for - change in urinary stream (slow to start)  negative for - dysuria, hematuria, nocturia or urinary frequency/urgency  Musculoskeletal ROS: negative  Neurological ROS: positive for - confusion  negative for - behavioral lidocaine, benadryl, maalox 12/7/20   Nani Frausto PA-C   naloxegol (MOVANTIK) 12.5 MG TABS tablet Take 1 tablet by mouth every morning 11/24/20   Jennifer Patrick MD   polyethylene glycol [facility]) 17 GM/SCOOP powder Take 17 g by mouth daily    Historical Provider, MD   docusate sodium (COLACE) 100 MG capsule Take 100 mg by mouth 2 times daily    Historical Provider, MD       Current Meds:  Current Facility-Administered Medications:     bumetanide (BUMEX) 12.5 mg in sodium chloride 0.9 % 125 mL infusion, 0.5 mg/hr, Intravenous, Continuous, Kiarra Canela MD, Last Rate: 5 mL/hr at 12/10/20 1050, 0.5 mg/hr at 12/10/20 1050    albumin human 25 % IV solution 25 g, 25 g, Intravenous, Q8H, Kiarra Canela MD, Stopped at 12/10/20 1700    cyclobenzaprine (FLEXERIL) tablet 5 mg, 5 mg, Oral, TID PRN, Quintin Guzman DO, 5 mg at 12/10/20 1354    HYDROmorphone (DILAUDID) injection 0.5 mg, 0.5 mg, Intravenous, Q4H PRN, Mary Ann Guzman DO    docusate sodium (COLACE) capsule 100 mg, 100 mg, Oral, BID, Mary Ann Guzman DO, 100 mg at 12/09/20 2118    enoxaparin (LOVENOX) injection 120 mg, 1.5 mg/kg, Subcutaneous, Daily, Mary Ann Guzman DO, 120 mg at 12/10/20 1233    gabapentin (NEURONTIN) capsule 400 mg, 400 mg, Oral, TID, Quintin Guzman DO, 400 mg at 12/10/20 1354    hydrocortisone (ANUSOL-HC) suppository 25 mg, 25 mg, Rectal, Q12H, Mary Ann Guzman DO    lidocaine 4 % external patch 1 patch, 1 patch, Topical, Daily, [facility], DO, 1 patch at 12/10/20 8102    magic (miracle) mouthwash, 5 mL, Swish & Spit, 4x Daily PRN, Mary Ann Guzman DO, 5 mL at 12/10/20 1352    morphine (MS CONTIN) extended release tablet 30 mg, 30 mg, Oral, TID, Mary Ann Guzman DO, 30 mg at 12/10/20 1354    morphine (MSIR) tablet 15 mg, 15 mg, Oral, Q4H PRN, Mary Ann Guzman DO, 15 mg at 12/10/20 0850    naloxegol (MOVANTIK) tablet 12.5 mg, 12.5 mg, Oral, QAM, Mray Ann Guzman DO, 12.5 mg at 12/10/20 0836    male who appears undernourished. Pt is lying comfortably in bed. Pt keeps eyes closed during most of encounter. HEENT: Atraumatic, Pupils reactive, No pallor.  (+) icterus bilaterally. Oral mucosa dry. No thrush. Neck: No thyroid enlargement, No cervical or supraclavicular lymphadenopathy  CVS: Regular rate and rhythm, No murmurs. No rubs or gallops  RS: Good b/l air entry, Clear to auscultation b/l  Abd: taut, diffuse tenderness t/o, distended, no visible veins, scars, No hepatosplenomegaly or palpable masses, bowel sounds active. (+) ascites. Erythema noted. Anasarca to lower abdomen. Ext: No clubbing, cyanosis. BLE edema extending to thighs.   Redness and multiple scabs to BLE.  CNS: alert, oriented, no gross focal motor deficits    Labs:   Lab Results   Component Value Date    WBC 4.0 12/10/2020    HGB 8.8 12/10/2020    HCT 25.7 12/10/2020    MCV 92.4 12/10/2020     12/10/2020     Lab Results   Component Value Date     12/10/2020    K 4.0 12/10/2020    CL 96 12/10/2020    CO2 26 12/10/2020    BUN 26 12/10/2020    CREATININE 0.6 12/10/2020    GLUCOSE 106 12/10/2020    CALCIUM 7.7 12/10/2020     Lab Results   Component Value Date    ALKPHOS 520 12/10/2020     12/10/2020     12/10/2020    PROT 4.4 12/10/2020    BILITOT 7.6 12/10/2020    BILIDIR 6.9 12/09/2020    LABALBU 2.0 12/10/2020     No results found for: LACTA  No results found for: AMYLASE  Lab Results   Component Value Date    LIPASE 10.1 12/09/2020     Lab Results   Component Value Date    INR 1.98 12/09/2020       Radiology:  PROCEDURE: MRI ABDOMEN WO CONTRAST MRCP         CLINICAL INFORMATION: pancreatic mass, obstructive jaundice .         COMPARISON: CT abdomen pelvis from the same date.         TECHNIQUE: Coronal and axial T2 haste, axial T2 with fat saturation and axial 3-D and out of phase along with coronal T2 3-D thin and axial T1 vibe images of the abdomen.         FINDINGS: There is again noted to be an ill-defined mass involving the body and tail of the pancreas measuring roughly 5.5 x 3.6 cm. The gallbladder is normal in appearance. The cystic duct is patent. The hepatic ducts appear patent. The common duct is    also patent without focal filling defect. No pancreatic ductal dilatation is identified. Redemonstration of numerous liver lesions which are hyperintense T2 signal. There are multiple omental nodules and prominent mesenteric lymph nodes, similar to prior     CT. There is moderate ascites, stable compared to prior CT.              Impression         1. Redemonstration of prominent pancreatic mass in the body and tail with multiple metastatic lesions in the liver and omentum with moderate ascites. 2. No ductal dilatation or filling defect identified.                        **This report has been created using voice recognition software. It may contain minor errors which are inherent in voice recognition technology. **         Final report electronically signed by Dr. Lisy Sierra MD on 12/9/2020 6:25 PM     PROCEDURE: CT ABDOMEN PELVIS W IV CONTRAST         CLINICAL INFORMATION: jaundice, hx of pancreatic ca . Back pain.         COMPARISON: CT abdomen pelvis dated 11/6/2009.         TECHNIQUE: Axial 5 mm CT images were obtained through the abdomen and pelvis after the administration of 80 mL [id number]-[id number] injected in the left AC. Coronal and sagittal reconstructions were created.         All CT scans at this facility use dose modulation, iterative reconstruction, and/or weight-based dosing when appropriate to reduce radiation dose to as low as reasonably achievable.         FINDINGS:    Aleshia Wilhelm is mild atelectasis at the bilateral lung bases. Visualized portions of lungs are otherwise clear.  The visualized portion of the heart is unremarkable.         There are numerous hypodense lesions throughout the liver which have appeared in the interval since prior exam. There is moderate ascites which has also appeared in the interval. The gallbladder is unremarkable. Adrenal glands are unremarkable. Kidneys    are normal in appearance. There are calcified granulomas in the spleen. There is a 1.1 cm hypodense lesion in the spleen near the hilum which has appeared in the interval. There is an irregular hypodense mass at the junction of the body and tail of the    pancreas measuring 4.6 x 3.8 cm in greatest axial dimensions. There are prominent mesenteric lymph nodes centrally. There are multiple nodules and hypodense masses along the omentum.         There is fluid in the rectum and remainder of the colon. There are long segments of wall thickening in the small bowel. The bladder is unremarkable. The prostate appears to be surgically absent. There is prominent subcutaneous edema in the fat about the    pelvis and visualized lower extremities. There are laminectomies at L3-4 through L5-S1. No suspicious osseous lesion is identified.              Impression         1. 4.6 cm irregular hypodense mass at the junction of the body and tail the pancreas as evidence for recurrent malignancy. 2. Numerous hepatic metastases and possible splenic metastasis. 3. Mesenteric and omental metastases with moderate ascites. 4. Anasarca.              **This report has been created using voice recognition software. It may contain minor errors which are inherent in voice recognition technology. **         Final report electronically signed by Dr. Julieta Luna MD on 12/9/2020 3:00 PM      PROCEDURE: VL DUP LOWER EXTREMITY VENOUS BILATERAL         CLINICAL INFORMATION: Pancreatic cancer metastasized to liver [facility]), Pancreatic cancer metastasized to [facility]), Localized swelling of both lower legs.         COMPARISON: No prior study.         TECHNIQUE: Venous doppler ultrasound was performed of the bilateral lower extremities using gray scale, color flow and spectral doppler imaging.         FINDINGS:      of DEE  10. History of HLD      Thank You Dr. Lonny De Souza DO for allowing me to participate in the care of this patient. Assessment and POC were discussed with Dr Bora Christianson.     Time In Room:  1727  Time Out Room:  1800  Total Dictation Time:  40 min (including chart review)    LISA Alvarado - CNP  12/10/2020  6:18 PM     Patient is seen independently from the nurse practitioner and all  the pertinent data along with physical examination and assessment and plans are all obtained by my self and  Laboratory data, Radiology results, medications all are reviewed by my self and care is discussed extensively with the patient  and the patients nurse and all agree with plan and in addition see orders and plans    Electronically signed by Gene Maldonado MD

## 2020-12-10 NOTE — CONSULTS
[facility]          [facility]        [address]        [place], [address]        [phone]           Inpatient Initial consult note         12/10/2020 7:11 AM    Patient Name:   Anika Funes  YOB: 1955  Primary Care Physician:  Anne-Marie Ling MD     History Obtained From:  patient     Consultation requested by : Rey Acosta MD    requested for  : Evaluation of fluid overload     History of presentingillness   Anika Funes is a 76 y.o.   male with Past Medical History as stated below presented with a chief complaint of Leg Swelling (bilateral, chronic but worsening); Jaundice; and Other (sent over by PCP for liver work-up)   on 12/9/2020 . Patient presented with chief complaints of swelling, bilateral lower extremities and scrotum, very severe, associated with increasing fatigue and left flank pain. This is constant, symptoms very severe patient is on diuretics without much benefit. Patient says he is not eating or drinking well as well. Upon arrival to the ER patient was found to have elevated heart rate of 197% on room air Albumin 2.2 BUN 28 hemoglobin of 9.  CT abdomen and pelvis noted mass at the junction of the body and tail of pancreas and numerous liver metastasis and moderate ascites and anasarca.     Due to his significant fluid overload nephrology has been consulted for further evaluation and management     Past History      Past Medical History:   Diagnosis Date    Anxiety     Arthritis     Cancer ([address]     Chronic skin ulcer ([address]     Hyperlipidemia     Pancreatic cancer ([address]     Prostate cancer ([address]     Sleep apnea      Past Surgical History:   Procedure Laterality Date    APPENDECTOMY      BACK SURGERY      KNEE CARTILAGE SURGERY Right 1977    KNEE SURGERY Left     1980    NASAL SEPTUM SURGERY Left      Social History     Socioeconomic History    Marital status:      Spouse name: Not on file    Number of children: Not on file    Years of education: Not on file    Highest education level: Not on file   Occupational History    Not on file   Social Needs    Financial resource strain: Not on file    Food insecurity     Worry: Not on file     Inability: Not on file    Transportation needs     Medical: Not on file     Non-medical: Not on file   Tobacco Use    Smoking status: Current Every Day Smoker     Packs/day: 0.00     Years: 50.00     Pack years: 0.00     Types: Cigarettes     Start date: 1970    Smokeless tobacco: Never Used    Tobacco comment: SMOKING 2-3 CIGRETTES PER WEEK   Substance and Sexual Activity    Alcohol use: Not Currently    Drug use: Not Currently    Sexual activity: Not Currently     Partners: Female   Lifestyle    Physical activity     Days per week: Not on file     Minutes per session: Not on file    Stress: Not on file   Relationships    Social connections     Talks on phone: Not on file     Gets together: Not on file     Attends Scientologist service: Not on file     Active member of club or organization: Not on file     Attends meetings of clubs or organizations: Not on file     Relationship status: Not on file    Intimate partner violence     Fear of current or ex partner: Not on file     Emotionally abused: Not on file     Physically abused: Not on file     Forced sexual activity: Not on file   Other Topics Concern    Not on file   Social History Narrative    Not on file     Family History   Problem Relation Age of Onset    Cancer Mother     Depression Mother     Heart Disease Mother      Medications & Allergies      Prior to Admission medications    Medication Sig Start Date End Date Taking? Authorizing Provider   morphine (MS CONTIN) 30 MG extended release tablet Take 1 tablet by mouth 3 times daily for 30 days.  12/7/20 1/6/21 Yes Lalo Morales DO   bumetanide (BUMEX) 1 MG tablet Take 1 tablet by mouth daily 12/4/20  Yes Sylvain Barros MD   morphine (MSIR) 15 MG tablet Take 1 tablet by mouth every 4 hours as needed for Pain for up to 30 days. 12/3/20 1/2/21 Yes Lalo Zuleta,    lidocaine (LIDODERM) 5 % Place 2 patches onto the skin daily 12 hours on, 12 hours off. 12/3/20 1/2/21 Yes Lalo Dunlap DO   hydrocortisone (ANUSOL-HC) 25 MG suppository Place 1 suppository rectally every 12 hours for 10 days 12/2/20 12/12/20 Yes Jessenia Pro MD   enoxaparin (LOVENOX) 120 MG/0.8ML injection Inject 0.53 mLs into the skin daily 12/2/20 1/1/21 Yes Jessenia Pro MD   sodium chloride 1 g tablet Take 2 tablets by mouth 3 times daily (with meals) 11/26/20  Yes LISA Walden - CNP   omeprazole (PRILOSEC) 20 MG delayed release capsule Take 1 capsule by mouth daily 11/18/20  Yes Jessenia Pro MD   gabapentin (NEURONTIN) 400 MG capsule Take 400 mg by mouth 3 times daily.    Yes Historical Provider, MD   tamsulosin (FLOMAX) 0.4 MG capsule Take 1 capsule by mouth daily 11/10/20  Yes Jessenia Pro MD   Magic Mouthwash (MIRACLE MOUTHWASH) Swish and spit 5 mLs 4 times daily as needed for Irritation 1:1:1:1 nystatin, viscous lidocaine, benadryl, maalox 12/7/20   Nani Frausto PA-C   naloxegol (MOVANTIK) 12.5 MG TABS tablet Take 1 tablet by mouth every morning 11/24/20   Serge Torres MD   polyethylene glycol (GLYCOLAX) 17 GM/SCOOP powder Take 17 g by mouth daily    Historical Provider, MD   docusate sodium (COLACE) 100 MG capsule Take 100 mg by mouth 2 times daily    Historical Provider, MD     Allergies: Anaprox [naproxen] and Vicodin [hydrocodone-acetaminophen]  IP meds : Scheduled Meds:   bumetanide  1 mg Oral BID    docusate sodium  100 mg Oral BID    enoxaparin  1.5 mg/kg Subcutaneous Daily    gabapentin  400 mg Oral TID    hydrocortisone  25 mg Rectal Q12H    lidocaine  1 patch Topical Daily    morphine  30 mg Oral TID    naloxegol  12.5 mg Oral QAM    pantoprazole  40 mg Oral QAM AC    sodium chloride  2 g Oral TID WC    tamsulosin  0.4 mg Oral Daily    sodium chloride flush  10 mL Intravenous 2 times per day    piperacillin-tazobactam  3.375 g Intravenous Q6H    polyethylene glycol  17 g Oral Daily     Continuous Infusions:   sodium chloride 75 mL/hr at 12/09/20 1817     Review of Systems Physical Exam   Review of Systems   Constitutional: Positive for activity change, appetite change and fatigue. Negative for fever. HENT: Negative. Negative for ear pain and sore throat. Eyes: Negative. Negative for pain. Respiratory: Negative for cough and shortness of breath. Cardiovascular: Positive for leg swelling. Negative for chest pain. Gastrointestinal: Positive for abdominal pain and nausea. Negative for blood in stool, diarrhea and vomiting. Genitourinary: Positive for flank pain. Negative for dysuria, frequency and hematuria. Musculoskeletal: Negative for back pain and neck pain. Skin: Negative for rash and wound. Neurological: Negative for dizziness, light-headedness and headaches. Psychiatric/Behavioral: Negative for agitation and confusion. Physical Exam  Vitals signs reviewed. Constitutional:       General: He is not in acute distress. Appearance: He is ill-appearing. He is not diaphoretic. HENT:      Head: Normocephalic and atraumatic. Right Ear: External ear normal.      Left Ear: External ear normal.      Nose: Nose normal.      Mouth/Throat:      Mouth: Mucous membranes are dry. Eyes:      General: No scleral icterus. Right eye: No discharge. Left eye: No discharge. Conjunctiva/sclera: Conjunctivae normal.   Neck:      Musculoskeletal: Normal range of motion and neck supple. Thyroid: No thyromegaly. Vascular: No JVD. Cardiovascular:      Rate and Rhythm: Regular rhythm. Tachycardia present. Heart sounds: Normal heart sounds. No murmur. Pulmonary:      Effort: Pulmonary effort is normal. No respiratory distress. Breath sounds: Normal breath sounds. No stridor. No wheezing or rales. Chest:      Chest wall: No tenderness. Abdominal:      General: Bowel sounds are normal. There is distension. Palpations: Abdomen is soft. Tenderness: There is no abdominal tenderness. Comments: ascitis noted    Musculoskeletal:         General: Swelling present. No tenderness. Right lower leg: Edema present. Left lower leg: Edema present. Comments: Anasarca up to abdomen   Skin:     General: Skin is warm and dry. Findings: No erythema or rash. Neurological:      General: No focal deficit present. Mental Status: He is alert and oriented to person, place, and time. Psychiatric:         Mood and Affect: Mood normal.         Behavior: Behavior normal.           Vitals:    12/10/20 0320   BP: (!) 121/57   Pulse: 108   Resp: 16   Temp: 98.1 °F (36.7 °C)   SpO2: 92%     Labs, Radiology and Tests       Recent Labs     12/09/20  0919 12/09/20  1240 12/09/20  2221 12/10/20  0408   WBC 4.8 5.1  --  4.0*   RBC 2.89* 2.81*  --  2.78*   HGB 9.1* 9.0* 8.5* 8.8*   HCT 27.1* 26.2* 25.0* 25.7*   MCV 94 93.2  --  92.4   MCH 31.5 32.0  --  31.7   MCHC 33.6 34.4  --  34.2   RDW 16.6*  --   --   --    PLT 98* 90*  --  102*     Recent Labs     12/08/20  1445 12/09/20  0919 12/09/20  1240 12/10/20  0408    133* 132* 132*   K 4.3 4.1 3.9 4.0   CL 98  --  94* 96*   CO2 27  --  28 26   BUN 31*  --  28* 26*   CREATININE 0.7 1.0 0.6 0.6   CALCIUM 8.1*  --  8.0* 7.7*   PROT  --  4.9* 4.8* 4.4*   LABALBU  --  2.4* 2.2* 2.0*   BILITOT  --  8.1* 8.2* 7.6*   ALKPHOS  --  512* 497* 520*   AST  --  124* 121* 142*   ALT  --  166* 163* 141*       Radiology : CT scan of the abdomen and pelvis with IV contrast  1. 4.6 cm irregular hypodense mass at the junction of the body and tail the pancreas as evidence for recurrent malignancy. 2. Numerous hepatic metastases and possible splenic metastasis. 3. Mesenteric and omental metastases with moderate ascites. 4. Anasarca.       Other : Old lab data have been reviewed and noted that the patient weight 2 weeks ago was 163 pounds upon his discharge    Assessment    1 Renal -renal function appears to be stable at baseline slightly elevated BUN/creatinine ratio. 2 Anasarca/fluid overload-clinically very significant. Almost up to the abdomen  ? Exact etiology not clear, obtain an echocardiogram.  Hypoalbuminemia may be contributing to some degree as well  ? Meanwhile discontinue IV fluids. We will start the patient on a Bumex drip  ? Also will augment with some IV albumin to facilitate diuresis    3 Electrolytes -hyponatremia secondary to hypervolemic hyponatremia the diuresis should help  4 Blood pressure maintaining stable  5 Jaundice most likely secondary to obstruction from pancreatic malignancy needs an MRI and GI has been consulted. Elevated liver enzymes noted as well  6 Metastatic pancreatic cancer  7 Meds reviewed and discussed with patient and nursing staff      **This report has been created using voice recognition software. It maycontain minor  errors which are inherent in voice recognition technology. **    Cory Sandra M.D  [facility].

## 2020-12-10 NOTE — CONSULTS
Comprehensive Nutrition Assessment    Type and Reason for Visit:  Initial, Consult(Verbal Consult for Malnutrition)    Nutrition Recommendations/Plan:   *Recommend consider an appetite stimulant and Multivitamin w/minerals daily. *Started Pudding TID and Ice Cream BID. *Continue current diet as ordered. *Continue Magic Mouthwash PRN for mucositis. *Continue Zofran and Phenergan PRN for nausea. Nutrition Assessment: Pt. severely malnourished AEB criteria listed below. At risk for further nutritional compromise r/t admit d/t jaundice/malignany neoplasm of the pancreas, ongoing poor oral intake d/t poor appetite/nausea/mucositis and underlying medical condition (hx prostate and pancreatic cancer s/p chemotherapy one week ago). Nutrition recommendations/interventions as per above. Malnutrition Assessment:  Malnutrition Status:  Severe malnutrition    Context:  Chronic Illness     Findings of the 6 clinical characteristics of malnutrition:  Energy Intake:  7 - 75% or less estimated energy requirements for 1 month or longer  Weight Loss:  Unable to assess(pt with edema/fluids retention so difficult to assess true weight) Body Fat Loss:  7 - Severe body fat loss Triceps, Orbital, Fat Overlying Ribs   Muscle Mass Loss:  7 - Severe muscle mass loss Clavicles (pectoralis & deltoids), Temples (temporalis)  Fluid Accumulation:  (Moderate) Extremities   Strength:  Not Performed    Estimated Daily Nutrient Needs:  Energy (kcal):  8045-4067 kcal/day (20-25 kcal/kg); Weight Used for Energy Requirements:  (84 kg on 12/9)     Protein (g):   g/day (1.3-1.5 g/kg);  Weight Used for Protein Requirements:  (76.8 kg IBW)        Fluid (ml/day):  per MD    Nutrition Related Findings:  admit d/t jaundice; pt with pancreatic cancer s/p chemotherapy one week ago; pt seen; appears cachexic; pt reports poor appetite and intake of meals over the past month consuming very little; pt reports mucositis so he can hardly eat anything d/t the pain - on Magic Mouthwash PRN; pt reports he dislikes Ensure and Magic Cups; pt amenable to Pudding TID and Ice Cream BID; pt reports nausea everyday; Labs: Na 132, Hg 8.8. Rx includes: Bumex, Colace, Movantik, Glycolax, Zofran PRN, Phenergan PRN and Magic Mouthwash PRN. Wounds:  None       Current Nutrition Therapies:    Dietary Nutrition Supplements: Standard High Calorie Oral Supplement  DIET GENERAL; Daily Fluid Restriction: 1500 ml  Dietary Nutrition Supplements: Other Oral Supplement (see comment)  Dietary Nutrition Supplements: Other Oral Supplement (see comment)    Anthropometric Measures:  · Height: 5' 10.5\" (179.1 cm)  · Current Body Weight: 185 lb 6.4 oz (84.1 kg)(12/9; +2 pitting edema BLE)   · Admission Body Weight: 185 lb 6.4 oz (84.1 kg)(12/9; +2 pitting edema BLE)    · Usual Body Weight: 180 lb (81.6 kg)(per pt report. Per EMR: 11/10/20: 154# 6.4 oz; 173 lb 12.8 oz on 12/2/20)     · Ideal Body Weight: 169 lbs  · BMI: 26.2   · BMI Categories: Overweight (BMI 25.0-29. 9)       Nutrition Diagnosis:   · Severe malnutrition, In context of chronic illness related to inadequate protein-energy intake as evidenced by poor intake prior to admission, severe loss of subcutaneous fat, severe muscle loss    Nutrition Interventions:   Food and/or Nutrient Delivery:  Continue Current Diet, Start Oral Nutrition Supplement, Vitamin Supplement  Nutrition Education/Counseling:  Education initiated(Encouraged small, frequent meals at best effort that are high in calories/protein)   Coordination of Nutrition Care:  Continue to monitor while inpatient    Goals:  Pt will consume 75% or more of meals during LOS       Nutrition Monitoring and Evaluation:   Behavioral-Environmental Outcomes:  None Identified   Food/Nutrient Intake Outcomes:  Food and Nutrient Intake, Supplement Intake, Vitamin/Mineral Intake  Physical Signs/Symptoms Outcomes:  Biochemical Data, Nausea or Vomiting, GI Status, Fluid Status or Edema, Weight, Skin, Nutrition Focused Physical Findings     Discharge Planning:     Too soon to determine     Electronically signed by Marques Benoit RD, LD on 12/10/20 at 1:37 PM EST    Contact: [phone]

## 2020-12-10 NOTE — CARE COORDINATION
DISASTER CHARTING    12/10/20, 7:31 AM EST    DISCHARGE ONGOING EVALUATION:     Rama Guy day: 1  Location: [facility]15/015-A Reason for admit: Jaundice [R17]   Barriers to Discharge: To ER with leg swelling, jaundice. Liver workup. Recently dx with pancreatic cancer. Completed 2 rounds of chemo. (Follows with Dr. Soumya Smallwood). Consult Hem/Onc, Nephrology and GI. Palliative Care eval. IVF at 75/hr. Albumin q 8 hrs. Bumex. Zosyn. Lovenox. PCP: Neel Rose MD  Patient Goals/Plan/Treatment Preferences: Met with pt today. He resides with a friend and his ex wife in [address] [place] [address]. His friend is his transportation and is helpful to him. Pt has no current HH services. He states he really could benefit from a wheelchair and he spoke with Dr. Soumya Smallwood previously and she was to write script but states he hasn't received the script. Advised pt that we will work at getting this for him before he leaves and Shyla SALINAS notified regarding this to pass along to one of his providers who may order it. CM will continue to follow for additional needs.

## 2020-12-10 NOTE — PROGRESS NOTES
Patient currently resting quietly with eyes closed. Did not disturb. Discussed with primary RN and patient would like to speak when family is present. This RN will plan on meeting with patient/family tomorrow. Primary RN will confirm a time when family can be present.

## 2020-12-11 LAB
ALBUMIN SERPL-MCNC: 3.1 G/DL (ref 3.5–5.1)
ALP BLD-CCNC: 608 U/L (ref 38–126)
ALT SERPL-CCNC: 127 U/L (ref 11–66)
ANION GAP SERPL CALCULATED.3IONS-SCNC: 13 MEQ/L (ref 8–16)
AST SERPL-CCNC: 125 U/L (ref 5–40)
BASOPHILS # BLD: 0.5 %
BASOPHILS ABSOLUTE: 0 THOU/MM3 (ref 0–0.1)
BILIRUB SERPL-MCNC: 8.9 MG/DL (ref 0.3–1.2)
BUN BLDV-MCNC: 25 MG/DL (ref 7–22)
CALCIUM IONIZED: 0.96 MMOL/L (ref 1.12–1.32)
CALCIUM SERPL-MCNC: 8.1 MG/DL (ref 8.5–10.5)
CHLORIDE BLD-SCNC: 94 MEQ/L (ref 98–111)
CO2: 29 MEQ/L (ref 23–33)
CREAT SERPL-MCNC: 0.9 MG/DL (ref 0.4–1.2)
EOSINOPHIL # BLD: 6.5 %
EOSINOPHILS ABSOLUTE: 0.3 THOU/MM3 (ref 0–0.4)
ERYTHROCYTE [DISTWIDTH] IN BLOOD BY AUTOMATED COUNT: 16.6 % (ref 11.5–14.5)
ERYTHROCYTE [DISTWIDTH] IN BLOOD BY AUTOMATED COUNT: 55.8 FL (ref 35–45)
GFR SERPL CREATININE-BSD FRML MDRD: 84 ML/MIN/1.73M2
GLUCOSE BLD-MCNC: 121 MG/DL (ref 70–108)
HCT VFR BLD CALC: 25.9 % (ref 42–52)
HEMOGLOBIN: 8.9 GM/DL (ref 14–18)
IMMATURE GRANS (ABS): 0.05 THOU/MM3 (ref 0–0.07)
IMMATURE GRANULOCYTES: 1.3 %
LYMPHOCYTES # BLD: 18.5 %
LYMPHOCYTES ABSOLUTE: 0.7 THOU/MM3 (ref 1–4.8)
MCH RBC QN AUTO: 32.2 PG (ref 26–33)
MCHC RBC AUTO-ENTMCNC: 34.4 GM/DL (ref 32.2–35.5)
MCV RBC AUTO: 93.8 FL (ref 80–94)
MONOCYTES # BLD: 12.5 %
MONOCYTES ABSOLUTE: 0.5 THOU/MM3 (ref 0.4–1.3)
NUCLEATED RED BLOOD CELLS: 0 /100 WBC
ORGANISM: ABNORMAL
PLATELET # BLD: 125 THOU/MM3 (ref 130–400)
PMV BLD AUTO: 12 FL (ref 9.4–12.4)
POTASSIUM SERPL-SCNC: 3.7 MEQ/L (ref 3.5–5.2)
RBC # BLD: 2.76 MILL/MM3 (ref 4.7–6.1)
SEG NEUTROPHILS: 60.7 %
SEGMENTED NEUTROPHILS ABSOLUTE COUNT: 2.4 THOU/MM3 (ref 1.8–7.7)
SODIUM BLD-SCNC: 136 MEQ/L (ref 135–145)
TOTAL PROTEIN: 5.4 G/DL (ref 6.1–8)
URINE CULTURE, ROUTINE: ABNORMAL
URINE CULTURE, ROUTINE: ABNORMAL
WBC # BLD: 4 THOU/MM3 (ref 4.8–10.8)

## 2020-12-11 PROCEDURE — 99222 1ST HOSP IP/OBS MODERATE 55: CPT | Performed by: NURSE PRACTITIONER

## 2020-12-11 PROCEDURE — 99233 SBSQ HOSP IP/OBS HIGH 50: CPT | Performed by: INTERNAL MEDICINE

## 2020-12-11 PROCEDURE — 94760 N-INVAS EAR/PLS OXIMETRY 1: CPT

## 2020-12-11 PROCEDURE — 82330 ASSAY OF CALCIUM: CPT

## 2020-12-11 PROCEDURE — 2500000003 HC RX 250 WO HCPCS: Performed by: INTERNAL MEDICINE

## 2020-12-11 PROCEDURE — 2580000003 HC RX 258: Performed by: STUDENT IN AN ORGANIZED HEALTH CARE EDUCATION/TRAINING PROGRAM

## 2020-12-11 PROCEDURE — 2580000003 HC RX 258: Performed by: INTERNAL MEDICINE

## 2020-12-11 PROCEDURE — 80053 COMPREHEN METABOLIC PANEL: CPT

## 2020-12-11 PROCEDURE — P9047 ALBUMIN (HUMAN), 25%, 50ML: HCPCS | Performed by: INTERNAL MEDICINE

## 2020-12-11 PROCEDURE — 6360000002 HC RX W HCPCS: Performed by: INTERNAL MEDICINE

## 2020-12-11 PROCEDURE — 85025 COMPLETE CBC W/AUTO DIFF WBC: CPT

## 2020-12-11 PROCEDURE — 6360000002 HC RX W HCPCS: Performed by: FAMILY MEDICINE

## 2020-12-11 PROCEDURE — 1200000003 HC TELEMETRY R&B

## 2020-12-11 PROCEDURE — 2580000003 HC RX 258: Performed by: FAMILY MEDICINE

## 2020-12-11 PROCEDURE — 99231 SBSQ HOSP IP/OBS SF/LOW 25: CPT | Performed by: FAMILY MEDICINE

## 2020-12-11 PROCEDURE — 6360000002 HC RX W HCPCS: Performed by: STUDENT IN AN ORGANIZED HEALTH CARE EDUCATION/TRAINING PROGRAM

## 2020-12-11 PROCEDURE — 36415 COLL VENOUS BLD VENIPUNCTURE: CPT

## 2020-12-11 PROCEDURE — 6370000000 HC RX 637 (ALT 250 FOR IP): Performed by: STUDENT IN AN ORGANIZED HEALTH CARE EDUCATION/TRAINING PROGRAM

## 2020-12-11 RX ORDER — FLUTICASONE PROPIONATE 50 MCG
1 SPRAY, SUSPENSION (ML) NASAL DAILY
Status: DISCONTINUED | OUTPATIENT
Start: 2020-12-11 | End: 2020-12-14 | Stop reason: HOSPADM

## 2020-12-11 RX ADMIN — BUMETANIDE 0.5 MG/HR: 0.25 INJECTION INTRAMUSCULAR; INTRAVENOUS at 10:45

## 2020-12-11 RX ADMIN — PANTOPRAZOLE SODIUM 40 MG: 40 TABLET, DELAYED RELEASE ORAL at 09:29

## 2020-12-11 RX ADMIN — HYDROMORPHONE HYDROCHLORIDE 0.5 MG: 1 INJECTION, SOLUTION INTRAMUSCULAR; INTRAVENOUS; SUBCUTANEOUS at 10:55

## 2020-12-11 RX ADMIN — PHYTONADIONE 10 MG: 10 INJECTION, EMULSION INTRAMUSCULAR; INTRAVENOUS; SUBCUTANEOUS at 03:44

## 2020-12-11 RX ADMIN — PIPERACILLIN AND TAZOBACTAM 3.38 G: 3; .375 INJECTION, POWDER, LYOPHILIZED, FOR SOLUTION INTRAVENOUS at 21:01

## 2020-12-11 RX ADMIN — MORPHINE SULFATE 15 MG: 15 TABLET ORAL at 16:59

## 2020-12-11 RX ADMIN — BUMETANIDE 0.5 MG/HR: 0.25 INJECTION INTRAMUSCULAR; INTRAVENOUS at 21:01

## 2020-12-11 RX ADMIN — ALBUMIN (HUMAN) 25 G: 0.25 INJECTION, SOLUTION INTRAVENOUS at 09:29

## 2020-12-11 RX ADMIN — GABAPENTIN 400 MG: 400 CAPSULE ORAL at 15:23

## 2020-12-11 RX ADMIN — ENOXAPARIN SODIUM 120 MG: 120 INJECTION SUBCUTANEOUS at 09:29

## 2020-12-11 RX ADMIN — MORPHINE SULFATE 30 MG: 30 TABLET, FILM COATED, EXTENDED RELEASE ORAL at 09:29

## 2020-12-11 RX ADMIN — GABAPENTIN 400 MG: 400 CAPSULE ORAL at 21:01

## 2020-12-11 RX ADMIN — TAMSULOSIN HYDROCHLORIDE 0.4 MG: 0.4 CAPSULE ORAL at 21:01

## 2020-12-11 RX ADMIN — ALBUMIN (HUMAN) 25 G: 0.25 INJECTION, SOLUTION INTRAVENOUS at 00:44

## 2020-12-11 RX ADMIN — PIPERACILLIN AND TAZOBACTAM 3.38 G: 3; .375 INJECTION, POWDER, LYOPHILIZED, FOR SOLUTION INTRAVENOUS at 15:23

## 2020-12-11 RX ADMIN — CALCIUM GLUCONATE 1.5 G: 98 INJECTION, SOLUTION INTRAVENOUS at 10:49

## 2020-12-11 RX ADMIN — NALOXEGOL OXALATE 12.5 MG: 12.5 TABLET, FILM COATED ORAL at 09:29

## 2020-12-11 RX ADMIN — MORPHINE SULFATE 30 MG: 30 TABLET, FILM COATED, EXTENDED RELEASE ORAL at 21:01

## 2020-12-11 RX ADMIN — MORPHINE SULFATE 30 MG: 30 TABLET, FILM COATED, EXTENDED RELEASE ORAL at 15:23

## 2020-12-11 RX ADMIN — SODIUM CHLORIDE TAB 1 GM 2 G: 1 TAB at 12:12

## 2020-12-11 RX ADMIN — ONDANSETRON 4 MG: 2 INJECTION INTRAMUSCULAR; INTRAVENOUS at 15:40

## 2020-12-11 RX ADMIN — GABAPENTIN 400 MG: 400 CAPSULE ORAL at 09:29

## 2020-12-11 RX ADMIN — PIPERACILLIN AND TAZOBACTAM 3.38 G: 3; .375 INJECTION, POWDER, LYOPHILIZED, FOR SOLUTION INTRAVENOUS at 03:43

## 2020-12-11 RX ADMIN — SODIUM CHLORIDE, PRESERVATIVE FREE 10 ML: 5 INJECTION INTRAVENOUS at 09:30

## 2020-12-11 RX ADMIN — DOCUSATE SODIUM 100 MG: 100 CAPSULE, LIQUID FILLED ORAL at 09:29

## 2020-12-11 RX ADMIN — PIPERACILLIN AND TAZOBACTAM 3.38 G: 3; .375 INJECTION, POWDER, LYOPHILIZED, FOR SOLUTION INTRAVENOUS at 09:38

## 2020-12-11 RX ADMIN — SODIUM CHLORIDE TAB 1 GM 2 G: 1 TAB at 09:29

## 2020-12-11 ASSESSMENT — PAIN SCALES - GENERAL
PAINLEVEL_OUTOF10: 0
PAINLEVEL_OUTOF10: 6
PAINLEVEL_OUTOF10: 9
PAINLEVEL_OUTOF10: 0
PAINLEVEL_OUTOF10: 9

## 2020-12-11 ASSESSMENT — PAIN DESCRIPTION - PAIN TYPE: TYPE: CHRONIC PAIN

## 2020-12-11 ASSESSMENT — PAIN DESCRIPTION - LOCATION: LOCATION: BACK

## 2020-12-11 ASSESSMENT — PAIN DESCRIPTION - ORIENTATION: ORIENTATION: MID;LOWER

## 2020-12-11 NOTE — PROGRESS NOTES
[facility]         Renal Inpatient Follow-Up note         12/11/2020 10:14 AM    Pt Name:   Gregoria Moya  YOB: 1955  Attending:   Justyn Cullen MD    Chief Complaint : Gregoria Moya is a 76 y.o. male being followed by nephrology for fluid overload    Interval History :   Patient seen and examined by me. No distress  Feel jossue. C/o swelling, b/l lower extremities, very severe, no associated SOB. On diuretic drip, goood UO and fluid status getting slightly better     Scheduled Medications :    calcium replacement protocol   Other RX Placeholder    calcium gluconate IVPB  1.5 g Intravenous Once    albumin human  25 g Intravenous Q8H    docusate sodium  100 mg Oral BID    enoxaparin  1.5 mg/kg Subcutaneous Daily    gabapentin  400 mg Oral TID    hydrocortisone  25 mg Rectal Q12H    lidocaine  1 patch Topical Daily    morphine  30 mg Oral TID    naloxegol  12.5 mg Oral QAM    pantoprazole  40 mg Oral QAM AC    sodium chloride  2 g Oral TID WC    tamsulosin  0.4 mg Oral Daily    sodium chloride flush  10 mL Intravenous 2 times per day    piperacillin-tazobactam  3.375 g Intravenous Q6H    polyethylene glycol  17 g Oral Daily      bumetanide 0.1 mg/mL infusion 0.5 mg/hr (12/10/20 1050)       Vitals :  BP (!) 115/56   Pulse 112   Temp 98.2 °F (36.8 °C) (Oral)   Resp 16   Ht 5' 10.5\" (1.791 m)   Wt 185 lb 6.4 oz (84.1 kg)   SpO2 93%   BMI 26.23 kg/m²     24HR INTAKE/OUTPUT:      Intake/Output Summary (Last 24 hours) at 12/11/2020 1014  Last data filed at 12/11/2020 0954  Gross per 24 hour   Intake 2820.12 ml   Output 5050 ml   Net -2229.88 ml     Last 3 weights  Wt Readings from Last 3 Encounters:   12/09/20 185 lb 6.4 oz (84.1 kg)   12/09/20 180 lb 8 oz (81.9 kg)   12/09/20 180 lb 8 oz (81.9 kg)           Physical Exam :  General Appearance:  Well developed.  No distress  Mouth/Throat:  Oral mucosa moist  Neck:  Supple, no JVD  Lungs:  Breath sounds: clear  Heart[de-identified]  S1,S2 heard  Abdomen:  Soft, non - tender  Musculoskeletal:  Edema -significant edema bilaterally, anasarca         Last 3 CBC   Recent Labs     12/09/20  1240 12/09/20  2221 12/10/20  0408 12/11/20  0326   WBC 5.1  --  4.0* 4.0*   RBC 2.81*  --  2.78* 2.76*   HGB 9.0* 8.5* 8.8* 8.9*   HCT 26.2* 25.0* 25.7* 25.9*   PLT 90*  --  102* 125*     Last 3 CMP  Recent Labs     12/09/20  1240 12/10/20  0408 12/11/20  0326   * 132* 136   K 3.9 4.0 3.7   CL 94* 96* 94*   CO2 28 26 29   BUN 28* 26* 25*   CREATININE 0.6 0.6 0.9   CALCIUM 8.0* 7.7* 8.1*   LABALBU 2.2* 2.0* 3.1*   BILITOT 8.2* 7.6* 8.9*             ASSESSMENT / Plan     sessment    1. Renal -renal function appears to be stable at baseline slightly elevated BUN/creatinine ratio. -Creatinine slightly has rising to 0.9 however still within reasonable limits follow for now  2. Anasarca/fluid overload-clinically very significant. Almost up to the abdomen  ? Exact etiology not clear,  Hypoalbuminemia may be contributing to some degree as well  ? Decent urine output on a Bumex drip we will continue the Bumex drip for now, fluid restriction. ? We will continue IV albumin for 1 more day     3. Electrolytes -hyponatremia secondary to hypervolemic hyponatremia. Improving with diuresis  4. Blood pressure maintaining stable  5. Jaundice most likely secondary to obstruction from pancreatic malignancy needs an MRI and GI has been consulted. Elevated liver enzymes noted as well  6. Metastatic pancreatic cancer-will be seen by oncology palliative on board  7. Meds reviewed and discussed with patient       SHANA Atkinson D.  [facility].

## 2020-12-11 NOTE — PROGRESS NOTES
Hospice referral completed with Patricia Brennan, his son and DIL in room, and wife Shanta Kuhn over phone. Patient lying in bed and received his MSCONTIN and gabapentin during visit. Rated pain of 8 on scale of back and 9 for abdomen. Hospice concepts, philosophies, and services explained. At beginning of conversation referral simply for information so patient and family able to make informed decision. Discussed hospice care with no return to hospital and no further aggressive treatment. Explained hospice in home as wife said Caffie Sensing been caring for him and going to continue caring for him\". During visit Dr. Angelito Coronel in for short visit to see if questions and how patient doing and then visit by Dr. Vamsi Montoya. Dr. Vamsi Montoya discussed with patient and family options that are available and encouraged them to discuss. Hospice number given and told family to discuss on options and can either call or have floor staff call. Family discussed while nurse on floor and DIL out to zepeda to let nurse know that family wishes to go home with hospice care. Talked with her and will wish for full course of treatment during stay. Told her that will discuss with Dr. Candace Valadez about treatment course and may need to work on pain medication regime. Dr. Candace Valadez ordering comfort parenthesis and voiced that agreeable that needs to work on pain medication regime prior to discharge. Call placed to [facility] and will monitor next 24-48 hrs medication usage and review for changes to assist with patient pain. Getting ready to leave floor and discussion of code status with patient deciding on DNR-CC to allow natural death with no intervention. Does wish for continued treatment as it is but would not want anything else. Dr. Candace Valadez updated and order entered. Will see how weekend goes with pain medication and more than likely discharge on Monday.

## 2020-12-11 NOTE — FLOWSHEET NOTE
12/11/20 1145   Encounter Summary   Services provided to: Patient   Referral/Consult From: Rounding   Continue Visiting Yes  (12/11)   Complexity of Encounter Moderate   Length of Encounter 15 minutes   Routine   Type Initial   Assessment Calm; Approachable   Intervention Nurtured hope   Outcome Comfort   Assessment: In my encounter with the 76 yr old patient, while rounding  the unit 5K,  I provided spiritual care to patient through conversation, I also came to assess the patients spiritual needs present. Interventions:  I provided prayer, emotional support and words of comfort. Outcomes: The patient was encouraged and didnt share any further spiritual needs at this time. The pt remains optimistic and hopeful. The pt shared that they were appreciative for the support. Plan:  1. Chaplains will follow-up at a later time for assessment of any spiritual care needs present.         2.   The Chaplains will be available to provide further emotional support per request.

## 2020-12-11 NOTE — PLAN OF CARE
Jalil Hunter was evaluated today and a DME order was entered for a standard wheelchair because he requires this to successfully complete daily living tasks of ambulating. A standard manual wheelchair is necessary due to patient's impaired ambulation and mobility restrictions and would be unable to resolve these daily living tasks using a cane or walker. The patient is capable of using a standard wheelchair safely in their home and can maneuver within their home with adequate access. There is a caregiver available to provide necessary assistance. The need for this equipment was discussed with the patient and he understands, is in agreement, and has not expressed an unwillingness to use the wheelchair.

## 2020-12-11 NOTE — PROGRESS NOTES
12/09/20  1240 12/09/20  2221 12/10/20  0408 12/11/20  0326   WBC 5.1  --  4.0* 4.0*   HGB 9.0* 8.5* 8.8* 8.9*   PLT 90*  --  102* 125*     BMP:    Recent Labs     12/09/20  1240 12/10/20  0408 12/11/20  0326   * 132* 136   K 3.9 4.0 3.7   CL 94* 96* 94*   CO2 28 26 29   BUN 28* 26* 25*   CREATININE 0.6 0.6 0.9   GLUCOSE 109* 106 121*     Hepatic:   Recent Labs     12/09/20  1240 12/10/20  0408 12/11/20  0326   * 142* 125*   * 141* 127*   BILITOT 8.2* 7.6* 8.9*   ALKPHOS 497* 520* 608*     INR:   Recent Labs     12/09/20  1240   INR 1.98*       Imaging:  Results for orders placed during the hospital encounter of 11/19/20   XR ABDOMEN (KUB) (SINGLE AP VIEW)    Narrative PROCEDURE: XR ABDOMEN (KUB) (SINGLE AP VIEW)    CLINICAL INFORMATION: Severe abdominal pain, hx of pancreatic CA . COMPARISON: No prior study. TECHNIQUE: 2 supine projections of the abdomen    FINDINGS: Bowel gas pattern is nonspecific. Gas is present to the distal colon. Gas is not definitively identified and rectum. Stomach is mildly gas distended. Loops of gas filled bowel are seen in the mid abdomen one loop of borderline dilated small   bowel. Properitoneal fat stripes are preserved. Left psoas fat stripe is obscured. No pathologic calcifications are seen. There is dextroscoliosis and prior L3-L5 laminectomy. Impression Nonspecific gas pattern. **This report has been created using voice recognition software. It may contain minor errors which are inherent in voice recognition technology. **    Final report electronically signed by Dr. Surya Crowley on 11/19/2020 4:51 PM     Results for orders placed during the hospital encounter of 12/09/20   CT ABDOMEN PELVIS W IV CONTRAST Additional Contrast? None    Narrative PROCEDURE: CT ABDOMEN PELVIS W IV CONTRAST    CLINICAL INFORMATION: jaundice, hx of pancreatic ca . Back pain. COMPARISON: CT abdomen pelvis dated 11/6/2009.     TECHNIQUE: Axial 5 mm CT images were software. It may contain minor errors which are inherent in voice recognition technology. **    Final report electronically signed by Dr. 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 There are multiple lesions throughout the liver consistent with metastatic  disease. There is a large mass in the pancreas. There is ascites present. There is increased signal intensity in the subcutaneous soft tissues  consistent with edema. Lotus Hernández MD 12/11/2020  3:15 PM          Narrative PROCEDURE: MRI ABDOMEN WO CONTRAST MRCP    CLINICAL INFORMATION: pancreatic mass, obstructive jaundice . COMPARISON: CT abdomen pelvis from the same date. TECHNIQUE: Coronal and axial T2 haste, axial T2 with fat saturation and axial 3-D and out of phase along with coronal T2 3-D thin and axial T1 vibe images of the abdomen. FINDINGS: There is again noted to be an ill-defined mass involving the body and tail of the pancreas measuring roughly 5.5 x 3.6 cm. The gallbladder is normal in appearance. The cystic duct is patent. The hepatic ducts appear patent. The common duct is   also patent without focal filling defect. No pancreatic ductal dilatation is identified. Redemonstration of numerous liver lesions which are hyperintense T2 signal. There are multiple omental nodules and prominent mesenteric lymph nodes, similar to prior   CT. There is moderate ascites, stable compared to prior CT. Impression 1.  There is a large mass at the junction of the right and left °C) (Oral)   Resp 16   Ht 5' 10.5\" (1.791 m)   Wt 185 lb 6.4 oz (84.1 kg)   SpO2 92%   BMI 26.23 kg/m²     Intake/Output Summary (Last 24 hours) at 2020 1520  Last data filed at 2020 1255  Gross per 24 hour   Intake 1204 ml   Output 5800 ml   Net -4596 ml     General appearance: alert and cooperative with exam, appears cachectic with jaundice   Lungs: clear to auscultation bilaterally  Heart: regular rate and rhythm, S1, S2 normal, no murmur, click, rub or gallop  Abdomen: Distended abdomen likely air no significant ascites  Extremities: extremities normal, atraumatic, no cyanosis or edema    Assessment and Plan:   1. Plan for ERCP with stent placement as management of Klatskin tumor on the schedule will see the family will agree for that.   ERCP is not going to change the prognosis is going to address the jaundice only I would be part of palliative care      Follow up in GI Clinic after discharge in 2 week(s)    Patient Active Problem List:     Pancreatic cancer metastasized to liver [facility])     Chemotherapy management, encounter for     Pancreatic cancer [facility])     Cancer associated pain     Generalized abdominal pain     Therapeutic opioid induced constipation     Severe malnutrition ([facility])     Hyponatremia     Other fluid overload     Jaundice, non-      Electronically signed by Vicki Lopez MD on 2020 at 3:20 PM

## 2020-12-11 NOTE — PROGRESS NOTES
Patient sitting on the edge of the bed. Patient is alert and oriented to all spheres. Patient states that his family will be bringing in advance directives to have them completed as these were initiated with the last admission. Discussed code status and patient given information sheet to review. Assisted patient to lie down in bed and patient extremely dyspneic with any movement. Patient asks this RN to hold his hand for a while with his shortness of breath and expresses concerns regarding how he is feeling. Patient asked what his thoughts are regarding further chemo and he states that it depends on his lab tests as to whether he can have further chemo. Much emotional support provided. Primary RN in the room and updated this RN that family will be arriving around 1:30 today and patient is in agreement to this RN meeting with him and his family. [id number]  Text message sent to Matheus Silver [facility] oncology regarding family meeting and if patient would be able to receive further chemo or if hospice would be appropriate. Per Davy Deras PAC, patient's liver studies are worsening and no further chemo will be offered/hospice would be appropriate. Gaurav Mendes indicates that she will not be rounding and will pass the message to Andrey Coronel CNP. Updated primary RN.

## 2020-12-11 NOTE — PROGRESS NOTES
PROGRESS NOTE      Patient:  Huber Ear      Unit/Bed:5K-15/015-A    YOB: 1955    MRN: [id number]       Acct: [de-identified]     PCP: Camilla Young MD    Date of Admission: 2020      Assessment/Plan:    Active Hospital Problems    Diagnosis Date Noted    Jaundice, non- [R17] 2020    Severe malnutrition ([address] [E43] 2020     Class: Chronic     Addendum: Met with family and discussed with Robyn Harvey RN hospice about goals of care. Patient and family wish for him to be DNR CC. Order changed in the EMR. Paperwork to be signed once completed. Paracentesis for comfort tomorrow. We will hold Lovenox prior to paracentesis. Conjugated hyperbilirubinemia, unclear etiology but suspect intrahepatic cholestasis vs. Obstructive jaundice  -Noted when he presented to oncology clinic 2020; Total bilirubin 8.2  -CT abdomen pelvis \"4.6 cm irregular hypodense mass at the junction of the body and tail of the pancreas, numerous hepatic metastases and possible splenic metastasis, mesenteric and omental mets with moderate ascites\". Concern for obstructive jaundice. However, MRCP demonstrating patent ducts. -LFTs consistent with cholestatic pattern: Alk phos 520, , , lipase normal. Total protein 4.4.   -check  Hepatitis panel, EBV, CMV   -consult to GI,case discussed with Dr. John San, ERCP can be done but unlikely to improve prognosis. Family to decide tonight the plan of care.   -Palliative and hospice consulted and following.       Chronic back pain, due to pancreatic cancer vs. pyelo  -afebrile but having chills, no urinary symptoms. check UA, (+) CVA tenderness  -history of uncontrolled pain, was seen by pain management.  Continue morphine regimen from home.   -Flexeril added q8 hrs for pain based on past pain management recommendations  -Urine culture gram negative bacilli, s/p zosyn, day 3  -Continue home pain medications  -Dilaudid q4 prn for severe pain      Anasarca, due to hypoalbuminemia up to level of umbilicus   -due to hypoalbuminemia   -Increased daily bumex to 1 mg BID, nephrology started bumex drip. Appreciate their assistance. -echo, EF 60-65%    Ascites  -noted on MRCP, will discuss paracentesis tomorrow for therapeutic purposes and comfort if patient desires     Transaminitis, likely due to liver metastases vs possible obstruction, worsening  -, , Alk phos 497 on admission  -plan as above #1     Normocytic Anemia, of chronic disease   -Hgb 9.0 on admission, baseline 11-12.5. Stable  -H&H q6 hours x2 occurrences stable at 8.8, CBC daily  -Iron studies 11/26: ferritin 991 elevated, iron 31 low, iron saturation 15% low, TIBC 203 wnl.      Thrombocytopenia, unclear etiology, stable  -90 on admission, baseline difficult to establish. Was 1 12/2.-Started lovenox recently, if platelets continue to decrease consider HIT workup   -Repeat CBC stable/improving. Hyponatremia, resolved.   -gentle IVF first night, then discontinued due to anasarca. Nephrology on board.  Appreciate their recommendations.      Pseudohypocalcemia secondary to hypoalbuminemia  -Ca 8.0, Albumin 2.2, corrected calcium 9.3     DVT proximal right femoral vein   -Noted on US 12/2  -Continue therapeutic lovenox     Metastatic Pancreatic Cancer   -diagnosed with pancreatic cancer on 10/27/2020 s/p liver biopsy   -Last dose of palliative chemotherapy (paclitaxel and gemcitabine) last week  -Consult to oncology, palliative care, and consider pain management consult if pain remains uncontrolled   -hospice consulted      Oral mucositis  -continue magic mouthwash as needed      Prostate cancer, [address] Adenocarcinoma  -noted       Malnutrition, due to malignancy and poor oral intake   -ONS       Chief Complaint: Jaundice, Flank Pain     Hospital Course:   76year old male with past medical history significant for prostate cancer, recent pancreatic cancer, oral mucositis, hyperlipidemia, arthritis and anxiety who presented from oncology office for evaluation of jaundice. He also has recent diagnosis of acute DVT and on therapeutic lovenox. Per office oncology note, \"Bilirubin 8.1 compared to 4.5 on 12/2/20. Alk phos 512, , . Unclear etiology if related intrahepatic ductal dilation versus obstruction from malignancy. Discussed worsening LFT with the patient and his friend today. Discussed with current  LFT, unable to give chemotherapy. Discussed recommendation for ED evaluation and hospital admission for further evaluation and treatment. Discussed if LFT does not improve he will not be a candidate for further chemotherapy. Called report to RITA Mora in the ED. Recommend MRCP to evaluate cause of hyperbilirubinemia and if stent able to be placed if obstruction present. \" He reports that he has had increasing fatigue, swelling of his bilateral legs and scrotum, and left flank plain that feels like, \"hit with a baseball bat,\" and is constant. Denies dysuria symptoms. Denies fevers. Reports chills.      On presentation, his vital signs were , /63, RR 16, 97% on room air. Labs significant for Na 132, BUN 28, Ca 8.0, ionized Ca 1.06, Total protein 4.8. Albumin 2.2, alkaline phosphatase 497, , , total bilirubin 8.2. Hgb 9.0, platelets 90. CT abdomen and pelvis demonstrated, 4.6 cm irregular hypodense mass at the junction of the body and tail of the pancreas, numerous hepatic mets and possible splenic mets, mesenteric and omental mets with moderate ascites, and anasarca. He was admitted to the hospitalists. Please see A&P for further details. Subjective:   Patient seen and examined in his room with family at the bedside and wife on the phone. He continues to be in pain, has dry/sore mouth. The family states that they are confused about the plan, whether to pursue ERCP or to go the hospice route. Palliative care, hospice, oncology, GI, and nephrology are following. Case discussed with oncology, GI, and hospice. Medications:  Reviewed    Infusion Medications    bumetanide 0.1 mg/mL infusion 0.5 mg/hr (12/11/20 1045)     Scheduled Medications    calcium replacement protocol   Other RX Placeholder    albumin human  25 g Intravenous Q8H    docusate sodium  100 mg Oral BID    enoxaparin  1.5 mg/kg Subcutaneous Daily    gabapentin  400 mg Oral TID    hydrocortisone  25 mg Rectal Q12H    lidocaine  1 patch Topical Daily    morphine  30 mg Oral TID    naloxegol  12.5 mg Oral QAM    pantoprazole  40 mg Oral QAM AC    sodium chloride  2 g Oral TID WC    tamsulosin  0.4 mg Oral Daily    sodium chloride flush  10 mL Intravenous 2 times per day    piperacillin-tazobactam  3.375 g Intravenous Q6H    polyethylene glycol  17 g Oral Daily     PRN Meds: cyclobenzaprine, HYDROmorphone, magic (miracle) mouthwash, morphine, sodium chloride flush, acetaminophen **OR** acetaminophen, polyethylene glycol, promethazine **OR** ondansetron, potassium chloride **OR** potassium alternative oral replacement **OR** potassium chloride      Intake/Output Summary (Last 24 hours) at 12/11/2020 1241  Last data filed at 12/11/2020 1214  Gross per 24 hour   Intake 2700.12 ml   Output 5525 ml   Net -2824.88 ml       Diet:  Dietary Nutrition Supplements: Standard High Calorie Oral Supplement  DIET GENERAL; Daily Fluid Restriction: 1500 ml  Dietary Nutrition Supplements: Other Oral Supplement (see comment)  Dietary Nutrition Supplements: Other Oral Supplement (see comment)    Exam:  BP (!) 115/56   Pulse 112   Temp 98.2 °F (36.8 °C) (Oral)   Resp 16   Ht 5' 10.5\" (1.791 m)   Wt 185 lb 6.4 oz (84.1 kg)   SpO2 93%   BMI 26.23 kg/m²     General appearance:  Jaundiced, fatigued, frail with temporal wasting, appears older than stated age and cooperative. HEENT:  Temporal wasting, atraumatic without obvious deformity. Pupils pinpoit  Extra ocular muscles intact.  Conjunctivae/corneas jaundiced. Neck: Supple, with full range of motion. No jugular venous distention. Trachea midline. Respiratory: Increased respiratory effort. Coarse breath sounds. No wheeze. Cardiovascular:  Tachycardic, normal rhythm with normal S1/S2 without murmurs, rubs or gallops. Abdomen: Distended, tender, tympanic to percussion. Edema to belly button. Musculoskeletal:  Pitting edema to greater trochanter bilaterally   Skin: Jaundice diffusely. No rashes or lesions. Neurologic:  Fine tremor. Cranial nerves: II-XII intact, grossly non-focal.  Psychiatric:  Alert and oriented, thought content appropriate, normal insight  Capillary Refill: Brisk,< 3 seconds   Peripheral Pulses: +2 palpable, equal bilaterally     Labs:   Recent Labs     12/09/20  1240 12/09/20  2221 12/10/20  0408 12/11/20  0326   WBC 5.1  --  4.0* 4.0*   HGB 9.0* 8.5* 8.8* 8.9*   HCT 26.2* 25.0* 25.7* 25.9*   PLT 90*  --  102* 125*     Recent Labs     12/09/20  1240 12/10/20  0408 12/11/20  0326   * 132* 136   K 3.9 4.0 3.7   CL 94* 96* 94*   CO2 28 26 29   BUN 28* 26* 25*   CREATININE 0.6 0.6 0.9   CALCIUM 8.0* 7.7* 8.1*     Recent Labs     12/09/20  0919 12/09/20  1240 12/10/20  0408 12/11/20  0326   * 121* 142* 125*   * 163* 141* 127*   BILIDIR 6.9*  --   --   --    BILITOT 8.1* 8.2* 7.6* 8.9*   ALKPHOS 512* 497* 520* 608*     Recent Labs     12/09/20  1240   INR 1.98*     No results for input(s): CKTOTAL, TROPONINI in the last 72 hours. Urinalysis:      Lab Results   Component Value Date    NITRU see below 12/09/2020    WBCUA 0-2 12/09/2020    BACTERIA NONE SEEN 12/09/2020    RBCUA 3-5 12/09/2020    BLOODU NEGATIVE 12/09/2020    SPECGRAV >1.030 12/09/2020       Radiology:  MRI ABDOMEN WO CONTRAST MRCP   Final Result       1. Redemonstration of prominent pancreatic mass in the body and tail with multiple metastatic lesions in the liver and omentum with moderate ascites. 2. No ductal dilatation or filling defect identified. **This report has been created using voice recognition software. It may contain minor errors which are inherent in voice recognition technology. **      Final report electronically signed by Dr. Alcides Ferguson MD on 12/9/2020 6:25 PM      CT ABDOMEN PELVIS W IV CONTRAST Additional Contrast? None   Final Result       1. 4.6 cm irregular hypodense mass at the junction of the body and tail the pancreas as evidence for recurrent malignancy. 2. Numerous hepatic metastases and possible splenic metastasis. 3. Mesenteric and omental metastases with moderate ascites. 4. Anasarca. **This report has been created using voice recognition software. It may contain minor errors which are inherent in voice recognition technology. **      Final report electronically signed by Dr. Alcides Ferguson MD on 12/9/2020 3:00 PM          Diet: Dietary Nutrition Supplements: Standard High Calorie Oral Supplement  DIET GENERAL; Daily Fluid Restriction: 1500 ml  Dietary Nutrition Supplements: Other Oral Supplement (see comment)  Dietary Nutrition Supplements: Other Oral Supplement (see comment)    DVT prophylaxis: [x] Lovenox                                 [] SCDs                                 [] SQ Heparin                                 [] Encourage ambulation           [] Already on Anticoagulation     Disposition:    [] Home       [] TCU       [] Rehab       [] Psych       [] SNF       [] [place]       [] Other-    Code Status: Full Code    PT/OT Eval Status: none      Electronically signed by Sonam Wood DO on 12/11/2020 at 12:41 PM

## 2020-12-11 NOTE — PROGRESS NOTES
Miguel Ángel Lincoln CNP is present on the unit and updated this RN that potential stent will be placed and pending results, chemo may still be an option if liver studies improve-she informed patient/family of this. Met with patient at his bedside. Patient's wife, Zena Mcgrath joined via speaker phone (she has been quarantining since the [place] outbreak). Patient's son and daughter-in-law are at the bedside. Discussed with patient/family goals of care and that it is uncertain yet pending the stent placement regarding if chemo is an option yet. Family asks, \"What if he no longer wants chemo? \"  Patient/family indicate that the patient has only declined since chemo was intitiated. Discussed option of comfort care/hospice. Discussed at length what this entails and that the patient would have to no longer wish to return to the hospital and/or doctor and would want comfort/quality of life with whatever time is remaining. Patient/family wish to have an informational meeting with hospice. Discussed code status as the patient is a full code. If patient is to have a stent placed, doctor would want him to be a full code. After the procedure, discussed that limitations on resuscitative measures would be reasonable. Patient does indicate that he would not wish for resuscitative measures. Much emotional support provided. Updated Dr. Tamara Pierson and hospice consult ordered. Hospice notified of the consult. Updated primary RN. Please call palliative care if further needs arise.

## 2020-12-11 NOTE — PROGRESS NOTES
Discussed with son in room about determining a time for palliative care meeting tomorrow. Son to talk to patient and family tonight to discuss time for meeting, will call and let nurse know when time is decided upon.

## 2020-12-11 NOTE — CONSULTS
[facility]    Patient Kelly Gan   MRN -  [id number]   Jennifer # - [de-identified]   - 1955      Date of Admission -  2020 11:54 AM  Date of evaluation -  2020  Room - [address] [place] [address] Day - MD Amie Primary Care Physician - Raquel Granda MD     Inpatient consult to Oncology  Consult performed by: LISA Paul - CNP  Consult ordered by: Christin Bosch DO           Reason for Consult    Known pt to our office    HPI   Briana Barrios is a 76 y.o. male admitted as a referral from our office to ED for abnormal labs: Total bilirubin 8.2. Albumin 2.2. Alk Phos 497. . . Pt jaundiced. CT Abd/Pelvis (+) 4.6 cm irregular hypodense mass at junction of the body and tail of pancreas, numerous hepatic metastases, possible splenic metastasis, mesenteric and omental mets with moderate ascites. Concern for obstructive jaundice. MRCP resulted with patent ducts, however, Dr. Merrilee Cabot review of the scans possible stricture. Dr. Iona Mckeon to review with radiologist.  GI discussing possible ERCP with stent placement. Recent diagnosis of acute DVT, on therapeutic lovenox. Pt reports increasing fatigue, peripheral edema of BLE and scrotum, L flank pain. Pt denied fever/chills, dysuria. Palliative care has meeting with pt & family today. 2020:  Pt resting in bed, family at bedside. Pt denies pain at rest.  Pt states his belly \"feels full and tight\". Pt reports occasional nausea, controlled with meds per pt. Pt reports SOB at rest and worse with activity. Pt reports BLE peripheral edema, tender to touch, redness and warmth noted, +3 pitting edema. Pt denies fever/chills, H/A, dizziness, cough, CP, vomiting, constipation, diarrhea, peripheral neuropathy, active bleeding.       Oncology History    Pancreatic cancer metastasized to liver  Hx prostate cancer  Meds    Current Medications    calcium replacement protocol Other RX Placeholder    albumin human  25 g Intravenous Q8H    docusate sodium  100 mg Oral BID    enoxaparin  1.5 mg/kg Subcutaneous Daily    gabapentin  400 mg Oral TID    hydrocortisone  25 mg Rectal Q12H    lidocaine  1 patch Topical Daily    morphine  30 mg Oral TID    naloxegol  12.5 mg Oral QAM    pantoprazole  40 mg Oral QAM AC    sodium chloride  2 g Oral TID WC    tamsulosin  0.4 mg Oral Daily    sodium chloride flush  10 mL Intravenous 2 times per day    piperacillin-tazobactam  3.375 g Intravenous Q6H    polyethylene glycol  17 g Oral Daily     cyclobenzaprine, HYDROmorphone, magic (miracle) mouthwash, morphine, sodium chloride flush, acetaminophen **OR** acetaminophen, polyethylene glycol, promethazine **OR** ondansetron, potassium chloride **OR** potassium alternative oral replacement **OR** potassium chloride  IV Drips/Infusions   bumetanide 0.1 mg/mL infusion 0.5 mg/hr (12/11/20 1045)     Past Medical History         Diagnosis Date    Anxiety     Arthritis     [facility]     Chronic skin ulcer ([facility]     Hyperlipidemia     Pancreatic cancer ([facility]     [facility]     Sleep apnea       Past Surgical History           Procedure Laterality Date    APPENDECTOMY      BACK SURGERY      KNEE CARTILAGE SURGERY Right 1977    KNEE SURGERY Left     1980    NASAL SEPTUM SURGERY Left      Diet    Dietary Nutrition Supplements: Standard High Calorie Oral Supplement  DIET GENERAL; Daily Fluid Restriction: 1500 ml  Dietary Nutrition Supplements: Other Oral Supplement (see comment)  Dietary Nutrition Supplements: Other Oral Supplement (see comment)  Allergies    Anaprox [naproxen] and Vicodin [hydrocodone-acetaminophen]  Social History     Social History     Socioeconomic History    Marital status:      Spouse name: Not on file    Number of children: Not on file    Years of education: Not on file    Highest education level: Not on file   Occupational History    Not on file   Social Needs    Financial resource strain: Not on file    Food insecurity     Worry: Not on file     Inability: Not on file    Transportation needs     Medical: Not on file     Non-medical: Not on file   Tobacco Use    Smoking status: Current Every Day Smoker     Packs/day: 0.00     Years: 50.00     Pack years: 0.00     Types: Cigarettes     Start date: 1970    Smokeless tobacco: Never Used    Tobacco comment: SMOKING 2-3 CIGRETTES PER WEEK   Substance and Sexual Activity    Alcohol use: Not Currently    Drug use: Not Currently    Sexual activity: Not Currently     Partners: Female   Lifestyle    Physical activity     Days per week: Not on file     Minutes per session: Not on file    Stress: Not on file   Relationships    Social connections     Talks on phone: Not on file     Gets together: Not on file     Attends Shinto service: Not on file     Active member of club or organization: Not on file     Attends meetings of clubs or organizations: Not on file     Relationship status: Not on file    Intimate partner violence     Fear of current or ex partner: Not on file     Emotionally abused: Not on file     Physically abused: Not on file     Forced sexual activity: Not on file   Other Topics Concern    Not on file   Social History Narrative    Not on file     Family History          Problem Relation Age of Onset    Cancer Mother     Depression Mother     Heart Disease Mother      ROS     Review of Systems   Pertinent review of systems noted in HPI, all other ROS negative. Vitals     height is 5' 10.5\" (1.791 m) and weight is 185 lb 6.4 oz (84.1 kg). His oral temperature is 97.9 °F (36.6 °C). His blood pressure is 114/61 and his pulse is 114. His respiration is 16 and oxygen saturation is 92%. Exam   Physical Exam   General appearance: No apparent distress, calm and cooperative. Ill-appearing. HEENT: Pupils equal, round, and reactive to light. Conjunctivae/corneas clear.  Oral mucosa moist.  Neck: Supple, with full range of motion. Trachea midline. Respiratory:  Normal respiratory effort. Clear to auscultation, bilaterally without Rales/Wheezes/Rhonchi. Cardiovascular: Regular rate and rhythm with normal S1/S2. Abdomen: Soft, tender, slightly distended with active bowel sounds. Musculoskeletal: No clubbing, cyanosis bilaterally. +3 pitting edema to BLE, tender to touch. Limited range of motion d/t physical deconditioning. Skin: Skin color jaundiced with redness noted on BLE, texture normal.  Scattered scabbed areas on body. Poor skin turgor. Anasarca. Neurologic:  Neurovascularly intact without any focal sensory/motor deficits. Psychiatric: Alert and oriented x 4, thought content appropriate, normal insight. Capillary Refill: < 2 seconds   Peripheral Pulses: +2 palpable, equal bilaterally        Labs   CBC  Recent Labs     12/09/20  0919 12/09/20  1240 12/09/20  2221 12/10/20  0408 12/11/20  0326   WBC 4.8 5.1  --  4.0* 4.0*   RBC 2.89* 2.81*  --  2.78* 2.76*   HGB 9.1* 9.0* 8.5* 8.8* 8.9*   HCT 27.1* 26.2* 25.0* 25.7* 25.9*   MCV 94 93.2  --  92.4 93.8   MCH 31.5 32.0  --  31.7 32.2   MCHC 33.6 34.4  --  34.2 34.4   RDW 16.6*  --   --   --   --    PLT 98* 90*  --  102* 125*   MPV 11.1 11.7  --  12.6* 12.0      BMP  Recent Labs     12/09/20  1240 12/10/20  0408 12/11/20  0326   * 132* 136   K 3.9 4.0 3.7   CL 94* 96* 94*   CO2 28 26 29   BUN 28* 26* 25*   CREATININE 0.6 0.6 0.9   GLUCOSE 109* 106 121*   CALCIUM 8.0* 7.7* 8.1*     LFT  Recent Labs     12/09/20  1240 12/10/20  0408 12/11/20  0326   * 142* 125*   * 141* 127*   BILITOT 8.2* 7.6* 8.9*   ALKPHOS 497* 520* 608*   LIPASE 10.1  --   --      INR  Recent Labs     12/09/20  1240   INR 1.98*     PTT  No results for input(s): APTT in the last 72 hours.     Radiology        Xr Abdomen (kub) (single Ap View)    Result Date: 11/19/2020  PROCEDURE: XR ABDOMEN (KUB) (SINGLE AP VIEW) CLINICAL INFORMATION: lesion in the spleen near the hilum which has appeared in the interval. There is an irregular hypodense mass at the junction of the body and tail of the pancreas measuring 4.6 x 3.8 cm in greatest axial dimensions. There are prominent mesenteric lymph nodes centrally. There are multiple nodules and hypodense masses along the omentum. There is fluid in the rectum and remainder of the colon. There are long segments of wall thickening in the small bowel. The bladder is unremarkable. The prostate appears to be surgically absent. There is prominent subcutaneous edema in the fat about the pelvis and visualized lower extremities. There are laminectomies at L3-4 through L5-S1. No suspicious osseous lesion is identified. 1. 4.6 cm irregular hypodense mass at the junction of the body and tail the pancreas as evidence for recurrent malignancy. 2. Numerous hepatic metastases and possible splenic metastasis. 3. Mesenteric and omental metastases with moderate ascites. 4. Anasarca. **This report has been created using voice recognition software. It may contain minor errors which are inherent in voice recognition technology. ** Final report electronically signed by Dr. Ricardo Busch MD on 12/9/2020 3:00 PM    Mri Abdomen Wo Contrast Mrcp    Result Date: 12/9/2020  PROCEDURE: MRI ABDOMEN WO CONTRAST MRCP CLINICAL INFORMATION: pancreatic mass, obstructive jaundice . COMPARISON: CT abdomen pelvis from the same date. TECHNIQUE: Coronal and axial T2 haste, axial T2 with fat saturation and axial 3-D and out of phase along with coronal T2 3-D thin and axial T1 vibe images of the abdomen. FINDINGS: There is again noted to be an ill-defined mass involving the body and tail of the pancreas measuring roughly 5.5 x 3.6 cm. The gallbladder is normal in appearance. The cystic duct is patent. The hepatic ducts appear patent. The common duct is also patent without focal filling defect. No pancreatic ductal dilatation is identified. Redemonstration of numerous liver lesions which are hyperintense T2 signal. There are multiple omental nodules and prominent mesenteric lymph nodes, similar to prior  CT. There is moderate ascites, stable compared to prior CT. 1. Redemonstration of prominent pancreatic mass in the body and tail with multiple metastatic lesions in the liver and omentum with moderate ascites. 2. No ductal dilatation or filling defect identified. **This report has been created using voice recognition software. It may contain minor errors which are inherent in voice recognition technology. ** Final report electronically signed by Dr. Jackson Price MD on 12/9/2020 6:25 PM    Vl Dup Lower Extremity Venous Bilateral    Result Date: 12/2/2020  PROCEDURE: VL DUP LOWER EXTREMITY VENOUS BILATERAL CLINICAL INFORMATION: Pancreatic cancer metastasized to [facility]), Pancreatic cancer metastasized to [facility]), Localized swelling of both lower legs. COMPARISON: No prior study. TECHNIQUE: Venous doppler ultrasound was performed of the bilateral lower extremities using gray scale, color flow and spectral doppler imaging. FINDINGS: Right leg: Deep veins (common femoral, popliteal, and calf veins): Patent and compressible, with spontaneous flow, phasicity, and satisfactory augmentation. There is partial compressibility and partial flow within the right proximal femoral vein concerning for acute partial DVT. Superficial veins (greater saphenous vein): Patent where visualized. Left leg: Deep veins (common femoral, femoral, popliteal, and calf veins): Patent and compressible, with spontaneous flow, phasicity, and satisfactory augmentation. Superficial veins (greater saphenous vein): Patent where visualized. Baker's cyst: None     Acute partial deep vein thrombus in the proximal right femoral vein.  Report directly provided to Dr. Tony Delvalle at 2:29 PM on 12/2/2020 Final report electronically signed by Dr. Nicole Serrato on 12/2/2020 2:30 PM      Assessment/Recommendations    1. Pancreatic Cancer metastasized to liver - No plans for chemotherapy during hospital course. LFTs elevated. Possible obstructive jaundice. Pancreatic mass. MRCP (-) filling defects/dilation but GI review of films noted possible stricture. GI to discuss with radiologist.  Pt & family meeting with palliative care today to discuss goals of care. 2.  Leukocytopenia - WBC 4.0. [address] ~ 4622. Likely related to recent chemotherapy. Trend. 3.  Normocytic anemia - H/H 8.9/25.9. MCV 93.8. Trend. Transfuse for Hgb < 7.    4.  Thrombocytopenia - Platelet count 968. Likely related to recent chemotherapy. Trend. Case discussed with nurse and patient/family. Questions and concerns addressed. Plan made in collaboration with Dr. Marii Grissom.     Electronically signed by   LISA Gregorio CNP on 12/11/2020 at 1:10 PM

## 2020-12-12 ENCOUNTER — APPOINTMENT (OUTPATIENT)
Dept: GENERAL RADIOLOGY | Age: 65
DRG: 435 | End: 2020-12-12
Payer: COMMERCIAL

## 2020-12-12 ENCOUNTER — APPOINTMENT (OUTPATIENT)
Dept: ULTRASOUND IMAGING | Age: 65
DRG: 435 | End: 2020-12-12
Payer: COMMERCIAL

## 2020-12-12 LAB
ALBUMIN SERPL-MCNC: 3.2 G/DL (ref 3.5–5.1)
ALP BLD-CCNC: 444 U/L (ref 38–126)
ALT SERPL-CCNC: 80 U/L (ref 11–66)
ANION GAP SERPL CALCULATED.3IONS-SCNC: 11 MEQ/L (ref 8–16)
AST SERPL-CCNC: 77 U/L (ref 5–40)
BILIRUB SERPL-MCNC: 8.2 MG/DL (ref 0.3–1.2)
BUN BLDV-MCNC: 20 MG/DL (ref 7–22)
CALCIUM IONIZED: 0.96 MMOL/L (ref 1.12–1.32)
CALCIUM SERPL-MCNC: 8.3 MG/DL (ref 8.5–10.5)
CHLORIDE BLD-SCNC: 91 MEQ/L (ref 98–111)
CMV IGM: 11.2 AU/ML
CO2: 34 MEQ/L (ref 23–33)
CREAT SERPL-MCNC: 0.9 MG/DL (ref 0.4–1.2)
EPSTEIN-BARR VIRUS ANTIBODIES: NORMAL
GFR SERPL CREATININE-BSD FRML MDRD: 84 ML/MIN/1.73M2
GLUCOSE BLD-MCNC: 121 MG/DL (ref 70–108)
INR BLD: 1.3 (ref 0.85–1.13)
MAGNESIUM: 1.4 MG/DL (ref 1.6–2.4)
POTASSIUM SERPL-SCNC: 3 MEQ/L (ref 3.5–5.2)
SODIUM BLD-SCNC: 136 MEQ/L (ref 135–145)
TOTAL PROTEIN: 5 G/DL (ref 6.1–8)

## 2020-12-12 PROCEDURE — 2500000003 HC RX 250 WO HCPCS: Performed by: INTERNAL MEDICINE

## 2020-12-12 PROCEDURE — 99232 SBSQ HOSP IP/OBS MODERATE 35: CPT | Performed by: NURSE PRACTITIONER

## 2020-12-12 PROCEDURE — 6370000000 HC RX 637 (ALT 250 FOR IP): Performed by: STUDENT IN AN ORGANIZED HEALTH CARE EDUCATION/TRAINING PROGRAM

## 2020-12-12 PROCEDURE — 80053 COMPREHEN METABOLIC PANEL: CPT

## 2020-12-12 PROCEDURE — 6370000000 HC RX 637 (ALT 250 FOR IP): Performed by: NURSE PRACTITIONER

## 2020-12-12 PROCEDURE — 0W9G3ZZ DRAINAGE OF PERITONEAL CAVITY, PERCUTANEOUS APPROACH: ICD-10-PCS | Performed by: STUDENT IN AN ORGANIZED HEALTH CARE EDUCATION/TRAINING PROGRAM

## 2020-12-12 PROCEDURE — 1200000003 HC TELEMETRY R&B

## 2020-12-12 PROCEDURE — 6360000002 HC RX W HCPCS: Performed by: FAMILY MEDICINE

## 2020-12-12 PROCEDURE — 49083 ABD PARACENTESIS W/IMAGING: CPT

## 2020-12-12 PROCEDURE — 2580000003 HC RX 258: Performed by: FAMILY MEDICINE

## 2020-12-12 PROCEDURE — 74018 RADEX ABDOMEN 1 VIEW: CPT

## 2020-12-12 PROCEDURE — 6360000002 HC RX W HCPCS: Performed by: INTERNAL MEDICINE

## 2020-12-12 PROCEDURE — 82330 ASSAY OF CALCIUM: CPT

## 2020-12-12 PROCEDURE — P9047 ALBUMIN (HUMAN), 25%, 50ML: HCPCS | Performed by: INTERNAL MEDICINE

## 2020-12-12 PROCEDURE — 99232 SBSQ HOSP IP/OBS MODERATE 35: CPT | Performed by: FAMILY MEDICINE

## 2020-12-12 PROCEDURE — 85610 PROTHROMBIN TIME: CPT

## 2020-12-12 PROCEDURE — 2580000003 HC RX 258: Performed by: INTERNAL MEDICINE

## 2020-12-12 PROCEDURE — 83735 ASSAY OF MAGNESIUM: CPT

## 2020-12-12 PROCEDURE — 36415 COLL VENOUS BLD VENIPUNCTURE: CPT

## 2020-12-12 RX ORDER — SODIUM CHLORIDE 1000 MG
2 TABLET, SOLUBLE MISCELLANEOUS 2 TIMES DAILY WITH MEALS
Status: DISCONTINUED | OUTPATIENT
Start: 2020-12-12 | End: 2020-12-14 | Stop reason: HOSPADM

## 2020-12-12 RX ORDER — POTASSIUM CHLORIDE 750 MG/1
40 TABLET, FILM COATED, EXTENDED RELEASE ORAL 3 TIMES DAILY
Status: COMPLETED | OUTPATIENT
Start: 2020-12-12 | End: 2020-12-12

## 2020-12-12 RX ADMIN — MORPHINE SULFATE 30 MG: 30 TABLET, FILM COATED, EXTENDED RELEASE ORAL at 21:35

## 2020-12-12 RX ADMIN — PIPERACILLIN AND TAZOBACTAM 3.38 G: 3; .375 INJECTION, POWDER, LYOPHILIZED, FOR SOLUTION INTRAVENOUS at 03:24

## 2020-12-12 RX ADMIN — DOCUSATE SODIUM 100 MG: 100 CAPSULE, LIQUID FILLED ORAL at 21:35

## 2020-12-12 RX ADMIN — NALOXEGOL OXALATE 12.5 MG: 12.5 TABLET, FILM COATED ORAL at 09:14

## 2020-12-12 RX ADMIN — POTASSIUM CHLORIDE 40 MEQ: 750 TABLET, EXTENDED RELEASE ORAL at 09:10

## 2020-12-12 RX ADMIN — PIPERACILLIN AND TAZOBACTAM 3.38 G: 3; .375 INJECTION, POWDER, LYOPHILIZED, FOR SOLUTION INTRAVENOUS at 15:25

## 2020-12-12 RX ADMIN — MORPHINE SULFATE 30 MG: 30 TABLET, FILM COATED, EXTENDED RELEASE ORAL at 09:10

## 2020-12-12 RX ADMIN — TAMSULOSIN HYDROCHLORIDE 0.4 MG: 0.4 CAPSULE ORAL at 20:02

## 2020-12-12 RX ADMIN — ALBUMIN (HUMAN) 25 G: 0.25 INJECTION, SOLUTION INTRAVENOUS at 01:13

## 2020-12-12 RX ADMIN — PIPERACILLIN AND TAZOBACTAM 3.38 G: 3; .375 INJECTION, POWDER, LYOPHILIZED, FOR SOLUTION INTRAVENOUS at 21:35

## 2020-12-12 RX ADMIN — SODIUM CHLORIDE TAB 1 GM 2 G: 1 TAB at 17:30

## 2020-12-12 RX ADMIN — MORPHINE SULFATE 15 MG: 15 TABLET ORAL at 11:48

## 2020-12-12 RX ADMIN — DOCUSATE SODIUM 100 MG: 100 CAPSULE, LIQUID FILLED ORAL at 09:15

## 2020-12-12 RX ADMIN — POTASSIUM CHLORIDE 40 MEQ: 750 TABLET, EXTENDED RELEASE ORAL at 15:27

## 2020-12-12 RX ADMIN — MORPHINE SULFATE 15 MG: 15 TABLET ORAL at 20:01

## 2020-12-12 RX ADMIN — GABAPENTIN 400 MG: 400 CAPSULE ORAL at 15:27

## 2020-12-12 RX ADMIN — GABAPENTIN 400 MG: 400 CAPSULE ORAL at 20:02

## 2020-12-12 RX ADMIN — FLUTICASONE PROPIONATE 1 SPRAY: 50 SPRAY, METERED NASAL at 09:16

## 2020-12-12 RX ADMIN — BUMETANIDE 0.5 MG/HR: 0.25 INJECTION INTRAMUSCULAR; INTRAVENOUS at 09:17

## 2020-12-12 RX ADMIN — PANTOPRAZOLE SODIUM 40 MG: 40 TABLET, DELAYED RELEASE ORAL at 09:15

## 2020-12-12 RX ADMIN — MORPHINE SULFATE 30 MG: 30 TABLET, FILM COATED, EXTENDED RELEASE ORAL at 15:26

## 2020-12-12 RX ADMIN — CYCLOBENZAPRINE 5 MG: 10 TABLET, FILM COATED ORAL at 20:01

## 2020-12-12 RX ADMIN — GABAPENTIN 400 MG: 400 CAPSULE ORAL at 09:12

## 2020-12-12 RX ADMIN — POTASSIUM CHLORIDE 40 MEQ: 750 TABLET, EXTENDED RELEASE ORAL at 21:35

## 2020-12-12 RX ADMIN — PIPERACILLIN AND TAZOBACTAM 3.38 G: 3; .375 INJECTION, POWDER, LYOPHILIZED, FOR SOLUTION INTRAVENOUS at 09:43

## 2020-12-12 ASSESSMENT — PAIN SCALES - GENERAL
PAINLEVEL_OUTOF10: 3
PAINLEVEL_OUTOF10: 6
PAINLEVEL_OUTOF10: 0
PAINLEVEL_OUTOF10: 6
PAINLEVEL_OUTOF10: 4
PAINLEVEL_OUTOF10: 6
PAINLEVEL_OUTOF10: 0
PAINLEVEL_OUTOF10: 7

## 2020-12-12 NOTE — PROGRESS NOTES
----- Message from Ezio Diaz MD sent at 8/8/2018  7:53 AM CDT -----  Glycemic control better-cpm, recheck per protocol   Nephrology Progress Note    Patient Bravo Fraction   MRN -  [id number]   Acct # - [de-identified]      - 1955    76 y.o. Admit Date: 2020  Hospital Day: 3  Location: [facility]15/015-A  Date of evaluation -  2020    Subjective:   CC: jaundice  Denies shortness of breath on Room air   UOP 6220/24h  BP Range: Systolic (22XAR), PIW:657 , Min:114 , YAF:240      Diastolic (89XFO), IZI:26, Min:56, Max:61    Objective:   VITALS:  BP (!) 121/59   Pulse 106   Temp 98.2 °F (36.8 °C) (Oral)   Resp 16   Ht 5' 10.5\" (1.791 m)   Wt 185 lb 6.4 oz (84.1 kg)   SpO2 93%   BMI 26.23 kg/m²    Patient Vitals for the past 24 hrs:   BP Temp Temp src Pulse Resp SpO2   20 2101 (!) 121/59 98.2 °F (36.8 °C) Oral 106 16 93 %   20 1347 -- -- -- -- -- 92 %   20 1257 114/61 97.9 °F (36.6 °C) Oral 114 16 92 %   20 0915 (!) 115/56 98.2 °F (36.8 °C) Oral 112 16 93 %          Intake/Output Summary (Last 24 hours) at 2020 0741  Last data filed at 2020 0650  Gross per 24 hour   Intake 1883 ml   Output 6225 ml   Net -4342 ml       Admission weight: 184 lb (83.5 kg)  Patient Vitals for the past 96 hrs (Last 3 readings):   Weight   20 1630 185 lb 6.4 oz (84.1 kg)   20 1206 184 lb (83.5 kg)     Body mass index is 26.23 kg/m². EXAM:  CONSTITUTIONAL:  No acute distress. Pleasant  HEENT:  Head is normocephalic, Extraocular movement intact. Neck is supple. Voice is clear. CARDIOVASCULAR:  S1, S2  regular rate and rhythm. RESPIRATORY: Clear to ausculation bilaterally. Equal breath sounds. No wheezes. No shortness of breath noted at rest.  ABDOMEN: rotund but soft, non tender  NEUROLOGICAL: Patient is alert and oriented to person, place, and time. Recent and remote memory intact. Thought is coherant. SKIN: Chronic LE erythema,  No significant bruises on exposed surfaces  MUSCULOSKELETAL: Movement is coordinated.  Moves all extremities   EXTREMITIES: Distal lower extremity temp is warm, 3+ lower extremity edema. PSYCHIATRIC: mood and affect appropriate. Medications:   Med reviewed  Scheduled Meds:   calcium replacement protocol   Other RX Placeholder    fluticasone  1 spray Each Nostril Daily    albumin human  25 g Intravenous Q8H    docusate sodium  100 mg Oral BID    enoxaparin  1.5 mg/kg Subcutaneous Daily    gabapentin  400 mg Oral TID    hydrocortisone  25 mg Rectal Q12H    lidocaine  1 patch Topical Daily    morphine  30 mg Oral TID    naloxegol  12.5 mg Oral QAM    pantoprazole  40 mg Oral QAM AC    sodium chloride  2 g Oral TID WC    tamsulosin  0.4 mg Oral Daily    sodium chloride flush  10 mL Intravenous 2 times per day    piperacillin-tazobactam  3.375 g Intravenous Q6H    polyethylene glycol  17 g Oral Daily     Continuous Infusions:   bumetanide 0.1 mg/mL infusion 0.5 mg/hr (12/11/20 2101)     PRN Meds cyclobenzaprine, HYDROmorphone, magic (miracle) mouthwash, morphine, sodium chloride flush, acetaminophen **OR** acetaminophen, polyethylene glycol, promethazine **OR** ondansetron, potassium chloride **OR** potassium alternative oral replacement **OR** potassium chloride   Labs:   Labs reviewed  Recent Labs     12/09/20  0919 12/09/20  1240 12/09/20  2221 12/10/20  0408 12/11/20  0326   WBC 4.8 5.1  --  4.0* 4.0*   RBC 2.89* 2.81*  --  2.78* 2.76*   HGB 9.1* 9.0* 8.5* 8.8* 8.9*   HCT 27.1* 26.2* 25.0* 25.7* 25.9*   MCV 94 93.2  --  92.4 93.8   MCH 31.5 32.0  --  31.7 32.2   RDW 16.6*  --   --   --   --    PLT 98* 90*  --  102* 125*       Recent Labs     12/10/20  0408 12/11/20 0326 12/12/20  0400   * 136 136   K 4.0 3.7 3.0*   CL 96* 94* 91*   CO2 26 29 34*   BUN 26* 25* 20   CREATININE 0.6 0.9 0.9   LABGLOM >90 84* 84*   GLUCOSE 106 121* 121*   CALCIUM 7.7* 8.1* 8.3*   CAION  --  0.96*  --      ASSESSMENT:  1. Anasarca, Bumex, albumin, Large UOP, BUN/Creatinine ok. Continue bumex drip. Check BMP, Ion Ca and Mag in AM  2.  Chronic hyponatremia, improved, Decrease salt tabs to 2x/d  3. Hypokalemia, Replace with total of 120 meq today, check ion Ca and Mag today  4. Pancreatic Cancer with mets to liver, Ascites  5. Normocytic, normochromic anemia, thrombocytopenia  6. DVT prox Rt femoral vein    Active Problems:    Severe malnutrition (HCC)    Jaundice, non-  Resolved Problems:    * No resolved hospital problems.  Fe Virgen, APRN - CNP 7:41 AM 2020

## 2020-12-12 NOTE — PROGRESS NOTES
piperacillin-tazobactam  3.375 g Intravenous Q6H    polyethylene glycol  17 g Oral Daily     Continuous Infusions:    bumetanide 0.1 mg/mL infusion 0.5 mg/hr (12/12/20 0917)       CBC:   Recent Labs     12/09/20  1240 12/09/20  2221 12/10/20  0408 12/11/20  0326   WBC 5.1  --  4.0* 4.0*   HGB 9.0* 8.5* 8.8* 8.9*   PLT 90*  --  102* 125*     BMP:    Recent Labs     12/10/20  0408 12/11/20  0326 12/12/20  0400   * 136 136   K 4.0 3.7 3.0*   CL 96* 94* 91*   CO2 26 29 34*   BUN 26* 25* 20   CREATININE 0.6 0.9 0.9   GLUCOSE 106 121* 121*     Hepatic:   Recent Labs     12/10/20  0408 12/11/20  0326 12/12/20  0400   * 125* 77*   * 127* 80*   BILITOT 7.6* 8.9* 8.2*   ALKPHOS 520* 608* 444*     INR:   Recent Labs     12/09/20  1240 12/12/20  0737   INR 1.98* 1.30*       Imaging:  Results for orders placed during the hospital encounter of 11/19/20   XR ABDOMEN (KUB) (SINGLE AP VIEW)    Narrative PROCEDURE: XR ABDOMEN (KUB) (SINGLE AP VIEW)    CLINICAL INFORMATION: Severe abdominal pain, hx of pancreatic CA . COMPARISON: No prior study. TECHNIQUE: 2 supine projections of the abdomen    FINDINGS: Bowel gas pattern is nonspecific. Gas is present to the distal colon. Gas is not definitively identified and rectum. Stomach is mildly gas distended. Loops of gas filled bowel are seen in the mid abdomen one loop of borderline dilated small   bowel. Properitoneal fat stripes are preserved. Left psoas fat stripe is obscured. No pathologic calcifications are seen. There is dextroscoliosis and prior L3-L5 laminectomy. Impression Nonspecific gas pattern. **This report has been created using voice recognition software. It may contain minor errors which are inherent in voice recognition technology. **    Final report electronically signed by Dr. Lavonne Vivar on 11/19/2020 4:51 PM     Results for orders placed during the hospital encounter of 12/09/20   CT ABDOMEN PELVIS W IV CONTRAST Additional Contrast? None    Narrative PROCEDURE: CT ABDOMEN PELVIS W IV CONTRAST    CLINICAL INFORMATION: jaundice, hx of pancreatic ca . Back pain. COMPARISON: CT abdomen pelvis dated 11/6/2009. TECHNIQUE: Axial 5 mm CT images were obtained through the abdomen and pelvis after the administration of 80 mL Isovue-370 injected in the left AC. Coronal and sagittal reconstructions were created. All CT scans at this facility use dose modulation, iterative reconstruction, and/or weight-based dosing when appropriate to reduce radiation dose to as low as reasonably achievable. FINDINGS:   There is mild atelectasis at the bilateral lung bases. Visualized portions of lungs are otherwise clear. The visualized portion of the heart is unremarkable. There are numerous hypodense lesions throughout the liver which have appeared in the interval since prior exam. There is moderate ascites which has also appeared in the interval. The gallbladder is unremarkable. Adrenal glands are unremarkable. Kidneys   are normal in appearance. There are calcified granulomas in the spleen. There is a 1.1 cm hypodense lesion in the spleen near the hilum which has appeared in the interval. There is an irregular hypodense mass at the junction of the body and tail of the   pancreas measuring 4.6 x 3.8 cm in greatest axial dimensions. There are prominent mesenteric lymph nodes centrally. There are multiple nodules and hypodense masses along the omentum. There is fluid in the rectum and remainder of the colon. There are long segments of wall thickening in the small bowel. The bladder is unremarkable. The prostate appears to be surgically absent. There is prominent subcutaneous edema in the fat about the   pelvis and visualized lower extremities. There are laminectomies at L3-4 through L5-S1. No suspicious osseous lesion is identified.       Impression    1. 4.6 cm irregular hypodense mass at the junction of the body and tail the pancreas as evidence for recurrent malignancy. 2. Numerous hepatic metastases and possible splenic metastasis. 3. Mesenteric and omental metastases with moderate ascites. 4. Anasarca. **This report has been created using voice recognition software. It may contain minor errors which are inherent in voice recognition technology. **    Final report electronically signed by Dr. Yakov Fontaine MD on 12/9/2020 3:00 PM     Results for orders placed during the hospital encounter of 12/09/20   MRI ABDOMEN WO CONTRAST MRCP    Addendum ** ADDENDUM #1 **  The study was reviewed again at the request of the referring physician to  evaluate for a Klatskin tumor. 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 There are multiple lesions throughout the liver consistent with metastatic  disease. There is a large mass in the pancreas. There is ascites present. There is increased signal intensity in the subcutaneous soft tissues  consistent with edema. Annika Hernandez MD 12/11/2020  3:15 PM          Narrative PROCEDURE: MRI ABDOMEN WO CONTRAST MRCP    CLINICAL INFORMATION: pancreatic mass, obstructive jaundice . COMPARISON: CT abdomen pelvis from the same date. TECHNIQUE: Coronal and axial T2 haste, axial T2 with fat saturation and axial 3-D and out of phase along with coronal T2 3-D thin and axial T1 vibe images of the abdomen. FINDINGS: There is again noted to be an ill-defined mass involving the body and tail of the pancreas measuring roughly 5.5 x 3.6 cm. The gallbladder is normal in appearance. The cystic duct is patent. The hepatic ducts appear patent. The common duct is   also patent without focal filling defect. No pancreatic ductal dilatation is identified.  Redemonstration of numerous liver lesions which are hyperintense T2 signal. There are multiple omental nodules and prominent mesenteric lymph nodes, similar to prior   CT. There is moderate ascites, stable compared to prior CT. Impression 1. There is a large mass at the junction of the right and left hepatic ducts measuring 3.1 x 2.8 cm in size causing biliary obstruction. 2. There are multiple lesions throughout the liver consistent with metastatic disease. 3. This mass in the pancreas. 4. There is ascites present. 5. There is increased signal intensity in the subcutaneous soft tissues consistent with edema. Final report electronically signed by DR Jj Patel on 12/11/2020 3:13 PM    ** ORIGINAL REPORT **  PROCEDURE: MRI ABDOMEN WO CONTRAST MRCP    CLINICAL INFORMATION: pancreatic mass, obstructive jaundice . COMPARISON: CT abdomen pelvis from the same date. TECHNIQUE: Coronal and axial T2 haste, axial T2 with fat saturation and axial 3-D and out of phase along with coronal T2 3-D thin and axial T1 vibe images of the abdomen. FINDINGS: There is again noted to be an ill-defined mass involving the body and tail of the pancreas measuring roughly 5.5 x 3.6 cm. The gallbladder is normal in appearance. The cystic duct is patent. The hepatic ducts appear patent. The common duct is   also patent without focal filling defect. No pancreatic ductal dilatation is identified. Redemonstration of numerous liver lesions which are hyperintense T2 signal. There are multiple omental nodules and prominent mesenteric lymph nodes, similar to prior   CT. There is moderate ascites, stable compared to prior CT. IMPRESSION:     1. Redemonstration of prominent pancreatic mass in the body and tail with multiple metastatic lesions in the liver and omentum with moderate ascites. 2. No ductal dilatation or filling defect identified. **This report has been created using voice recognition software.  It may contain minor errors which are inherent in voice recognition technology. **    Final report electronically signed by Dr. Samantha Og MD on 2020 6:25 PM     No results found for this or any previous visit. Endoscopy Finding:      Objective:   Vitals: BP (!) 120/56   Pulse 106   Temp 98.4 °F (36.9 °C) (Axillary)   Resp 16   Ht 5' 10.5\" (1.791 m)   Wt 185 lb 6.4 oz (84.1 kg)   SpO2 93%   BMI 26.23 kg/m²     Intake/Output Summary (Last 24 hours) at 2020 1149  Last data filed at 2020 4497  Gross per 24 hour   Intake 1643 ml   Output 4700 ml   Net -3057 ml     General appearance: alert and cooperative with exam, appears cachectic with jaundice   Lungs: clear to auscultation bilaterally  Heart: regular rate and rhythm, S1, S2 normal, no murmur, click, rub or gallop  Abdomen: Distended abdomen likely air no significant ascites  Extremities: extremities normal, atraumatic, no cyanosis or edema    Assessment and Plan:   1. Plan for ERCP with stent placement as management of Klatskin tumor on the schedule will see the family will agree for that. ERCP is not going to change the prognosis is going to address the jaundice only I would be part of palliative care. 2. Patient and family opted not to do an ERCP at this time.   Abdomen distended we will do KUB to rule out ileus will manage accordingly      Follow up in GI Clinic after discharge in 2 week(s)    Patient Active Problem List:     Pancreatic cancer metastasized to liver [facility])     Chemotherapy management, encounter for     Pancreatic cancer [facility])     Cancer associated pain     Generalized abdominal pain     Therapeutic opioid induced constipation     Severe malnutrition ([address]     Hyponatremia     Other fluid overload     Jaundice, non-      Electronically signed by Zoila Brantley MD on 2020 at 11:49 AM                  Electronically signed by Zoila Brantley MD on 2020 at 10:34 AM

## 2020-12-12 NOTE — PROGRESS NOTES
PROGRESS NOTE      Patient:  Christy Spurling      Unit/Bed:5K-15/015-A    YOB: 1955    MRN: [id number]       Acct: [de-identified]     PCP: Kali Odonnell MD    Date of Admission: 2020      Assessment/Plan:    Active Hospital Problems    Diagnosis Date Noted    Jaundice, non- [R17] 2020    Severe malnutrition ([address] [E43] 2020     Class: Chronic     CODE STATUS DNR CC. Plans for discharge home with hospice, likely Monday to ensure pain control adequate. Conjugated hyperbilirubinemia, suspect intrahepatic cholestasis vs. Obstructive jaundice  -Noted when he presented to oncology clinic 2020; Total bilirubin 8.2  -CT abdomen pelvis \"4.6 cm irregular hypodense mass at the junction of the body and tail of the pancreas, numerous hepatic metastases and possible splenic metastasis, mesenteric and omental mets with moderate ascites\". Concern for obstructive jaundice. However, MRCP demonstrating patent ducts. -LFTs consistent with cholestatic pattern: Alk phos 520, , , lipase normal. Total protein 4.4.   -check  Hepatitis panel, EBV, CMV   -consult to GI,case discussed with Dr. Dc Graves, ERCP can be done but unlikely to improve prognosis. Patient and family decided to not pursue ERCP. CODE STATUS changed to DNR CC.  -Palliative and hospice consulted and following.       Chronic back pain, due to pancreatic cancer   -afebrile but having chills, no urinary symptoms. check UA, (+) CVA tenderness  -history of uncontrolled pain, was seen by pain management. Continue morphine regimen from home.   -Flexeril added q8 hrs for pain based on past pain management recommendations  -Urine culture gram negative bacilli, s/p zosyn, day 4.   Discontinue Zosyn.  -Continue home pain medications  -Dilaudid q4 prn for severe pain   -Hospice consulted and following.     Anasarca, due to hypoalbuminemia up to level of umbilicus   -due to hypoalbuminemia   -Increased daily bumex to 1 mg significant for prostate cancer, recent pancreatic cancer, oral mucositis, hyperlipidemia, arthritis and anxiety who presented from oncology office for evaluation of jaundice. He also has recent diagnosis of acute DVT and on therapeutic lovenox. Per office oncology note, \"Bilirubin 8.1 compared to 4.5 on 12/2/20. Alk phos 512, , . Unclear etiology if related intrahepatic ductal dilation versus obstruction from malignancy. Discussed worsening LFT with the patient and his friend today. Discussed with current  LFT, unable to give chemotherapy. Discussed recommendation for ED evaluation and hospital admission for further evaluation and treatment. Discussed if LFT does not improve he will not be a candidate for further chemotherapy. Called report to RITA Mora in the ED. Recommend MRCP to evaluate cause of hyperbilirubinemia and if stent able to be placed if obstruction present. \" He reports that he has had increasing fatigue, swelling of his bilateral legs and scrotum, and left flank plain that feels like, \"hit with a baseball bat,\" and is constant. Denies dysuria symptoms. Denies fevers. Reports chills.      On presentation, his vital signs were , /63, RR 16, 97% on room air. Labs significant for Na 132, BUN 28, Ca 8.0, ionized Ca 1.06, Total protein 4.8. Albumin 2.2, alkaline phosphatase 497, , , total bilirubin 8.2. Hgb 9.0, platelets 90. CT abdomen and pelvis demonstrated, 4.6 cm irregular hypodense mass at the junction of the body and tail of the pancreas, numerous hepatic mets and possible splenic mets, mesenteric and omental mets with moderate ascites, and anasarca. He was admitted to the hospitalists. Please see A&P for further details. Subjective:   Patient seen and examined in his room, no family present at this time. He reports that he is still having pain in his back 7-8 out of 10, although he appears much more comfortable than yesterday.   He is still having pitting edema of his legs. He states that he wants quality of life and to be home with family. I helped changed his gown as it was wet from using the restroom and got him warm blankets and propped up his legs with pillows.        Medications:  Reviewed    Infusion Medications    bumetanide 0.1 mg/mL infusion 0.5 mg/hr (12/11/20 2101)     Scheduled Medications    calcium replacement protocol   Other RX Placeholder    fluticasone  1 spray Each Nostril Daily    docusate sodium  100 mg Oral BID    enoxaparin  1.5 mg/kg Subcutaneous Daily    gabapentin  400 mg Oral TID    hydrocortisone  25 mg Rectal Q12H    lidocaine  1 patch Topical Daily    morphine  30 mg Oral TID    naloxegol  12.5 mg Oral QAM    pantoprazole  40 mg Oral QAM AC    sodium chloride  2 g Oral TID WC    tamsulosin  0.4 mg Oral Daily    sodium chloride flush  10 mL Intravenous 2 times per day    piperacillin-tazobactam  3.375 g Intravenous Q6H    polyethylene glycol  17 g Oral Daily     PRN Meds: cyclobenzaprine, HYDROmorphone, magic (miracle) mouthwash, morphine, sodium chloride flush, acetaminophen **OR** acetaminophen, polyethylene glycol, promethazine **OR** ondansetron, potassium chloride **OR** potassium alternative oral replacement **OR** potassium chloride      Intake/Output Summary (Last 24 hours) at 12/12/2020 5499  Last data filed at 12/12/2020 8109  Gross per 24 hour   Intake 1883 ml   Output 6225 ml   Net -4342 ml       Diet:  Dietary Nutrition Supplements: Standard High Calorie Oral Supplement  DIET GENERAL; Daily Fluid Restriction: 1500 ml  Dietary Nutrition Supplements: Other Oral Supplement (see comment)  Dietary Nutrition Supplements: Other Oral Supplement (see comment)    Exam:  BP (!) 121/59   Pulse 106   Temp 98.2 °F (36.8 °C) (Oral)   Resp 16   Ht 5' 10.5\" (1.791 m)   Wt 185 lb 6.4 oz (84.1 kg)   SpO2 93%   BMI 26.23 kg/m²     General appearance:  Jaundiced, fatigued, frail with temporal wasting, appears older than stated age and cooperative. Laying with eyes closed, but opens them spontaneously. HEENT:  Temporal wasting, atraumatic without obvious deformity. Pupils pinpoit  Extra ocular muscles intact. Conjunctivae/corneas jaundiced. Neck: Supple, with full range of motion. No jugular venous distention. Trachea midline. Respiratory: Increased respiratory effort. Rales left lower lobe. Cardiovascular:  Tachycardic, normal rhythm with normal S1/S2 without murmurs, rubs or gallops. Abdomen: Distended,nontender. Musculoskeletal:  Pitting edema bilaterally to thighs. Skin: Jaundice diffusely. No rashes or lesions. Neurologic:  Fine tremor. Cranial nerves: II-XII intact, grossly non-focal.  Psychiatric:  Alert and oriented, thought content appropriate, normal insight  Capillary Refill: Brisk,< 3 seconds   Peripheral Pulses: +2 palpable, equal bilaterally     Labs:   Recent Labs     12/09/20  1240 12/09/20  2221 12/10/20  0408 12/11/20  0326   WBC 5.1  --  4.0* 4.0*   HGB 9.0* 8.5* 8.8* 8.9*   HCT 26.2* 25.0* 25.7* 25.9*   PLT 90*  --  102* 125*     Recent Labs     12/10/20  0408 12/11/20  0326 12/12/20  0400   * 136 136   K 4.0 3.7 3.0*   CL 96* 94* 91*   CO2 26 29 34*   BUN 26* 25* 20   CREATININE 0.6 0.9 0.9   CALCIUM 7.7* 8.1* 8.3*     Recent Labs     12/09/20  0919 12/09/20  0919 12/10/20  0408 12/11/20 0326 12/12/20  0400   *   < > 142* 125* 77*   *   < > 141* 127* 80*   BILIDIR 6.9*  --   --   --   --    BILITOT 8.1*   < > 7.6* 8.9* 8.2*   ALKPHOS 512*   < > 520* 608* 444*    < > = values in this interval not displayed. Recent Labs     12/09/20  1240   INR 1.98*     No results for input(s): Les Raul in the last 72 hours.     Urinalysis:      Lab Results   Component Value Date    NITRU see below 12/09/2020    WBCUA 0-2 12/09/2020    BACTERIA NONE SEEN 12/09/2020    RBCUA 3-5 12/09/2020    BLOODU NEGATIVE 12/09/2020    SPECGRAV >1.030 12/09/2020       Radiology:  MRI ABDOMEN WO CONTRAST MRCP   Final Result   Addendum 1 of 1   ** ADDENDUM #1 **      The study was reviewed again at the request of the referring physician to    evaluate for a Klatskin tumor. 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 There are multiple lesions throughout the liver consistent with metastatic    disease. There is a large mass in the pancreas. There is ascites present. There is increased signal intensity in the subcutaneous soft tissues    consistent with edema. Final      CT ABDOMEN PELVIS W IV CONTRAST Additional Contrast? None   Final Result       1. 4.6 cm irregular hypodense mass at the junction of the body and tail the pancreas as evidence for recurrent malignancy. 2. Numerous hepatic metastases and possible splenic metastasis. 3. Mesenteric and omental metastases with moderate ascites. 4. Anasarca. **This report has been created using voice recognition software. It may contain minor errors which are inherent in voice recognition technology. **      Final report electronically signed by Dr. Kat Romero MD on 12/9/2020 3:00 PM      [address]    (Results Pending)       Diet: Dietary Nutrition Supplements: Standard High Calorie Oral Supplement  DIET GENERAL; Daily Fluid Restriction: 1500 ml  Dietary Nutrition Supplements: Other Oral Supplement (see comment)  Dietary Nutrition Supplements: Other Oral Supplement (see comment)    DVT prophylaxis: [x] Lovenox                                 [] SCDs                                 [] SQ Heparin                                 [] Encourage ambulation           [] Already on Anticoagulation     Disposition:    [x] Home- with hospice       [] TCU       [] Rehab       [] Psych       [] SNF       [] [address] [place] [address] Facility       [] Other-    Code Status: DNR-CC    PT/OT Eval Status: none      Electronically signed by Yuki Feliciano DO on 12/12/2020 at 8:03 AM

## 2020-12-13 LAB
ANION GAP SERPL CALCULATED.3IONS-SCNC: 8 MEQ/L (ref 8–16)
BUN BLDV-MCNC: 23 MG/DL (ref 7–22)
CALCIUM IONIZED: 0.97 MMOL/L (ref 1.12–1.32)
CALCIUM SERPL-MCNC: 7.9 MG/DL (ref 8.5–10.5)
CHLORIDE BLD-SCNC: 91 MEQ/L (ref 98–111)
CO2: 33 MEQ/L (ref 23–33)
CREAT SERPL-MCNC: 1.1 MG/DL (ref 0.4–1.2)
GFR SERPL CREATININE-BSD FRML MDRD: 67 ML/MIN/1.73M2
GLUCOSE BLD-MCNC: 130 MG/DL (ref 70–108)
MAGNESIUM: 1.6 MG/DL (ref 1.6–2.4)
POTASSIUM SERPL-SCNC: 3.6 MEQ/L (ref 3.5–5.2)
SODIUM BLD-SCNC: 132 MEQ/L (ref 135–145)

## 2020-12-13 PROCEDURE — 6360000002 HC RX W HCPCS: Performed by: INTERNAL MEDICINE

## 2020-12-13 PROCEDURE — 6370000000 HC RX 637 (ALT 250 FOR IP): Performed by: NURSE PRACTITIONER

## 2020-12-13 PROCEDURE — 82330 ASSAY OF CALCIUM: CPT

## 2020-12-13 PROCEDURE — 6360000002 HC RX W HCPCS: Performed by: FAMILY MEDICINE

## 2020-12-13 PROCEDURE — 99232 SBSQ HOSP IP/OBS MODERATE 35: CPT | Performed by: INTERNAL MEDICINE

## 2020-12-13 PROCEDURE — 83735 ASSAY OF MAGNESIUM: CPT

## 2020-12-13 PROCEDURE — 2500000003 HC RX 250 WO HCPCS: Performed by: INTERNAL MEDICINE

## 2020-12-13 PROCEDURE — 2580000003 HC RX 258: Performed by: FAMILY MEDICINE

## 2020-12-13 PROCEDURE — 6370000000 HC RX 637 (ALT 250 FOR IP): Performed by: STUDENT IN AN ORGANIZED HEALTH CARE EDUCATION/TRAINING PROGRAM

## 2020-12-13 PROCEDURE — 99232 SBSQ HOSP IP/OBS MODERATE 35: CPT | Performed by: FAMILY MEDICINE

## 2020-12-13 PROCEDURE — 6360000002 HC RX W HCPCS: Performed by: STUDENT IN AN ORGANIZED HEALTH CARE EDUCATION/TRAINING PROGRAM

## 2020-12-13 PROCEDURE — 36415 COLL VENOUS BLD VENIPUNCTURE: CPT

## 2020-12-13 PROCEDURE — 1200000003 HC TELEMETRY R&B

## 2020-12-13 PROCEDURE — 2580000003 HC RX 258: Performed by: INTERNAL MEDICINE

## 2020-12-13 PROCEDURE — 80048 BASIC METABOLIC PNL TOTAL CA: CPT

## 2020-12-13 PROCEDURE — 2580000003 HC RX 258: Performed by: STUDENT IN AN ORGANIZED HEALTH CARE EDUCATION/TRAINING PROGRAM

## 2020-12-13 RX ADMIN — BUMETANIDE 0.5 MG/HR: 0.25 INJECTION INTRAMUSCULAR; INTRAVENOUS at 15:47

## 2020-12-13 RX ADMIN — DOCUSATE SODIUM 100 MG: 100 CAPSULE, LIQUID FILLED ORAL at 20:18

## 2020-12-13 RX ADMIN — TAMSULOSIN HYDROCHLORIDE 0.4 MG: 0.4 CAPSULE ORAL at 20:18

## 2020-12-13 RX ADMIN — PIPERACILLIN AND TAZOBACTAM 3.38 G: 3; .375 INJECTION, POWDER, LYOPHILIZED, FOR SOLUTION INTRAVENOUS at 10:52

## 2020-12-13 RX ADMIN — PIPERACILLIN AND TAZOBACTAM 3.38 G: 3; .375 INJECTION, POWDER, LYOPHILIZED, FOR SOLUTION INTRAVENOUS at 04:17

## 2020-12-13 RX ADMIN — SODIUM CHLORIDE TAB 1 GM 2 G: 1 TAB at 20:18

## 2020-12-13 RX ADMIN — SODIUM CHLORIDE TAB 1 GM 2 G: 1 TAB at 10:46

## 2020-12-13 RX ADMIN — MORPHINE SULFATE 30 MG: 30 TABLET, FILM COATED, EXTENDED RELEASE ORAL at 15:47

## 2020-12-13 RX ADMIN — GABAPENTIN 400 MG: 400 CAPSULE ORAL at 10:46

## 2020-12-13 RX ADMIN — CALCIUM GLUCONATE 2 G: 98 INJECTION, SOLUTION INTRAVENOUS at 10:52

## 2020-12-13 RX ADMIN — PIPERACILLIN AND TAZOBACTAM 3.38 G: 3; .375 INJECTION, POWDER, LYOPHILIZED, FOR SOLUTION INTRAVENOUS at 21:38

## 2020-12-13 RX ADMIN — PIPERACILLIN AND TAZOBACTAM 3.38 G: 3; .375 INJECTION, POWDER, LYOPHILIZED, FOR SOLUTION INTRAVENOUS at 15:47

## 2020-12-13 RX ADMIN — FLUTICASONE PROPIONATE 1 SPRAY: 50 SPRAY, METERED NASAL at 10:48

## 2020-12-13 RX ADMIN — SODIUM CHLORIDE, PRESERVATIVE FREE 10 ML: 5 INJECTION INTRAVENOUS at 10:48

## 2020-12-13 RX ADMIN — MORPHINE SULFATE 30 MG: 30 TABLET, FILM COATED, EXTENDED RELEASE ORAL at 20:18

## 2020-12-13 RX ADMIN — GABAPENTIN 400 MG: 400 CAPSULE ORAL at 20:18

## 2020-12-13 RX ADMIN — DOCUSATE SODIUM 100 MG: 100 CAPSULE, LIQUID FILLED ORAL at 10:45

## 2020-12-13 RX ADMIN — PANTOPRAZOLE SODIUM 40 MG: 40 TABLET, DELAYED RELEASE ORAL at 06:05

## 2020-12-13 RX ADMIN — ENOXAPARIN SODIUM 120 MG: 120 INJECTION SUBCUTANEOUS at 10:47

## 2020-12-13 RX ADMIN — Medication 5 ML: at 22:32

## 2020-12-13 RX ADMIN — MORPHINE SULFATE 30 MG: 30 TABLET, FILM COATED, EXTENDED RELEASE ORAL at 10:45

## 2020-12-13 RX ADMIN — Medication 5 ML: at 10:48

## 2020-12-13 RX ADMIN — NALOXEGOL OXALATE 12.5 MG: 12.5 TABLET, FILM COATED ORAL at 10:46

## 2020-12-13 RX ADMIN — GABAPENTIN 400 MG: 400 CAPSULE ORAL at 15:48

## 2020-12-13 ASSESSMENT — PAIN SCALES - GENERAL
PAINLEVEL_OUTOF10: 0
PAINLEVEL_OUTOF10: 3
PAINLEVEL_OUTOF10: 4
PAINLEVEL_OUTOF10: 4
PAINLEVEL_OUTOF10: 0

## 2020-12-13 NOTE — PROGRESS NOTES
Patient is hallucinating at times, grabbing at things that are not there. Patient alert to voice.    Electronically signed by Silvano Higginbotham RN on 12/13/2020 at 7:47 AM

## 2020-12-13 NOTE — PROGRESS NOTES
Renal Progress Note    Assessment and Plan:    1. Volume overload  2. Hyponatremia  3. Hypocalcemia  4. Metastatic pancreatic cancer  5. Elevated liver enzymes  6. Hypoalbuminemia  7. Hypomagnesemia improved  8. Hypotension  9. Pancytopenia  10. Deconditioning  PLAN:   I discussed my thoughts with the patient this morning. He actually understood. He had no questions. Labs reviewed. 2000 mg calcium gluconate IV piggyback once for hypocalcemia. Medications reviewed. No changes otherwise. Labs in the morning. We will follow. Patient Active Problem List:     Pancreatic cancer metastasized to liver [facility])     Chemotherapy management, encounter for     Pancreatic cancer [facility])     Cancer associated pain     Generalized abdominal pain     Therapeutic opioid induced constipation     Severe malnutrition ([facility])     Hyponatremia     Other fluid overload     Jaundice, non-      Subjective:   Admit Date: 2020    Interval History:   Seen for volume overload and electrolyte imbalances. Awake and alert this morning. Had a good night sleep. No complaint. Updated by the staff. No issues. Blood pressures on the low end of normal.  Urine output is not completely documented.       Medications:   Scheduled Meds:   calcium gluconate IVPB  2 g Intravenous Once    sodium chloride  2 g Oral BID WC    calcium replacement protocol   Other RX Placeholder    fluticasone  1 spray Each Nostril Daily    docusate sodium  100 mg Oral BID    enoxaparin  1.5 mg/kg Subcutaneous Daily    gabapentin  400 mg Oral TID    hydrocortisone  25 mg Rectal Q12H    lidocaine  1 patch Topical Daily    morphine  30 mg Oral TID    naloxegol  12.5 mg Oral QAM    pantoprazole  40 mg Oral QAM AC    tamsulosin  0.4 mg Oral Daily    sodium chloride flush  10 mL Intravenous 2 times per day    piperacillin-tazobactam  3.375 g Intravenous Q6H    polyethylene glycol  17 g Oral Daily     Continuous Infusions:   bumetanide 0.1 mg/mL infusion 0.5 mg/hr (12/12/20 0917)       CBC:   Recent Labs     12/11/20  0326   WBC 4.0*   HGB 8.9*   *     CMP:    Recent Labs     12/11/20  0326 12/12/20  0400 12/13/20  0501    136 132*   K 3.7 3.0* 3.6   CL 94* 91* 91*   CO2 29 34* 33   BUN 25* 20 23*   CREATININE 0.9 0.9 1.1   GLUCOSE 121* 121* 130*   CALCIUM 8.1* 8.3* 7.9*   LABGLOM 84* 84* 67*     Troponin: No results for input(s): TROPONINI in the last 72 hours. BNP: No results for input(s): BNP in the last 72 hours. INR:   Recent Labs     12/12/20  0737   INR 1.30*     Lipids: No results for input(s): CHOL, LDLDIRECT, TRIG, HDL, AMYLASE, LIPASE in the last 72 hours. Liver:   Recent Labs     12/12/20  0400   AST 77*   ALT 80*   ALKPHOS 444*   PROT 5.0*   LABALBU 3.2*   BILITOT 8.2*     Iron:  No results for input(s): IRONS, FERRITIN in the last 72 hours. Invalid input(s): LABIRONS        Objective:   Vitals: BP (!) 102/54   Pulse 110   Temp 100.3 °F (37.9 °C) (Axillary)   Resp 9   Ht 5' 10.5\" (1.791 m)   Wt 185 lb 6.4 oz (84.1 kg)   SpO2 92%   BMI 26.23 kg/m²    Wt Readings from Last 3 Encounters:   12/09/20 185 lb 6.4 oz (84.1 kg)   12/09/20 180 lb 8 oz (81.9 kg)   12/09/20 180 lb 8 oz (81.9 kg)      24HR INTAKE/OUTPUT:      Intake/Output Summary (Last 24 hours) at 12/13/2020 1028  Last data filed at 12/13/2020 0740  Gross per 24 hour   Intake 2390 ml   Output 750 ml   Net 1640 ml       Constitutional:  Alert, awake, no apparent distress   Skin:normal with no rash or lesions but jaundiced looking. HEENT:Pupils are reactive . Throat is clear . Oral mucosa is moist.  Icteric sclera. Neck:supple with no thyromegaly or carotid bruit  Cardiovascular:  S1, S2 without murmur or rubs   Respiratory:  Clear to ausculation without wheezes, rhonchi or rales. Abdomen: +bs, soft, no tenderness to palpation and no palpable mass. No abdominal bruit   Ext: 3+ bilateral LE edema.   Erythema noted bilateral legs.  Musculoskeletal:Intact  Neuro:Alert and awake. Well oriented with no focal deficit. Speech is normal      Electronically signed by Vanessa Perez MD on 12/13/2020 at 10:28 AM  **This report has been created using voice recognition software. It maycontain minor  errors which are inherent in voice recognition technology. **

## 2020-12-13 NOTE — PROGRESS NOTES
PROGRESS NOTE      Patient:  Prince Lemonss      Unit/Bed:5K-15/015-A    YOB: 1955    MRN: [id number]       Acct: [de-identified]     PCP: Clara Garcia MD    Date of Admission: 2020      Assessment/Plan:    Active Hospital Problems    Diagnosis Date Noted    Jaundice, non- [R17] 2020    Severe malnutrition ([address] [E43] 2020     Class: Chronic     CODE STATUS DNR CC. Plans for discharge home with hospice, likely Monday to ensure pain control adequate. Conjugated hyperbilirubinemia, suspect intrahepatic cholestasis vs. Obstructive jaundice  -Noted when he presented to oncology clinic 2020; Total bilirubin 8.2  -CT abdomen pelvis \"4.6 cm irregular hypodense mass at the junction of the body and tail of the pancreas, numerous hepatic metastases and possible splenic metastasis, mesenteric and omental mets with moderate ascites\". Concern for obstructive jaundice. However, MRCP demonstrating patent ducts. -LFTs consistent with cholestatic pattern: Alk phos 520, , , lipase normal. Total protein 4.4.   -check  Hepatitis panel, EBV, CMV   -consult to GI,case discussed with Dr. Timbo Coleman, ERCP can be done but unlikely to improve prognosis. Patient and family decided to not pursue ERCP. CODE STATUS changed to DNR CC.  -Palliative and hospice consulted and following.       Chronic back pain, due to pancreatic cancer   -afebrile but having chills, no urinary symptoms. check UA, (+) CVA tenderness  -history of uncontrolled pain, was seen by pain management. Continue morphine regimen from home.   -Flexeril added q8 hrs for pain based on past pain management recommendations  -Urine culture gram negative bacilli, s/p zosyn, day 4.   Discontinue Zosyn.  -Continue home pain medications  -Dilaudid q4 prn for severe pain   -Hospice consulted and following.     Anasarca, due to hypoalbuminemia up to level of umbilicus   -due to hypoalbuminemia   -Increased daily bumex to 1 mg significant for prostate cancer, recent pancreatic cancer, oral mucositis, hyperlipidemia, arthritis and anxiety who presented from oncology office for evaluation of jaundice. He also has recent diagnosis of acute DVT and on therapeutic lovenox. Per office oncology note, \"Bilirubin 8.1 compared to 4.5 on 12/2/20. Alk phos 512, , . Unclear etiology if related intrahepatic ductal dilation versus obstruction from malignancy. Discussed worsening LFT with the patient and his friend today. Discussed with current  LFT, unable to give chemotherapy. Discussed recommendation for ED evaluation and hospital admission for further evaluation and treatment. Discussed if LFT does not improve he will not be a candidate for further chemotherapy. Called report to RITA Mora in the ED. Recommend MRCP to evaluate cause of hyperbilirubinemia and if stent able to be placed if obstruction present. \" He reports that he has had increasing fatigue, swelling of his bilateral legs and scrotum, and left flank plain that feels like, \"hit with a baseball bat,\" and is constant. Denies dysuria symptoms. Denies fevers. Reports chills.      On presentation, his vital signs were , /63, RR 16, 97% on room air. Labs significant for Na 132, BUN 28, Ca 8.0, ionized Ca 1.06, Total protein 4.8. Albumin 2.2, alkaline phosphatase 497, , , total bilirubin 8.2. Hgb 9.0, platelets 90. CT abdomen and pelvis demonstrated, 4.6 cm irregular hypodense mass at the junction of the body and tail of the pancreas, numerous hepatic mets and possible splenic mets, mesenteric and omental mets with moderate ascites, and anasarca. He was admitted to the hospitalists. Please see A&P for further details. Subjective:   Pt in bed. More confused today. Slowly worsening.      Medications:  Reviewed    Infusion Medications    bumetanide 0.1 mg/mL infusion 0.5 mg/hr (12/12/20 0961)     Scheduled Medications    calcium gluconate IVPB  2 g Intravenous Once    sodium chloride  2 g Oral BID WC    calcium replacement protocol   Other RX Placeholder    fluticasone  1 spray Each Nostril Daily    docusate sodium  100 mg Oral BID    enoxaparin  1.5 mg/kg Subcutaneous Daily    gabapentin  400 mg Oral TID    hydrocortisone  25 mg Rectal Q12H    lidocaine  1 patch Topical Daily    morphine  30 mg Oral TID    naloxegol  12.5 mg Oral QAM    pantoprazole  40 mg Oral QAM AC    tamsulosin  0.4 mg Oral Daily    sodium chloride flush  10 mL Intravenous 2 times per day    piperacillin-tazobactam  3.375 g Intravenous Q6H    polyethylene glycol  17 g Oral Daily     PRN Meds: cyclobenzaprine, HYDROmorphone, magic (miracle) mouthwash, morphine, sodium chloride flush, acetaminophen **OR** acetaminophen, polyethylene glycol, promethazine **OR** ondansetron, potassium chloride **OR** potassium alternative oral replacement **OR** potassium chloride      Intake/Output Summary (Last 24 hours) at 12/13/2020 1151  Last data filed at 12/13/2020 1126  Gross per 24 hour   Intake 2390 ml   Output 1175 ml   Net 1215 ml       Diet:  Dietary Nutrition Supplements: Standard High Calorie Oral Supplement  DIET GENERAL; Daily Fluid Restriction: 1500 ml  Dietary Nutrition Supplements: Other Oral Supplement (see comment)  Dietary Nutrition Supplements: Other Oral Supplement (see comment)    Exam:  BP (!) 102/54   Pulse 110   Temp 100.3 °F (37.9 °C) (Axillary)   Resp 9   Ht 5' 10.5\" (1.791 m)   Wt 185 lb 6.4 oz (84.1 kg)   SpO2 92%   BMI 26.23 kg/m²     General appearance:  Jaundiced, fatigued, frail with temporal wasting, appears older than stated age and cooperative. Laying with eyes closed, but opens them spontaneously. HEENT:  Temporal wasting, atraumatic without obvious deformity. Pupils pinpoit  Extra ocular muscles intact. Conjunctivae/corneas jaundiced. Neck: Supple, with full range of motion. No jugular venous distention. Trachea midline.   Respiratory: Increased wob  Cardiovascular:  Tachycardic,   Abdomen: Distended,nontender. Musculoskeletal:  Pitting edema bilaterally to thighs. Skin: Jaundice diffusely. No rashes or lesions. Neurologic:  Fine tremor. Cranial nerves: II-XII intact, grossly non-focal.  Psychiatric:  altered  Peripheral Pulses: present    Labs:   Recent Labs     12/11/20  0326   WBC 4.0*   HGB 8.9*   HCT 25.9*   *     Recent Labs     12/11/20  0326 12/12/20  0400 12/13/20  0501    136 132*   K 3.7 3.0* 3.6   CL 94* 91* 91*   CO2 29 34* 33   BUN 25* 20 23*   CREATININE 0.9 0.9 1.1   CALCIUM 8.1* 8.3* 7.9*     Recent Labs     12/11/20  0326 12/12/20  0400   * 77*   * 80*   BILITOT 8.9* 8.2*   ALKPHOS 608* 444*     Recent Labs     12/12/20  0737   INR 1.30*     No results for input(s): CKTOTAL, TROPONINI in the last 72 hours. Urinalysis:      Lab Results   Component Value Date    NITRU see below 12/09/2020    WBCUA 0-2 12/09/2020    BACTERIA NONE SEEN 12/09/2020    RBCUA 3-5 12/09/2020    BLOODU NEGATIVE 12/09/2020    SPECGRAV >1.030 12/09/2020       Radiology:  MRI ABDOMEN WO CONTRAST MRCP   Final Result   Addendum 1 of 1   ** ADDENDUM #1 **      The study was reviewed again at the request of the referring physician to    evaluate for a Klatskin tumor. 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 There are multiple lesions throughout the liver consistent with metastatic    disease. There is a large mass in the pancreas. There is ascites present. There is increased signal intensity in the subcutaneous soft tissues    consistent with edema.          Final      CT ABDOMEN PELVIS W IV CONTRAST Additional Contrast? None   Final Result       1. 4.6 cm irregular hypodense mass at the junction of the body and tail the pancreas as evidence for recurrent malignancy. 2. Numerous hepatic metastases and possible splenic metastasis. 3. Mesenteric and omental metastases with moderate ascites. 4. Anasarca. **This report has been created using voice recognition software. It may contain minor errors which are inherent in voice recognition technology. **      Final report electronically signed by Dr. Bhargav Reaves MD on 12/9/2020 3:00 PM      [address] [employer]    (Results Pending)       Diet: Dietary Nutrition Supplements: Standard High Calorie Oral Supplement  DIET GENERAL; Daily Fluid Restriction: 1500 ml  Dietary Nutrition Supplements: Other Oral Supplement (see comment)  Dietary Nutrition Supplements: Other Oral Supplement (see comment)    DVT prophylaxis: [x] Lovenox                                 [] SCDs                                 [] SQ Heparin                                 [] Encourage ambulation           [] Already on Anticoagulation     Disposition:    [x] Home- with hospice       [] TCU       [] Rehab       [] Psych       [] SNF       [] [facility]       [] Other-    Code Status: DNR-CC    PT/OT Eval Status: none      Electronically signed by Marques Menendez MD on 12/13/2020 at 11:51 AM

## 2020-12-13 NOTE — PROGRESS NOTES
Family called and notified of changes in patient's condition including respirations and mentation. Offered for family to come see patient if they wished.

## 2020-12-14 VITALS
RESPIRATION RATE: 18 BRPM | HEIGHT: 71 IN | HEART RATE: 101 BPM | BODY MASS INDEX: 26.04 KG/M2 | OXYGEN SATURATION: 92 % | SYSTOLIC BLOOD PRESSURE: 106 MMHG | DIASTOLIC BLOOD PRESSURE: 62 MMHG | WEIGHT: 186 LBS | TEMPERATURE: 97.6 F

## 2020-12-14 PROCEDURE — 99239 HOSP IP/OBS DSCHRG MGMT >30: CPT | Performed by: FAMILY MEDICINE

## 2020-12-14 PROCEDURE — 6370000000 HC RX 637 (ALT 250 FOR IP): Performed by: NURSE PRACTITIONER

## 2020-12-14 PROCEDURE — 6370000000 HC RX 637 (ALT 250 FOR IP): Performed by: STUDENT IN AN ORGANIZED HEALTH CARE EDUCATION/TRAINING PROGRAM

## 2020-12-14 PROCEDURE — 2580000003 HC RX 258: Performed by: FAMILY MEDICINE

## 2020-12-14 PROCEDURE — 6360000002 HC RX W HCPCS: Performed by: FAMILY MEDICINE

## 2020-12-14 PROCEDURE — 99232 SBSQ HOSP IP/OBS MODERATE 35: CPT | Performed by: INTERNAL MEDICINE

## 2020-12-14 RX ORDER — FLUTICASONE PROPIONATE 50 MCG
1 SPRAY, SUSPENSION (ML) NASAL DAILY
Qty: 1 BOTTLE | Refills: 3 | Status: SHIPPED | OUTPATIENT
Start: 2020-12-15

## 2020-12-14 RX ORDER — CYCLOBENZAPRINE HCL 5 MG
5 TABLET ORAL 3 TIMES DAILY PRN
Qty: 30 TABLET | Refills: 0 | Status: SHIPPED | OUTPATIENT
Start: 2020-12-14 | End: 2020-12-24

## 2020-12-14 RX ORDER — SODIUM CHLORIDE 1000 MG
2 TABLET, SOLUBLE MISCELLANEOUS 2 TIMES DAILY WITH MEALS
Qty: 90 TABLET | Refills: 3 | Status: SHIPPED | OUTPATIENT
Start: 2020-12-14

## 2020-12-14 RX ADMIN — PANTOPRAZOLE SODIUM 40 MG: 40 TABLET, DELAYED RELEASE ORAL at 06:51

## 2020-12-14 RX ADMIN — MORPHINE SULFATE 30 MG: 30 TABLET, FILM COATED, EXTENDED RELEASE ORAL at 13:39

## 2020-12-14 RX ADMIN — MORPHINE SULFATE 15 MG: 15 TABLET ORAL at 13:39

## 2020-12-14 RX ADMIN — PIPERACILLIN AND TAZOBACTAM 3.38 G: 3; .375 INJECTION, POWDER, LYOPHILIZED, FOR SOLUTION INTRAVENOUS at 03:20

## 2020-12-14 RX ADMIN — CYCLOBENZAPRINE 5 MG: 10 TABLET, FILM COATED ORAL at 09:24

## 2020-12-14 RX ADMIN — GABAPENTIN 400 MG: 400 CAPSULE ORAL at 13:40

## 2020-12-14 RX ADMIN — DOCUSATE SODIUM 100 MG: 100 CAPSULE, LIQUID FILLED ORAL at 09:19

## 2020-12-14 RX ADMIN — MORPHINE SULFATE 30 MG: 30 TABLET, FILM COATED, EXTENDED RELEASE ORAL at 09:19

## 2020-12-14 RX ADMIN — NALOXEGOL OXALATE 12.5 MG: 12.5 TABLET, FILM COATED ORAL at 09:19

## 2020-12-14 RX ADMIN — GABAPENTIN 400 MG: 400 CAPSULE ORAL at 09:19

## 2020-12-14 RX ADMIN — SODIUM CHLORIDE TAB 1 GM 2 G: 1 TAB at 09:19

## 2020-12-14 ASSESSMENT — PAIN SCALES - GENERAL
PAINLEVEL_OUTOF10: 7

## 2020-12-14 ASSESSMENT — PAIN DESCRIPTION - LOCATION: LOCATION: ABDOMEN

## 2020-12-14 ASSESSMENT — PAIN DESCRIPTION - PAIN TYPE: TYPE: ACUTE PAIN

## 2020-12-14 ASSESSMENT — PAIN DESCRIPTION - DESCRIPTORS: DESCRIPTORS: ACHING

## 2020-12-14 ASSESSMENT — PAIN DESCRIPTION - DIRECTION: RADIATING_TOWARDS: RIGHT

## 2020-12-14 ASSESSMENT — PAIN DESCRIPTION - ORIENTATION: ORIENTATION: MID

## 2020-12-14 ASSESSMENT — PAIN DESCRIPTION - PROGRESSION: CLINICAL_PROGRESSION: NOT CHANGED

## 2020-12-14 ASSESSMENT — PAIN DESCRIPTION - ONSET: ONSET: ON-GOING

## 2020-12-14 ASSESSMENT — PAIN - FUNCTIONAL ASSESSMENT: PAIN_FUNCTIONAL_ASSESSMENT: PREVENTS OR INTERFERES SOME ACTIVE ACTIVITIES AND ADLS

## 2020-12-14 ASSESSMENT — PAIN DESCRIPTION - FREQUENCY: FREQUENCY: CONTINUOUS

## 2020-12-14 NOTE — PROGRESS NOTES
edema bilaterally, anasarca, improving         Last 3 CBC   No results for input(s): WBC, RBC, HGB, HCT, PLT in the last 72 hours. Last 3 CMP  Recent Labs     12/12/20  0400 12/13/20  0501    132*   K 3.0* 3.6   CL 91* 91*   CO2 34* 33   BUN 20 23*   CREATININE 0.9 1.1   CALCIUM 8.3* 7.9*   LABALBU 3.2*  --    BILITOT 8.2*  --              ASSESSMENT / Plan     sessment    1. Renal -renal function appears to be stable at baseline slightly elevated BUN/creatinine ratio. -Creatinine slightly has rising to 1.1 however still within reasonable limits follow for now  2. Anasarca/fluid overload-clinically very significant. Almost up to the abdomen  ? Exact etiology not clear,  Hypoalbuminemia may be contributing to some degree as well  ? Decent urine output on a Bumex drip. He is -4 L. ? Stop the Bumex drip     3. Electrolytes -hyponatremia secondary to hypervolemic hyponatremia. 4. Blood pressure maintaining stable  5. Jaundice most likely secondary to obstruction from pancreatic malignancy needs an MRI and GI has been consulted. Elevated liver enzymes noted as well  6. Metastatic pancreatic cancer-will be seen by oncology palliative on board  7. Meds reviewed and discussed with patient   8. Family at bedside mostly he will be going on hospice . We will sign off please call with any questions or concerns or if situation changes      SHANA Stevenson D.  [facility].

## 2020-12-14 NOTE — PROGRESS NOTES
Updated by RITA HERNANDEZ and reviewed past 24 hr chart including medications and notes. Met bedside with patient, his son Abby Bui and daughter in law [place]. Pt states satisfied with back pain level 3-4/10 with current MS Contin 30 mg tid and ready to go home. Patient states no difficulty swallowing medications at this time and feels able to go by private car. Pt reports some anxiety and educated on Lorazepam 0.5 mg q4 prn request and will deliver on admission. Reviewed DME needs and family updated wife Ronal Tristan will be contacted for delivery time. DME faxed request for hospital bed, overbed table, bedside commode, and wheelchair. Pt states he feels up to going home by private car. Call to wife Ronal Tristan to review discharge plan, DME request and current home pain medication supply. Primary Shyla SALINAS updated and agrees patient is safe to be discharge by private car. Dr. Delene Boxer updated with discharge plan and homegoing prescriptions and DNR signed.

## 2020-12-14 NOTE — PROGRESS NOTES
Dressed in own clothing, all belongings packed. Discharge instructions given, all questions answered. Chart placed in yellow discharge bin. AVS faxed to hospice. Transport requested.

## 2020-12-14 NOTE — DISCHARGE SUMMARY
on 12/2/20. Alk phos 512, , . Unclear etiology if related intrahepatic ductal dilation versus obstruction from malignancy. Discussed worsening LFT with the patient and his friend today. Discussed with current  LFT, unable to give chemotherapy. Discussed recommendation for ED evaluation and hospital admission for further evaluation and treatment. Discussed if LFT does not improve he will not be a candidate for further chemotherapy. Called report to RITA Mora in the ED. Recommend MRCP to evaluate cause of hyperbilirubinemia and if stent able to be placed if obstruction present. \" He reports that he has had increasing fatigue, swelling of his bilateral legs and scrotum, and left flank plain that feels like, \"hit with a baseball bat,\" and is constant. Denies dysuria symptoms. Denies fevers. Reports chills.      On presentation, his vital signs were , /63, RR 16, 97% on room air. Labs significant for Na 132, BUN 28, Ca 8.0, ionized Ca 1.06, Total protein 4.8. Albumin 2.2, alkaline phosphatase 497, , , total bilirubin 8.2. Hgb 9.0, platelets 90. CT abdomen and pelvis demonstrated, 4.6 cm irregular hypodense mass at the junction of the body and tail of the pancreas, numerous hepatic mets and possible splenic mets, mesenteric and omental mets with moderate ascites, and anasarca. He was admitted to the hospitalists. Patient was seen and evaluated with Dr. Karolina Berrios, GI. They were given the option to pursue ERCP, but declined due to the low likelihood of it improving prognosis. Palliative and hospice were also consulted and followed the patient. Patient's pain was controlled with flexeril, dilaudid, morphine scheduled and prn. Patient's Echo showed no evidence of reduced EF. Patient does have peripheral edema and his bumex was increased to 1 mg BID. Patient underwent paracentesis for comfort measures due to dyspnea on rest as well as exertion.  Patient was given calcium/magnesium normal.         Behavior: Behavior normal.       Labs: For convenience and continuity at follow-up the following most recent labs are provided:    CBC:    Lab Results   Component Value Date    WBC 4.0 12/11/2020    HGB 8.9 12/11/2020    HCT 25.9 12/11/2020     12/11/2020       Renal:    Lab Results   Component Value Date     12/13/2020    K 3.6 12/13/2020    K 4.0 12/10/2020    CL 91 12/13/2020    CO2 33 12/13/2020    BUN 23 12/13/2020    CREATININE 1.1 12/13/2020    CALCIUM 7.9 12/13/2020    PHOS 3.7 11/19/2020         Significant Diagnostic Studies    Radiology:   US GUIDED PARACENTESIS   Final Result   Successful ultrasound-guided paracentesis. **This report has been created using voice recognition software. It may contain minor errors which are inherent in voice recognition technology. **      Final report electronically signed by Dr. Alberto Martinez on 12/12/2020 1:44 PM      XR ABDOMEN (KUB) (SINGLE AP VIEW)   Final Result   1. Nonobstructive bowel gas pattern. No significantly dilated loops of bowel are seen on the current examination. **This report has been created using voice recognition software. It may contain minor errors which are inherent in voice recognition technology. **      Final report electronically signed by Dr. Alberto Martinez on 12/12/2020 1:31 PM      MRI ABDOMEN WO CONTRAST MRCP   Final Result   Addendum 1 of 1   ADDENDUM #1       The study was reviewed again at the request of the referring physician to    evaluate for a Klatskin tumor. 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 There are multiple lesions throughout the liver consistent with metastatic    disease. There is a large mass in the pancreas. There is ascites present.     There is increased signal intensity in the subcutaneous soft tissues    consistent with edema. Final      CT ABDOMEN PELVIS W IV CONTRAST Additional Contrast? None   Final Result       1. 4.6 cm irregular hypodense mass at the junction of the body and tail the pancreas as evidence for recurrent malignancy. 2. Numerous hepatic metastases and possible splenic metastasis. 3. Mesenteric and omental metastases with moderate ascites. 4. Anasarca. **This report has been created using voice recognition software. It may contain minor errors which are inherent in voice recognition technology. **      Final report electronically signed by Dr. Reddy Cook MD on 12/9/2020 3:00 PM             Consults:     IP CONSULT TO GI  IP CONSULT TO NEPHROLOGY  PALLIATIVE CARE EVAL  IP CONSULT TO ONCOLOGY  IP CONSULT TO HOSPICE    Disposition:    [x] Home       [] TCU       [] Rehab       [] Psych       [] SNF       [] [facility]       [] Other-    Condition at Discharge: Stable    Code Status:  DNR-CC     Patient Instructions:    Discharge lab work: none  Activity: activity as tolerated    Diet: Dietary Nutrition Supplements: Standard High Calorie Oral Supplement  DIET GENERAL; Daily Fluid Restriction: 1500 ml  Dietary Nutrition Supplements: Other Oral Supplement (see comment)  Dietary Nutrition Supplements: Other Oral Supplement (see comment)      Follow-up visits:   [facility]  [address] [place] [address]  [phone]             Discharge Medications:      Desiree Carlton, [facility] Medication Instructions LBP:[id number]    Printed on:12/14/20 0340   Medication Information                      bumetanide (BUMEX) 1 MG tablet  Take 1 tablet by mouth daily             docusate sodium (COLACE) 100 MG capsule  Take 100 mg by mouth 2 times daily             enoxaparin (LOVENOX) 120 MG/0.8ML injection  Inject 0.53 mLs into the skin daily             gabapentin (NEURONTIN) 400 MG capsule  Take 400 mg by mouth 3 times daily. lidocaine (LIDODERM) 5 %  Place 2 patches onto the skin daily 12 hours on, 12 hours off. Magic Mouthwash (MIRACLE MOUTHWASH)  Swish and spit 5 mLs 4 times daily as needed for Irritation 1:1:1:1 nystatin, viscous lidocaine, benadryl, maalox             morphine (MS CONTIN) 30 MG extended release tablet  Take 1 tablet by mouth 3 times daily for 30 days. morphine (MSIR) 15 MG tablet  Take 1 tablet by mouth every 4 hours as needed for Pain for up to 30 days. naloxegol (MOVANTIK) 12.5 MG TABS tablet  Take 1 tablet by mouth every morning             omeprazole (PRILOSEC) 20 MG delayed release capsule  Take 1 capsule by mouth daily             polyethylene glycol (GLYCOLAX) 17 GM/SCOOP powder  Take 17 g by mouth daily             sodium chloride 1 g tablet  Take 2 tablets by mouth 3 times daily (with meals)             tamsulosin (FLOMAX) 0.4 MG capsule  Take 1 capsule by mouth daily               Time Spent on discharge is more than 1 hour in the examination, evaluation, counseling and review of medications and discharge plan. Signed: Thank you Joe Avery MD for the opportunity to be involved in this patient's care.     Electronically signed by Rao Rizzo DO on 12/14/2020 at 11:49 AM

## 2020-12-14 NOTE — CARE COORDINATION
Patient to be discharged to home with Hospice to sign on. Electronically signed by Nathanael Subramanian RN on 12/14/20 at 12:05 PM EST    12/14/20, 12:05 PM EST    Patient goals/plan/ treatment preferences discussed by  and . Patient goals/plan/ treatment preferences reviewed with patient/ family. Patient/ family verbalize understanding of discharge plan and are in agreement with goal/plan/treatment preferences. Understanding was demonstrated using the teach back method. AVS provided by RN at time of discharge, which includes all necessary medical information pertaining to the patients current course of illness, treatment, post-discharge goals of care, and treatment preferences. IMM Letter  IMM Letter given to Patient/Family/Significant other/Guardian/POA/by[de-identified] Intake.   IMM Letter date given[de-identified] 12/09/20  IMM Letter time given[de-identified] 6827

## 2020-12-15 NOTE — PROGRESS NOTES
CLINICAL PHARMACY NOTE: MEDS TO [address] Select Patient?: No  Total # of Prescriptions Filled: 3   The following medications were delivered to the patient:  Cyclobenzaprine 5 mg  Sodium Chloride 1 gm  Fluticasone nasal spray   Total # of Interventions Completed: 2  Time Spent (min): 30    Additional Documentation:

## 2020-12-16 ENCOUNTER — TELEPHONE (OUTPATIENT)
Dept: ONCOLOGY | Age: 65
End: 2020-12-16

## 2020-12-16 ENCOUNTER — HOSPITAL ENCOUNTER (OUTPATIENT)
Dept: INFUSION THERAPY | Age: 65
End: 2020-12-16
Payer: COMMERCIAL

## 2020-12-16 NOTE — TELEPHONE ENCOUNTER
Patient called asking about his FMLA forms. His work called stating that they need a time frame for him to be off work, is it 6 months, 1 year, or indefinitely? He is home on hospice now. We need to fill out some type of paperwork stating this to his work. Please advise, thank you.

## 2020-12-22 RX ORDER — FENTANYL CITRATE 50 UG/ML
50 INJECTION, SOLUTION INTRAMUSCULAR; INTRAVENOUS ONCE
Status: CANCELLED | OUTPATIENT
Start: 2020-12-22 | End: 2020-12-22

## 2020-12-22 RX ORDER — CLINDAMYCIN PHOSPHATE 600 MG/50ML
600 INJECTION INTRAVENOUS
Status: CANCELLED | OUTPATIENT
Start: 2020-12-22

## 2020-12-22 RX ORDER — SODIUM CHLORIDE 450 MG/100ML
INJECTION, SOLUTION INTRAVENOUS CONTINUOUS
Status: CANCELLED | OUTPATIENT
Start: 2020-12-22

## 2020-12-22 RX ORDER — MIDAZOLAM HYDROCHLORIDE 2 MG/2ML
1 INJECTION, SOLUTION INTRAMUSCULAR; INTRAVENOUS ONCE
Status: CANCELLED | OUTPATIENT
Start: 2020-12-22 | End: 2020-12-22

## 2020-12-23 ENCOUNTER — HOSPITAL ENCOUNTER (OUTPATIENT)
Dept: INTERVENTIONAL RADIOLOGY/VASCULAR | Age: 65
Discharge: HOME | End: 2020-12-23
Payer: COMMERCIAL

## 2020-12-23 VITALS
TEMPERATURE: 98.6 F | OXYGEN SATURATION: 93 % | SYSTOLIC BLOOD PRESSURE: 102 MMHG | RESPIRATION RATE: 16 BRPM | DIASTOLIC BLOOD PRESSURE: 57 MMHG | HEART RATE: 95 BPM

## 2020-12-23 LAB
ERYTHROCYTE [DISTWIDTH] IN BLOOD BY AUTOMATED COUNT: 23.5 % (ref 11.5–14.5)
ERYTHROCYTE [DISTWIDTH] IN BLOOD BY AUTOMATED COUNT: 81.7 FL (ref 35–45)
HCT VFR BLD CALC: 32.1 % (ref 42–52)
HEMOGLOBIN: 10.9 GM/DL (ref 14–18)
MCH RBC QN AUTO: 33.2 PG (ref 26–33)
MCHC RBC AUTO-ENTMCNC: 34 GM/DL (ref 32.2–35.5)
MCV RBC AUTO: 97.9 FL (ref 80–94)
PLATELET # BLD: 241 THOU/MM3 (ref 130–400)
PMV BLD AUTO: 10.9 FL (ref 9.4–12.4)
RBC # BLD: 3.28 MILL/MM3 (ref 4.7–6.1)
WBC # BLD: 19.2 THOU/MM3 (ref 4.8–10.8)

## 2020-12-23 PROCEDURE — 2709999900 HC NON-CHARGEABLE SUPPLY

## 2020-12-23 PROCEDURE — 2580000003 HC RX 258: Performed by: RADIOLOGY

## 2020-12-23 PROCEDURE — 36415 COLL VENOUS BLD VENIPUNCTURE: CPT

## 2020-12-23 PROCEDURE — 49418 INSERT TUN IP CATH PERC: CPT

## 2020-12-23 PROCEDURE — 6360000002 HC RX W HCPCS

## 2020-12-23 PROCEDURE — C1729 CATH, DRAINAGE: HCPCS

## 2020-12-23 PROCEDURE — 2500000003 HC RX 250 WO HCPCS: Performed by: RADIOLOGY

## 2020-12-23 PROCEDURE — C1894 INTRO/SHEATH, NON-LASER: HCPCS

## 2020-12-23 PROCEDURE — 2500000003 HC RX 250 WO HCPCS

## 2020-12-23 PROCEDURE — 6370000000 HC RX 637 (ALT 250 FOR IP)

## 2020-12-23 PROCEDURE — 85027 COMPLETE CBC AUTOMATED: CPT

## 2020-12-23 RX ORDER — CLINDAMYCIN PHOSPHATE 600 MG/50ML
600 INJECTION INTRAVENOUS
Status: COMPLETED | OUTPATIENT
Start: 2020-12-23 | End: 2020-12-23

## 2020-12-23 RX ORDER — FENTANYL CITRATE 50 UG/ML
50 INJECTION, SOLUTION INTRAMUSCULAR; INTRAVENOUS ONCE
Status: COMPLETED | OUTPATIENT
Start: 2020-12-23 | End: 2020-12-23

## 2020-12-23 RX ORDER — SODIUM CHLORIDE 450 MG/100ML
INJECTION, SOLUTION INTRAVENOUS CONTINUOUS
Status: DISCONTINUED | OUTPATIENT
Start: 2020-12-23 | End: 2020-12-24 | Stop reason: HOSPADM

## 2020-12-23 RX ORDER — MIDAZOLAM HYDROCHLORIDE 2 MG/2ML
1 INJECTION, SOLUTION INTRAMUSCULAR; INTRAVENOUS ONCE
Status: COMPLETED | OUTPATIENT
Start: 2020-12-23 | End: 2020-12-23

## 2020-12-23 RX ADMIN — CLINDAMYCIN IN 5 PERCENT DEXTROSE 600 MG: 12 INJECTION, SOLUTION INTRAVENOUS at 08:43

## 2020-12-23 RX ADMIN — MIDAZOLAM HYDROCHLORIDE 0.5 MG: 2 INJECTION, SOLUTION INTRAMUSCULAR; INTRAVENOUS at 09:42

## 2020-12-23 RX ADMIN — FENTANYL CITRATE 25 MCG: 50 INJECTION, SOLUTION INTRAMUSCULAR; INTRAVENOUS at 09:42

## 2020-12-23 RX ADMIN — SODIUM CHLORIDE: 4.5 INJECTION, SOLUTION INTRAVENOUS at 08:39

## 2020-12-23 ASSESSMENT — PAIN SCALES - GENERAL
PAINLEVEL_OUTOF10: 7
PAINLEVEL_OUTOF10: 0

## 2020-12-23 ASSESSMENT — PAIN - FUNCTIONAL ASSESSMENT: PAIN_FUNCTIONAL_ASSESSMENT: 0-10

## 2020-12-23 NOTE — PROGRESS NOTES
0536 Pt in specials radiology for abdominal pleurx catheter insertion. Explained procedure to pt and pt verbalizes understanding. 0228 Attached to monitor. 0668 Dr Diane Stevens to speak to pt.  Melanie Honey to table and abdomen prepped and draped. 2663 16 fr abdominal pleurx catheter inserted in right lateral abdomen per Dr Diane Stevens. Pt tolerated well.  1002 Total 1000 ml's dark sydnee fluid withdrawn. Dermabond applied to insertion site with 4 x 4. Bio-patch placed at catheter site with foam and 4 x 4 dressing with large op-site. Sites without redness, swelling or hematoma. 1010 Pt positioned on cart for comfort. 1012 Transferred to [facility] cart. [facility]

## 2020-12-23 NOTE — H&P
Formulation and discussion of sedation / procedure plans, risks, benefits, side effects and alternatives with patient and/or responsible adult completed.     Electronically signed by Chase Gustafson MD on 12/23/2020 at 10:32 AM

## 2020-12-23 NOTE — H&P
MG tablet, Take 1 tablet by mouth daily, Disp: 60 tablet, Rfl: 0    lidocaine (LIDODERM) 5 %, Place 2 patches onto the skin daily 12 hours on, 12 hours off., Disp: 60 patch, Rfl: 0    naloxegol (MOVANTIK) 12.5 MG TABS tablet, Take 1 tablet by mouth every morning, Disp: 10 tablet, Rfl: 0    omeprazole (PRILOSEC) 20 MG delayed release capsule, Take 1 capsule by mouth daily, Disp: 30 capsule, Rfl: 3    gabapentin (NEURONTIN) 400 MG capsule, Take 400 mg by mouth 3 times daily. , Disp: , Rfl:     polyethylene glycol (GLYCOLAX) 17 GM/SCOOP powder, Take 17 g by mouth daily, Disp: , Rfl:     docusate sodium (COLACE) 100 MG capsule, Take 100 mg by mouth 2 times daily, Disp: , Rfl:     tamsulosin (FLOMAX) 0.4 MG capsule, Take 1 capsule by mouth daily, Disp: 90 capsule, Rfl: 1    Current Facility-Administered Medications:     0.45 % sodium chloride infusion, , Intravenous, Continuous, Blue Amato MD, Last Rate: 20 mL/hr at 12/23/20 0839, New Bag at 12/23/20 0839  Prior to Admission medications    Medication Sig Start Date End Date Taking?  Authorizing Provider   cyclobenzaprine (FLEXERIL) 5 MG tablet Take 1 tablet by mouth 3 times daily as needed for Muscle spasms 12/14/20 12/24/20  Charlie Amelia, DO   sodium chloride 1 g tablet Take 2 tablets by mouth 2 times daily (with meals) 12/14/20   Charlie Amelia, DO   fluticasone Goldsmithtamra Cook) 50 MCG/ACT nasal spray 1 spray by Each Nostril route daily 12/15/20   Charlie Amelia, DO   enoxaparin (LOVENOX) 120 MG/0.8ML injection Inject 0.8 mLs into the skin daily 12/15/20   Charlie Amelia, DO   Magic Mouthwash (MIRACLE MOUTHWASH) Swish and spit 5 mLs 4 times daily as needed for Irritation 1:1:1:1 nystatin, viscous lidocaine, benadryl, maalox 12/7/20   Nani Frausto PA-C   bumetanide (BUMEX) 1 MG tablet Take 1 tablet by mouth daily 12/4/20   Norbert Ordoñez MD   lidocaine (LIDODERM) 5 % Place 2 patches onto the skin daily 12 hours on, 12 hours off. 12/3/20 1/2/21 Lalo Zuleta,    naloxegol (MOVANTIK) 12.5 MG TABS tablet Take 1 tablet by mouth every morning 11/24/20   David Espinal MD   omeprazole (PRILOSEC) 20 MG delayed release capsule Take 1 capsule by mouth daily 11/18/20   Arabella Salas MD   gabapentin (NEURONTIN) 400 MG capsule Take 400 mg by mouth 3 times daily. Historical Provider, MD   polyethylene glycol (GLYCOLAX) 17 GM/SCOOP powder Take 17 g by mouth daily    Historical Provider, MD   docusate sodium (COLACE) 100 MG capsule Take 100 mg by mouth 2 times daily    Historical Provider, MD   tamsulosin (FLOMAX) 0.4 MG capsule Take 1 capsule by mouth daily 11/10/20   Arabella Salas MD     Additional information:       VITAL SIGNS   Vitals:    12/23/20 1025   BP: (!) 103/59   Pulse: 98   Resp: 16   Temp:    SpO2: 96%       PHYSICAL:   Heart:  [x]Regular rate and rhythm  []Other:    Lungs:  [x]Clear    []Other:    Abdomen: []Soft    [x]Other: Distended. Mental Status: [x]Alert & Oriented  []Other:      PLANNED PROCEDURE   []Biospy []Arteriogram              [x]Drainage   []Mediport Insertion  []Fistulogram []IV access       []Vertebroplasty / Augmentation  []IVC filter []Dialysis catheter []Biliary drainage  []Other: []CAPD Catheter []Nephrostomy Tube / Stent  SEDATION  Planned agent:[x]Midazolam []Meperidine [x]Sublimaze []Dilaudid []Morphine     []Diazepam  []Other:     ASA Classification:  []1 [x]2 []3 []4 []5  Class 1: A normal healthy patient  Class 2: Pt with mild to moderate systemic disease  Class 3: Severe systemic disease or disturbance  Class 4: Severe systemic disorders that are already life threatening. Class 5: Moribund pt with little chances of survival, for more than 24 hours. Mallampati I Airway Classification:   []1 [x]2 []3 []4    [x]Pre-procedure diagnostic studies complete and results available. Comment:    [x]Previous sedation/anesthesia experiences assessed.    Comment:    [x]The patient is an appropriate candidate to undergo the planned procedure sedation and anesthesia. (Refer to nursing sedation/analgesia documentation record)  [x]Formulation and discussion of sedation/procedure plan, risks, and expectations with patient and/or responsible adult completed. [x]Patient examined immediately prior to the procedure.  (Refer to nursing sedation/analgesia documentation record)    Jean Pierre Thao MD  Electronically signed 12/23/2020 at 10:33 AM

## 2020-12-23 NOTE — OP NOTE
Department of Radiology  Post Procedure Progress Note      Pre-Procedure Diagnosis:  Ascites    Procedure Performed:  Abdominal pleurx placement. Anesthesia: local / versed and fentanyl    Findings: successful    Immediate Complications:  None    Estimated Blood Loss: minimal    SEE DICTATED PROCEDURE NOTE FOR COMPLETE DETAILS.     Electronically signed by Lavella Goodell, MD on 12/23/2020 at 10:36 AM

## 2020-12-23 NOTE — PROGRESS NOTES
Patient being discharged in stable condition with LACP. Written and verbal instructions given to patient he voices no question or concerns.

## 2020-12-30 NOTE — PROGRESS NOTES
[id number] Attempted to call pt for follow-up abdominal pleurx catheter insertion. No answer. Follow-up letter sent.

## 2023-10-18 NOTE — TELEPHONE ENCOUNTER
I called to speak to [facility] and had to leave him a message. I left him a message that Dr. Chris Graves was calling him. Message left on voicemail for patient to return call to clinic to schedule a colonoscopy

## 2024-02-03 NOTE — PROGRESS NOTES
__M__ Safety:       (Environmental)   [place] to environment   Ensure ID band is correct and in place/ allergy band as needed   Assess for fall risk   Initiate fall precautions as applicable (fall band, side rails, etc.)   Call light within reach   Bed in low position/ wheels locked    __M__ Pain:        Assess pain level and characteristics   Administer analgesics as ordered   Assess effectiveness of pain management and report to MD as needed    _M___ Knowledge Deficit:   Assess baseline knowledge   Provide teaching at level of understanding   Provide teaching via preferred learning method   Evaluate teaching effectiveness    _M___ Hemodynamic/Respiratory Status:       (Pre and Post Procedure Monitoring)   Assess/Monitor vital signs and LOC   Assess Baseline SpO2 prior to any sedation   Obtain weight/height   Assess vital signs/ LOC until patient meets discharge criteria   Monitor procedure site and notify MD of any issues 46

## (undated) DEVICE — GLOVE ORTHO 8   MSG9480